# Patient Record
Sex: FEMALE | Race: WHITE | NOT HISPANIC OR LATINO | URBAN - METROPOLITAN AREA
[De-identification: names, ages, dates, MRNs, and addresses within clinical notes are randomized per-mention and may not be internally consistent; named-entity substitution may affect disease eponyms.]

---

## 2017-05-15 ENCOUNTER — OUTPATIENT (OUTPATIENT)
Dept: OUTPATIENT SERVICES | Facility: HOSPITAL | Age: 77
LOS: 1 days | Discharge: HOME | End: 2017-05-15

## 2017-06-28 DIAGNOSIS — C50.411 MALIGNANT NEOPLASM OF UPPER-OUTER QUADRANT OF RIGHT FEMALE BREAST: ICD-10-CM

## 2017-06-28 DIAGNOSIS — E83.52 HYPERCALCEMIA: ICD-10-CM

## 2017-08-08 ENCOUNTER — OUTPATIENT (OUTPATIENT)
Dept: OUTPATIENT SERVICES | Facility: HOSPITAL | Age: 77
LOS: 1 days | Discharge: HOME | End: 2017-08-08

## 2017-08-08 DIAGNOSIS — C50.411 MALIGNANT NEOPLASM OF UPPER-OUTER QUADRANT OF RIGHT FEMALE BREAST: ICD-10-CM

## 2017-11-25 ENCOUNTER — OUTPATIENT (OUTPATIENT)
Dept: OUTPATIENT SERVICES | Facility: HOSPITAL | Age: 77
LOS: 1 days | Discharge: HOME | End: 2017-11-25

## 2017-11-25 DIAGNOSIS — E83.52 HYPERCALCEMIA: ICD-10-CM

## 2017-11-25 DIAGNOSIS — C50.411 MALIGNANT NEOPLASM OF UPPER-OUTER QUADRANT OF RIGHT FEMALE BREAST: ICD-10-CM

## 2017-11-25 PROBLEM — Z00.00 ENCOUNTER FOR PREVENTIVE HEALTH EXAMINATION: Status: ACTIVE | Noted: 2017-11-25

## 2018-01-08 ENCOUNTER — APPOINTMENT (OUTPATIENT)
Dept: SURGERY | Facility: CLINIC | Age: 78
End: 2018-01-08
Payer: MEDICARE

## 2018-01-08 VITALS
BODY MASS INDEX: 29.32 KG/M2 | DIASTOLIC BLOOD PRESSURE: 72 MMHG | HEIGHT: 65 IN | SYSTOLIC BLOOD PRESSURE: 124 MMHG | WEIGHT: 176 LBS

## 2018-01-08 PROCEDURE — 99212 OFFICE O/P EST SF 10 MIN: CPT

## 2018-02-17 ENCOUNTER — OUTPATIENT (OUTPATIENT)
Dept: OUTPATIENT SERVICES | Facility: HOSPITAL | Age: 78
LOS: 1 days | Discharge: HOME | End: 2018-02-17

## 2018-02-17 DIAGNOSIS — C50.411 MALIGNANT NEOPLASM OF UPPER-OUTER QUADRANT OF RIGHT FEMALE BREAST: ICD-10-CM

## 2018-05-07 ENCOUNTER — APPOINTMENT (OUTPATIENT)
Dept: SURGERY | Facility: CLINIC | Age: 78
End: 2018-05-07
Payer: MEDICARE

## 2018-05-07 VITALS
SYSTOLIC BLOOD PRESSURE: 146 MMHG | HEIGHT: 65 IN | DIASTOLIC BLOOD PRESSURE: 88 MMHG | BODY MASS INDEX: 29.16 KG/M2 | WEIGHT: 175 LBS

## 2018-05-07 PROCEDURE — 99214 OFFICE O/P EST MOD 30 MIN: CPT

## 2018-05-07 RX ORDER — ANASTROZOLE TABLETS 1 MG/1
1 TABLET ORAL
Qty: 90 | Refills: 0 | Status: ACTIVE | COMMUNITY
Start: 2018-03-01

## 2018-05-07 RX ORDER — AMLODIPINE BESYLATE 5 MG/1
5 TABLET ORAL
Qty: 90 | Refills: 0 | Status: ACTIVE | COMMUNITY
Start: 2017-11-17

## 2018-06-09 ENCOUNTER — OUTPATIENT (OUTPATIENT)
Dept: OUTPATIENT SERVICES | Facility: HOSPITAL | Age: 78
LOS: 1 days | Discharge: HOME | End: 2018-06-09

## 2018-06-09 DIAGNOSIS — C50.411 MALIGNANT NEOPLASM OF UPPER-OUTER QUADRANT OF RIGHT FEMALE BREAST: ICD-10-CM

## 2018-09-08 ENCOUNTER — OUTPATIENT (OUTPATIENT)
Dept: OUTPATIENT SERVICES | Facility: HOSPITAL | Age: 78
LOS: 1 days | Discharge: HOME | End: 2018-09-08

## 2018-09-08 DIAGNOSIS — C50.411 MALIGNANT NEOPLASM OF UPPER-OUTER QUADRANT OF RIGHT FEMALE BREAST: ICD-10-CM

## 2018-10-04 ENCOUNTER — APPOINTMENT (OUTPATIENT)
Dept: SURGERY | Facility: CLINIC | Age: 78
End: 2018-10-04
Payer: MEDICARE

## 2018-10-04 VITALS
HEIGHT: 65 IN | BODY MASS INDEX: 29.66 KG/M2 | WEIGHT: 178 LBS | DIASTOLIC BLOOD PRESSURE: 80 MMHG | SYSTOLIC BLOOD PRESSURE: 146 MMHG

## 2018-10-04 DIAGNOSIS — K43.2 INCISIONAL HERNIA W/OUT OBSTRUCTION OR GANGRENE: ICD-10-CM

## 2018-10-04 DIAGNOSIS — C50.911 MALIGNANT NEOPLASM OF UNSPECIFIED SITE OF RIGHT FEMALE BREAST: ICD-10-CM

## 2018-10-04 PROCEDURE — 99212 OFFICE O/P EST SF 10 MIN: CPT

## 2018-12-22 ENCOUNTER — INPATIENT (INPATIENT)
Facility: HOSPITAL | Age: 78
LOS: 4 days | Discharge: NURSING, REHAB OR LONG-TERM CARE | End: 2018-12-27
Attending: HOSPITALIST | Admitting: HOSPITALIST

## 2018-12-22 VITALS
HEART RATE: 99 BPM | TEMPERATURE: 97 F | RESPIRATION RATE: 18 BRPM | OXYGEN SATURATION: 98 % | DIASTOLIC BLOOD PRESSURE: 93 MMHG | SYSTOLIC BLOOD PRESSURE: 184 MMHG

## 2018-12-22 DIAGNOSIS — Z96.659 PRESENCE OF UNSPECIFIED ARTIFICIAL KNEE JOINT: Chronic | ICD-10-CM

## 2018-12-22 DIAGNOSIS — Z96.653 PRESENCE OF ARTIFICIAL KNEE JOINT, BILATERAL: Chronic | ICD-10-CM

## 2018-12-22 DIAGNOSIS — Z90.11 ACQUIRED ABSENCE OF RIGHT BREAST AND NIPPLE: Chronic | ICD-10-CM

## 2018-12-22 LAB
ALBUMIN SERPL ELPH-MCNC: 4 G/DL — SIGNIFICANT CHANGE UP (ref 3.5–5.2)
ALP SERPL-CCNC: 112 U/L — SIGNIFICANT CHANGE UP (ref 30–115)
ALT FLD-CCNC: 16 U/L — SIGNIFICANT CHANGE UP (ref 0–41)
ANION GAP SERPL CALC-SCNC: 19 MMOL/L — HIGH (ref 7–14)
APTT BLD: 24.9 SEC — LOW (ref 27–39.2)
AST SERPL-CCNC: 36 U/L — SIGNIFICANT CHANGE UP (ref 0–41)
BASOPHILS # BLD AUTO: 0.05 K/UL — SIGNIFICANT CHANGE UP (ref 0–0.2)
BASOPHILS NFR BLD AUTO: 0.4 % — SIGNIFICANT CHANGE UP (ref 0–1)
BILIRUB SERPL-MCNC: 0.5 MG/DL — SIGNIFICANT CHANGE UP (ref 0.2–1.2)
BUN SERPL-MCNC: 27 MG/DL — HIGH (ref 10–20)
CALCIUM SERPL-MCNC: 10 MG/DL — SIGNIFICANT CHANGE UP (ref 8.5–10.1)
CHLORIDE SERPL-SCNC: 103 MMOL/L — SIGNIFICANT CHANGE UP (ref 98–110)
CO2 SERPL-SCNC: 21 MMOL/L — SIGNIFICANT CHANGE UP (ref 17–32)
CREAT SERPL-MCNC: 1.2 MG/DL — SIGNIFICANT CHANGE UP (ref 0.7–1.5)
EOSINOPHIL # BLD AUTO: 0.12 K/UL — SIGNIFICANT CHANGE UP (ref 0–0.7)
EOSINOPHIL NFR BLD AUTO: 1.1 % — SIGNIFICANT CHANGE UP (ref 0–8)
GLUCOSE SERPL-MCNC: 113 MG/DL — HIGH (ref 70–99)
HCT VFR BLD CALC: 33.7 % — LOW (ref 37–47)
HGB BLD-MCNC: 11 G/DL — LOW (ref 12–16)
IMM GRANULOCYTES NFR BLD AUTO: 0.4 % — HIGH (ref 0.1–0.3)
INR BLD: 1 RATIO — SIGNIFICANT CHANGE UP (ref 0.65–1.3)
LYMPHOCYTES # BLD AUTO: 0.81 K/UL — LOW (ref 1.2–3.4)
LYMPHOCYTES # BLD AUTO: 7.3 % — LOW (ref 20.5–51.1)
MCHC RBC-ENTMCNC: 30.5 PG — SIGNIFICANT CHANGE UP (ref 27–31)
MCHC RBC-ENTMCNC: 32.6 G/DL — SIGNIFICANT CHANGE UP (ref 32–37)
MCV RBC AUTO: 93.4 FL — SIGNIFICANT CHANGE UP (ref 81–99)
MONOCYTES # BLD AUTO: 0.77 K/UL — HIGH (ref 0.1–0.6)
MONOCYTES NFR BLD AUTO: 6.9 % — SIGNIFICANT CHANGE UP (ref 1.7–9.3)
NEUTROPHILS # BLD AUTO: 9.36 K/UL — HIGH (ref 1.4–6.5)
NEUTROPHILS NFR BLD AUTO: 83.9 % — HIGH (ref 42.2–75.2)
NRBC # BLD: 0 /100 WBCS — SIGNIFICANT CHANGE UP (ref 0–0)
PLATELET # BLD AUTO: 205 K/UL — SIGNIFICANT CHANGE UP (ref 130–400)
POTASSIUM SERPL-MCNC: 4.5 MMOL/L — SIGNIFICANT CHANGE UP (ref 3.5–5)
POTASSIUM SERPL-SCNC: 4.5 MMOL/L — SIGNIFICANT CHANGE UP (ref 3.5–5)
PROT SERPL-MCNC: 6.9 G/DL — SIGNIFICANT CHANGE UP (ref 6–8)
PROTHROM AB SERPL-ACNC: 11.5 SEC — SIGNIFICANT CHANGE UP (ref 9.95–12.87)
RBC # BLD: 3.61 M/UL — LOW (ref 4.2–5.4)
RBC # FLD: 13 % — SIGNIFICANT CHANGE UP (ref 11.5–14.5)
SODIUM SERPL-SCNC: 143 MMOL/L — SIGNIFICANT CHANGE UP (ref 135–146)
TROPONIN T SERPL-MCNC: 0.01 NG/ML — SIGNIFICANT CHANGE UP
WBC # BLD: 11.16 K/UL — HIGH (ref 4.8–10.8)
WBC # FLD AUTO: 11.16 K/UL — HIGH (ref 4.8–10.8)

## 2018-12-22 RX ORDER — ACETAMINOPHEN 500 MG
650 TABLET ORAL ONCE
Qty: 0 | Refills: 0 | Status: COMPLETED | OUTPATIENT
Start: 2018-12-22 | End: 2018-12-22

## 2018-12-22 RX ORDER — AMLODIPINE BESYLATE 2.5 MG/1
0 TABLET ORAL
Qty: 0 | Refills: 0 | COMMUNITY

## 2018-12-22 RX ORDER — ANASTROZOLE 1 MG/1
0 TABLET ORAL
Qty: 90 | Refills: 0 | COMMUNITY

## 2018-12-22 RX ORDER — IBUPROFEN 200 MG
600 TABLET ORAL ONCE
Qty: 0 | Refills: 0 | Status: COMPLETED | OUTPATIENT
Start: 2018-12-22 | End: 2018-12-22

## 2018-12-22 RX ORDER — ANASTROZOLE 1 MG/1
1 TABLET ORAL DAILY
Qty: 0 | Refills: 0 | Status: DISCONTINUED | OUTPATIENT
Start: 2018-12-22 | End: 2018-12-27

## 2018-12-22 RX ORDER — ENOXAPARIN SODIUM 100 MG/ML
40 INJECTION SUBCUTANEOUS DAILY
Qty: 0 | Refills: 0 | Status: DISCONTINUED | OUTPATIENT
Start: 2018-12-22 | End: 2018-12-27

## 2018-12-22 RX ORDER — AMLODIPINE BESYLATE 2.5 MG/1
5 TABLET ORAL DAILY
Qty: 0 | Refills: 0 | Status: DISCONTINUED | OUTPATIENT
Start: 2018-12-22 | End: 2018-12-27

## 2018-12-22 RX ADMIN — Medication 650 MILLIGRAM(S): at 18:46

## 2018-12-22 RX ADMIN — Medication 600 MILLIGRAM(S): at 18:46

## 2018-12-22 NOTE — H&P ADULT - ATTENDING COMMENTS
This is 78 with PMH of HTN, Breast Ca s/p right mastectomy and B/l TKR 25 years ago was brought to ED by EMS for weakness and unable to ambulate, as per patient she was feeling weak yesterday, her daughter tried to get her up from her chair but she couldn't.   Also she was lethargic and c/o worsening left knee pain for the last few weeks, she feels click sensation with knee movement, she is scheduled to see her orthopedist next month. She denies headache, cough, chest pain, SOB, abdominal pain, diarrhea or urinary symptoms.     PHYSICAL EXAM:  GENERAL: NAD, well-developed  HEAD:  Atraumatic, Normocephalic  EYES: EOMI, PERRLA, conjunctiva and sclera clear  NECK: Supple, No JVD  CHEST/LUNG: Clear to auscultation bilaterally; No wheeze  HEART: Regular rate and rhythm; No murmurs, rubs, or gallops  ABDOMEN: Soft, Nontender, Nondistended; Bowel sounds present  EXTREMITIES:  2+ Peripheral Pulses, mild left knee tenderness and warmness.   PSYCH: AAOx3  NEUROLOGY: non-focal    A/P:  1. Progressive weakness, possible from worsening knee pain    Left knee Pain:   Exam showed mild tenderness, Knee X ray showed s/p knee replacement, no acute Fx.   Orthopedic consult, check ESR, CRP.   Pain control  Rehab and PT evaluation.     HTN:   Continue Norvasc.     Breast CA: S/P  mastectomy.   Continue Anastrozole;      DVT PPX: Lovenox SC.         SKIN: No rashes or lesions

## 2018-12-22 NOTE — ED PROVIDER NOTE - NS ED ROS FT
Constitutional: no fever, chills, no recent weight loss, change in appetite or malaise  Cardiac: No chest pain, SOB or edema.  Respiratory: No cough or respiratory distress  GI: No nausea, vomiting, diarrhea or abdominal pain.  : No dysuria, frequency, urgency or hematuria  MS: + left knee pain, low back pain  Neuro: No headache or weakness. No LOC.  Skin: No skin rash, redness  Except as documented in the HPI, all other systems are negative.

## 2018-12-22 NOTE — ED PROVIDER NOTE - ATTENDING CONTRIBUTION TO CARE
patient is BIB daughter for worsening Lt knee pain x few weeks, had been to orthopedic doctor, had x-rays done, was recommended knee surgery. Patient states that knee pain has been worse over the last weeks and more over the last two days, has been spending most of the time on the bed, today while getting out of the bed, almost fell, did not fall, at that time knee pain got worse, so was brought to ED. Patient also has chronic back pain, which has been worse. Denies any trauma, denies any cp/sob/n/v/f/c/abd pain.   vitals noted  lungs: CTA  abd: +BS, +NT, ND, soft  A/P: worsening back/knee pain  labs, x-rays, reevaluation

## 2018-12-22 NOTE — H&P ADULT - HISTORY OF PRESENT ILLNESS
78y F w pmh of double knee replacement 25 years ago, htn,  breast ca s/p surgery/on hormone tx, recently discovered worn out L knee replacement that needs re-replacement by o/p ortho Dr. Leavitt (appt in 1/'19) pw cc worsening L knee pain, affecting ability to ambulate over past few days.  Pt also c/o new back pain 2/2 bumpy ambulance ride.     L KNEE PAINS  -  had plans for o/p f/u Dr. Leavitt 1/'19 (needs L knee replacement 2/2 worn out band)  -  imaging: f/u official XR knee read  -  pain control:    -  ortho eval (pt may not be able to wait until 1/'19 for intervention 2/2 pains)  -  pt rehab    BACK PAIN / SMALL SOFT TISSUE HEMATOMA  -  after bump ambulance ride  -  symptomatic pain control    HTN (hypertension) - cw amlodpine  Breast CA: S/P surgery - cw anastrozole;  L arm precautions 2/2 lymphedema  CHG 4% bath daily and prn  DVT PPX 78y F w pmh of double knee replacement 25 years ago, pw cc worsening L knee pain, affecting ability to ambulate.     Pt is lethargic and sleepy, seemingly poor historian at the moment.  Daughter was called for further history taking.  Per daughter,  history goes back to a while ago, when pt started to have pains on her L leg, and felt clicking on movement of that joint.  On w/u,  discovered to have worn out 'band inbetween the (previous) replacement', which needed to be replaced.  Pt had ortho appointment 1/'19 with Dr. Leavitt.     However over past week, and most prominently over past 1-2 days,  pt had significant ambulation problems.  On day of presentation,  pt was unable to get out of the chair and almost slipped (did not fall),  which prompted the ed visit.      On ambulance ride to hospital,  pt also suffered new back pain from the bumpy ride.

## 2018-12-22 NOTE — ED PROVIDER NOTE - MEDICAL DECISION MAKING DETAILS
patient improved well, discussed with orthopedic resident, he reviewed x-rays, recommended no acute surgical intervention. Patient is admitted for pain control, PT/rehab and for failure of ambulation.

## 2018-12-22 NOTE — ED PROVIDER NOTE - PHYSICAL EXAMINATION
CONSTITUTIONAL: Well-appearing; well-nourished; in no apparent distress.   NECK: Supple; non-tender; no cervical lymphadenopathy. No JVD.   CARDIOVASCULAR: Normal S1, S2; no murmurs, rubs, or gallops.   RESPIRATORY: Normal chest excursion with respiration; breath sounds clear and equal bilaterally; no wheezes, rhonchi, or rales.  GI/: Normal bowel sounds; non-distended; non-tender; no palpable organomegaly.   MS: No evidence of trauma or deformity. No joint effusion/swelling. + ttp over medial patella. + full passive rom of b/l knee. Stable pelvis. + mild ttp over mid sacral lumbar region.   SKIN: No erythema, warmth, ecchymosis  NEURO/PSYCH: A & O x 4; grossly unremarkable. mood and manner are appropriate. Grooming and personal hygiene are appropriate.

## 2018-12-22 NOTE — H&P ADULT - NSHPPHYSICALEXAM_GEN_ALL_CORE
Constitutional: LETHARGIC BUT AROUSEABLE;  ANSWERING QUESTIONS, BUT FALLING BACK ASLEEP.   Respiratory:  clear lung sounds b/l;   Cardiovascular:  s1, s2,  regular, no murmurs  Gastrointestinal:  nontender, nondistended, +bowel sounds  Extremities: RLE EDEMA > LLE;  L KNEE WARMER THAN R TO PALPATION,  NO DISTINCT AREAS OF EFFUSION FELT, BUT KNEE TTP AND ROM LIMITED 2/2 PAIN.   Neurological: AAO X 3    ------------------------------------------------------------------     VITAL SIGNS   T(F): 97.2 (12-22 @ 16:37), Max: 97.2 (12-22 @ 16:37)  HR: 99 (12-22 @ 16:37)  BP: 184/93 (12-22 @ 16:37)  RR: 18 (12-22 @ 16:37)  SpO2: 98% (12-22 @ 16:37)

## 2018-12-22 NOTE — H&P ADULT - ASSESSMENT
78y F w pmh of double knee replacement 25 years ago, pw cc worsening L knee pain, affecting ability to ambulate.     Pt is lethargic and sleepy, seemingly poor historian at the moment.  Daughter was called for further history taking.  Per daughter,  history goes back to a while ago, when pt started to have pains on her L leg, and felt clicking on movement of that joint.  On w/u,  discovered to have worn out 'band inbetween the (previous) replacement', which needed to be replaced.  Pt had ortho appointment 1/'19 with Dr. Leavitt.     However over past week, and most prominently over past 1-2 days,  pt had significant ambulation problems.  On day of presentation,  pt was unable to get out of the chair and almost slipped (did not fall),  which prompted the ed visit.      On ambulance ride to hospital,  pt also suffered new back pain from the bumpy ride. 78y F w pmh of double knee replacement 25 years ago, htn,  breast ca s/p surgery/on hormone tx, recently discovered worn out L knee replacement that needs re-replacement by o/p ortho Dr. Leavitt (appt in 1/'19) pw cc worsening L knee pain, affecting ability to ambulate over past few days.  Pt also c/o new back pain 2/2 bumpy ambulance ride.     L KNEE PAINS  -  had plans for o/p f/u Dr. Leavitt 1/'19 (needs L knee replacement 2/2 worn out band)  -  imaging: f/u official XR knee read  -  pain control:    -  ortho eval (pt may not be able to wait until 1/'19 for intervention 2/2 pains)  -  pt rehab    BACK PAIN / SMALL SOFT TISSUE HEMATOMA  -  after bump ambulance ride  -  symptomatic pain control    HTN (hypertension) - cw amlodpine  Breast CA: S/P surgery - cw anastrozole;  L arm precautions 2/2 lymphedema  CHG 4% bath daily and prn  DVT PPX

## 2018-12-22 NOTE — ED PROVIDER NOTE - OBJECTIVE STATEMENT
78 year old female with hx of HTN, breast ca s/p mastectomy 3 years ago in remission, b/l knee replacement 25 years ago c/o left knee pain x 5 weeks. Pt saw orthopedist at [facility] 2 weeks ago Dr. Leavitt, had xrays done and was told she needs another knee replacement. Sts the pain has been worsening x 1 week and was having difficulty ambulating today due to the pain. She took 300mg ibuprofen at 6am without relief. Pt also c/o low back pain, sts it started in the ambulance when they hit a pothole. Denies any other injuries/trauma, joint swelling/redness, fever/chills, abdominal pain, n/v, urinary incontinence, saddle anesthesia.

## 2018-12-23 PROBLEM — C50.919 MALIGNANT NEOPLASM OF UNSPECIFIED SITE OF UNSPECIFIED FEMALE BREAST: Chronic | Status: ACTIVE | Noted: 2018-12-22

## 2018-12-23 LAB
ALBUMIN SERPL ELPH-MCNC: 3.4 G/DL — LOW (ref 3.5–5.2)
ALP SERPL-CCNC: 90 U/L — SIGNIFICANT CHANGE UP (ref 30–115)
ALT FLD-CCNC: 13 U/L — SIGNIFICANT CHANGE UP (ref 0–41)
ANION GAP SERPL CALC-SCNC: 15 MMOL/L — HIGH (ref 7–14)
AST SERPL-CCNC: 22 U/L — SIGNIFICANT CHANGE UP (ref 0–41)
BASOPHILS # BLD AUTO: 0.03 K/UL — SIGNIFICANT CHANGE UP (ref 0–0.2)
BASOPHILS NFR BLD AUTO: 0.4 % — SIGNIFICANT CHANGE UP (ref 0–1)
BILIRUB SERPL-MCNC: 0.6 MG/DL — SIGNIFICANT CHANGE UP (ref 0.2–1.2)
BUN SERPL-MCNC: 22 MG/DL — HIGH (ref 10–20)
CALCIUM SERPL-MCNC: 9.1 MG/DL — SIGNIFICANT CHANGE UP (ref 8.5–10.1)
CHLORIDE SERPL-SCNC: 104 MMOL/L — SIGNIFICANT CHANGE UP (ref 98–110)
CO2 SERPL-SCNC: 23 MMOL/L — SIGNIFICANT CHANGE UP (ref 17–32)
CREAT SERPL-MCNC: 1 MG/DL — SIGNIFICANT CHANGE UP (ref 0.7–1.5)
EOSINOPHIL # BLD AUTO: 0.18 K/UL — SIGNIFICANT CHANGE UP (ref 0–0.7)
EOSINOPHIL NFR BLD AUTO: 2.6 % — SIGNIFICANT CHANGE UP (ref 0–8)
ERYTHROCYTE [SEDIMENTATION RATE] IN BLOOD: 45 MM/HR — HIGH (ref 0–20)
GLUCOSE SERPL-MCNC: 136 MG/DL — HIGH (ref 70–99)
HCT VFR BLD CALC: 31.7 % — LOW (ref 37–47)
HGB BLD-MCNC: 10.1 G/DL — LOW (ref 12–16)
IMM GRANULOCYTES NFR BLD AUTO: 0.4 % — HIGH (ref 0.1–0.3)
INR BLD: 1.03 RATIO — SIGNIFICANT CHANGE UP (ref 0.65–1.3)
LACTATE SERPL-SCNC: 0.9 MMOL/L — SIGNIFICANT CHANGE UP (ref 0.5–2.2)
LYMPHOCYTES # BLD AUTO: 0.94 K/UL — LOW (ref 1.2–3.4)
LYMPHOCYTES # BLD AUTO: 13.7 % — LOW (ref 20.5–51.1)
MAGNESIUM SERPL-MCNC: 2 MG/DL — SIGNIFICANT CHANGE UP (ref 1.8–2.4)
MCHC RBC-ENTMCNC: 29.9 PG — SIGNIFICANT CHANGE UP (ref 27–31)
MCHC RBC-ENTMCNC: 31.9 G/DL — LOW (ref 32–37)
MCV RBC AUTO: 93.8 FL — SIGNIFICANT CHANGE UP (ref 81–99)
MONOCYTES # BLD AUTO: 0.48 K/UL — SIGNIFICANT CHANGE UP (ref 0.1–0.6)
MONOCYTES NFR BLD AUTO: 7 % — SIGNIFICANT CHANGE UP (ref 1.7–9.3)
NEUTROPHILS # BLD AUTO: 5.18 K/UL — SIGNIFICANT CHANGE UP (ref 1.4–6.5)
NEUTROPHILS NFR BLD AUTO: 75.9 % — HIGH (ref 42.2–75.2)
PLATELET # BLD AUTO: 169 K/UL — SIGNIFICANT CHANGE UP (ref 130–400)
POTASSIUM SERPL-MCNC: 3.3 MMOL/L — LOW (ref 3.5–5)
POTASSIUM SERPL-SCNC: 3.3 MMOL/L — LOW (ref 3.5–5)
PROT SERPL-MCNC: 5.5 G/DL — LOW (ref 6–8)
PROTHROM AB SERPL-ACNC: 11.8 SEC — SIGNIFICANT CHANGE UP (ref 9.95–12.87)
RBC # BLD: 3.38 M/UL — LOW (ref 4.2–5.4)
RBC # FLD: 13.1 % — SIGNIFICANT CHANGE UP (ref 11.5–14.5)
SODIUM SERPL-SCNC: 142 MMOL/L — SIGNIFICANT CHANGE UP (ref 135–146)
WBC # BLD: 6.84 K/UL — SIGNIFICANT CHANGE UP (ref 4.8–10.8)
WBC # FLD AUTO: 6.84 K/UL — SIGNIFICANT CHANGE UP (ref 4.8–10.8)

## 2018-12-23 RX ORDER — KETOROLAC TROMETHAMINE 30 MG/ML
15 SYRINGE (ML) INJECTION EVERY 8 HOURS
Qty: 0 | Refills: 0 | Status: DISCONTINUED | OUTPATIENT
Start: 2018-12-23 | End: 2018-12-27

## 2018-12-23 RX ORDER — CHLORHEXIDINE GLUCONATE 213 G/1000ML
1 SOLUTION TOPICAL
Qty: 0 | Refills: 0 | Status: DISCONTINUED | OUTPATIENT
Start: 2018-12-23 | End: 2018-12-27

## 2018-12-23 RX ADMIN — ANASTROZOLE 1 MILLIGRAM(S): 1 TABLET ORAL at 11:22

## 2018-12-23 RX ADMIN — ENOXAPARIN SODIUM 40 MILLIGRAM(S): 100 INJECTION SUBCUTANEOUS at 11:22

## 2018-12-23 RX ADMIN — CHLORHEXIDINE GLUCONATE 1 APPLICATION(S): 213 SOLUTION TOPICAL at 06:40

## 2018-12-23 RX ADMIN — AMLODIPINE BESYLATE 5 MILLIGRAM(S): 2.5 TABLET ORAL at 06:39

## 2018-12-23 NOTE — CONSULT NOTE ADULT - ATTENDING COMMENTS
Pt seen and examined.  agree with resident exam and plan.  Pt with known need for revision LTKA  WBAT (walker for assistance)  PT, OOB, pain control,   F/U as outpatient with Dr. Hernández at [address] [facility] [phone]   ortho to sign off.

## 2018-12-23 NOTE — PATIENT PROFILE ADULT - VISION (WITH CORRECTIVE LENSES IF THE PATIENT USUALLY WEARS THEM):
WEARS GLASSES/Normal vision: sees adequately in most situations; can see medication labels, newsprint

## 2018-12-23 NOTE — CONSULT NOTE ADULT - SUBJECTIVE AND OBJECTIVE BOX
Patient stephanie historian, daughter called at [phone], unable to reach    78F presents with b/l knee pain over the past couple of months, L>R, with increasing left knee pain over the past few weeks limiting ability to ambulate. Yesterday, [place] "slipped" from chair, although denies fall, any trauma. Patient had bilateral knee replacements at [facility] >25 years prior, cannot recall surgeon. Patient denies any complication with knee replacement since (i.e infections, revisions, falls). Patient recently made appointment to be evaluated by Dr. Leavitt on SI but has yet to be evaluated. y F w pmh of double knee replacement 25 years ago, pw cc worsening L knee pain, affecting ability to ambulate.     PAST MEDICAL & SURGICAL HISTORY:  HTN (hypertension)  Breast CA: S/P surgery and on hormone therapy  Status post knee replacement: B/L  H/O mastectomy, right    Home Medications:  AMLODIPINE BESYLATE 5 MG TABS:  (22 Dec 2018 23:13)  anastrozole 1 mg oral tablet: 1 tab(s) orally once a day (22 Dec 2018 23:13)    Allergies    No Known Allergies    Intolerances      Social: Denies Toxic habits    Physical Exam  NAD, AOx3  Non-labored breathing    Right Knee  Well healed skin incision  No effusion appreciated   Non-erythematous, no warmth appreciated   ROM 0-90  Stable to varus/valgus  EHL/FHL/TA/GA Motor intact  SP/DP/T/S/S SILT   Toes WWP    Left Knee  Well healed skin incision  Mild effusion appreciated   Non-erythematous, no warmth appreciated   ROM 0-80  Stable to varus/valgus  EHL/FHL/TA/GA Motor intact  SP/DP/T/S/S SILT   Toes WW    Labs/Imaging                        10.1   6.84  )-----------( 169      ( 23 Dec 2018 09:48 )             31.7     12-23    142  |  104  |  22<H>  ----------------------------<  136<H>  3.3<L>   |  23  |  1.0    Ca    9.1      23 Dec 2018 09:48  Mg     2.0     12-23    TPro  5.5<L>  /  Alb  3.4<L>  /  TBili  0.6  /  DBili  x   /  AST  22  /  ALT  13  /  AlkPhos  90  12-23    78F s/p B/l TKA >25 years prior presenting with L knee pain, afebrile, non-erythematous, WBC of 6.84, low concern for infectious etiology. Patient may require revision TKA. Patient unsure if she would like to follow up with Dr. Leavitt at scheduled follow up or continue care here, will discuss with daughter     Please obtain R knee XRs  Send ESR, CRP  WBAT  PT  Pain control  Orthopedics to follow

## 2018-12-24 LAB — CRP SERPL-MCNC: 0.93 MG/DL — HIGH (ref 0–0.4)

## 2018-12-24 RX ADMIN — AMLODIPINE BESYLATE 5 MILLIGRAM(S): 2.5 TABLET ORAL at 05:00

## 2018-12-24 RX ADMIN — ENOXAPARIN SODIUM 40 MILLIGRAM(S): 100 INJECTION SUBCUTANEOUS at 11:19

## 2018-12-24 RX ADMIN — ANASTROZOLE 1 MILLIGRAM(S): 1 TABLET ORAL at 11:19

## 2018-12-24 NOTE — PHYSICAL THERAPY INITIAL EVALUATION ADULT - GAIT DEVIATIONS NOTED, PT EVAL
decreased krishna/decreased weight-shifting ability/decreased velocity of limb motion/decreased step length

## 2018-12-24 NOTE — PHYSICAL THERAPY INITIAL EVALUATION ADULT - RANGE OF MOTION EXAMINATION, REHAB EVAL
right elbow limited extension to 1/3 active range due to varus deformity; left knee limited to 10 degrees extension

## 2018-12-24 NOTE — PROGRESS NOTE ADULT - SUBJECTIVE AND OBJECTIVE BOX
BHAVIN CHATMAN MRN-[id number]    Subjective:  Patient was seen and examined at bedside. Reports improvement in  presenting complaint.   No significant overnight events reported.          Vital Signs Last 24 Hrs  T(C): 36.9 (24 Dec 2018 07:41), Max: 36.9 (24 Dec 2018 07:41)  T(F): 98.4 (24 Dec 2018 07:41), Max: 98.4 (24 Dec 2018 07:41)  HR: 78 (24 Dec 2018 07:41) (71 - 87)  BP: 114/55 (24 Dec 2018 07:41) (103/70 - 118/69)  BP(mean): --  RR: 198 (24 Dec 2018 07:41) (18 - 198)  SpO2: 95% (24 Dec 2018 05:09) (95% - 95%)    Physical Examination:  Not in acute distress, Oriented X 3  General: NAD, no pallor, or icterus, afebrile  HEENT:  WOLF, EOMI, no JVD, no Bruit.  Heart: S1+S2 audible, no murmur  Lungs: bilateral  fair air entry, no wheezing, no crepitations.  Abdomen: Soft, non-tender, non-distended , no  rigidity or guarding.  CNS: AAOx3, CN  grossly intact, sensation intact.  Extremities:  No edema          ROS:   Patient denies any headache, any vision complaints, runny nose, fever, chills, sore throat. Denies chest pain, shortness of breath, palpitation. Denies nausea, vomiting, abdominal pain, diarrhoea, Denies urinary burning, urgency, frequency, dysuria. Denies weakness in any part of the body or numbness.   At least 10 systems were reviewed in ROS. All systems reviewed  are within normal limits except for the complaints as described in Subjective.    MEDICATIONS  (STANDING):  amLODIPine   Tablet 5 milliGRAM(s) Oral daily  anastrozole 1 milliGRAM(s) Oral daily  chlorhexidine 4% Liquid 1 Application(s) Topical <User Schedule>  enoxaparin Injectable 40 milliGRAM(s) SubCutaneous daily    MEDICATIONS  (PRN):  ketorolac   Injectable 15 milliGRAM(s) IV Push every 8 hours PRN Severe Pain (7 - 10)                            10.1   6.84  )-----------( 169      ( 23 Dec 2018 09:48 )             31.7     12-23    142  |  104  |  22<H>  ----------------------------<  136<H>  3.3<L>   |  23  |  1.0    Ca    9.1      23 Dec 2018 09:48  Mg     2.0     12-23    TPro  5.5<L>  /  Alb  3.4<L>  /  TBili  0.6  /  DBili  x   /  AST  22  /  ALT  13  /  AlkPhos  90  12-23 BHAVIN CHATMAN [id number]    Subjective:  Patient was seen and examined at bedside. Reports improvement in  presenting complaint.   No significant overnight events reported.          Vital Signs Last 24 Hrs  T(C): 36.9 (24 Dec 2018 07:41), Max: 36.9 (24 Dec 2018 07:41)  T(F): 98.4 (24 Dec 2018 07:41), Max: 98.4 (24 Dec 2018 07:41)  HR: 78 (24 Dec 2018 07:41) (71 - 87)  BP: 114/55 (24 Dec 2018 07:41) (103/70 - 118/69)  BP(mean): --  RR: 198 (24 Dec 2018 07:41) (18 - 198)  SpO2: 95% (24 Dec 2018 05:09) (95% - 95%)    Physical Examination:  Not in acute distress, Oriented X 3  General: NAD, no pallor, or icterus, afebrile  HEENT:  WOLF, EOMI, no JVD, no Bruit.  Heart: S1+S2 audible, no murmur  Lungs: bilateral  fair air entry, no wheezing, no crepitations.  Abdomen: Soft, non-tender, non-distended , no  rigidity or guarding.  CNS: AAOx3, CN  grossly intact, sensation intact.  Extremities:   mild left knee tenderness       ROS:   Patient denies any headache, any vision complaints, runny nose, fever, chills, sore throat. Denies chest pain, shortness of breath, palpitation. Denies nausea, vomiting, abdominal pain, diarrhoea, Denies urinary burning, urgency, frequency, dysuria. Denies weakness in any part of the body or numbness.   At least 10 systems were reviewed in ROS. All systems reviewed  are within normal limits except for the complaints as described in Subjective.    MEDICATIONS  (STANDING):  amLODIPine   Tablet 5 milliGRAM(s) Oral daily  anastrozole 1 milliGRAM(s) Oral daily  chlorhexidine 4% Liquid 1 Application(s) Topical <User Schedule>  enoxaparin Injectable 40 milliGRAM(s) SubCutaneous daily    MEDICATIONS  (PRN):  ketorolac   Injectable 15 milliGRAM(s) IV Push every 8 hours PRN Severe Pain (7 - 10)                            10.1   6.84  )-----------( 169      ( 23 Dec 2018 09:48 )             31.7     12-23    142  |  104  |  22<H>  ----------------------------<  136<H>  3.3<L>   |  23  |  1.0    Ca    9.1      23 Dec 2018 09:48  Mg     2.0     12-23    TPro  5.5<L>  /  Alb  3.4<L>  /  TBili  0.6  /  DBili  x   /  AST  22  /  ALT  13  /  AlkPhos  90  12-23

## 2018-12-24 NOTE — PROGRESS NOTE ADULT - SUBJECTIVE AND OBJECTIVE BOX
SUBJECTIVE:    Patient is a 78y old Female who presents with a chief complaint of L KNEE PAIN / INABILITY TO AMBULATE (24 Dec 2018 13:41)    Stable, no acute events.     PAST MEDICAL & SURGICAL HISTORY  HTN (hypertension)  Breast CA: S/P surgery and on hormone therapy  Status post knee replacement: B/L  H/O mastectomy, right       ALLERGIES:  No Known Allergies    MEDICATIONS:  STANDING MEDICATIONS  amLODIPine   Tablet 5 milliGRAM(s) Oral daily  anastrozole 1 milliGRAM(s) Oral daily  chlorhexidine 4% Liquid 1 Application(s) Topical <User Schedule>  enoxaparin Injectable 40 milliGRAM(s) SubCutaneous daily    PRN MEDICATIONS  ketorolac   Injectable 15 milliGRAM(s) IV Push every 8 hours PRN    VITALS:   T(F): 98.7  HR: 100  BP: 124/84  RR: 19  SpO2: 95%    LABS:                        10.1   6.84  )-----------( 169      ( 23 Dec 2018 09:48 )             31.7     12-23    142  |  104  |  22<H>  ----------------------------<  136<H>  3.3<L>   |  23  |  1.0    Ca    9.1      23 Dec 2018 09:48  Mg     2.0     12-23    TPro  5.5<L>  /  Alb  3.4<L>  /  TBili  0.6  /  DBili  x   /  AST  22  /  ALT  13  /  AlkPhos  90  12-23    PT/INR - ( 23 Dec 2018 09:48 )   PT: 11.80 sec;   INR: 1.03 ratio                           12-23-18 @ 07:01  -  12-24-18 @ 07:00  --------------------------------------------------------  IN: 470 mL / OUT: 100 mL / NET: 370 mL    12-24-18 @ 07:01  -  12-24-18 @ 19:31  --------------------------------------------------------  IN: 830 mL / OUT: 0 mL / NET: 830 mL          PHYSICAL EXAM:  GEN: NAD, comfortable  LUNGS: CTAB, no w/r/r  HEART: RRR, no m/r/g  ABD: soft, NT/ND, +BS  EXT: bilateral lower ext edema  NEURO: AAOx3

## 2018-12-24 NOTE — PROGRESS NOTE ADULT - ASSESSMENT
78F presents with b/l knee pain over the past couple of months, L>R, with increasing left knee pain over the past few weeks limiting ability to ambulate.     ASSESSMENT:  Principal Diagnosis:  right lateral tibial plateau fracture      Associated Active Comorbid Conditions:  HTN (hypertension)  Breast CA: S/P surgery and on hormone therapy  Status post knee replacement: B/L  H/O mastectomy, right      PLAN:   -ortho consult appreciated. revision LTKA as outpatient  -WBAT  -PT, OOB, pain control,   -F/U as outpatient with Dr. Hernández at [facility] [phone]   -PT and OT evaluation and treatment.   -rehab consult  Discharge Disposition: stable for discharge to rehab. /CM for placement. 78F presents with b/l knee pain over the past couple of months, L>R, with increasing left knee pain over the past few weeks limiting ability to ambulate.     ASSESSMENT:  Principal Diagnosis:  right lateral tibial plateau fracture      Associated Active Comorbid Conditions:  HTN (hypertension)  Breast CA: S/P surgery and on hormone therapy  Status post knee replacement: B/L  H/O mastectomy, right      PLAN:   -ortho consult appreciated. revision LTKA as outpatient  -WBAT  -PT, OOB, pain control,   - continue with home meds  -F/U as outpatient with Dr. Hernández at [address] [facility] [phone]   -PT and OT evaluation and treatment.   -rehab consult  Discharge Disposition: stable for discharge to rehab. /CM for placement.

## 2018-12-24 NOTE — PHYSICAL THERAPY INITIAL EVALUATION ADULT - GENERAL OBSERVATIONS, REHAB EVAL
[id number] am Chart reviewed. Pt. seen semirecline in bed, in No apparent distress, + IV lock, + varus angulation right elbow, out-toeing left foot, c/o pain on left knee, agreed to therapy

## 2018-12-24 NOTE — CONSULT NOTE ADULT - ASSESSMENT
IMPRESSION: Rehab of gait ab ? Failed TKRS    PRECAUTIONS: [    ] Cardiac  [    ] Respiratory  [    ] Seizures [    ] Contact Isolation  [    ] Droplet Isolation  [    ] Other    Weight Bearing Status:     RECOMMENDATION:    Out of Bed to Chair     DVT/Decubiti Prophylaxis    REHAB PLAN:     [ x    ] Bedside P/T 3-5 times a week   [     ] Bedside O/T  2-3 times a week   [     ] No Rehab Therapy Indicated   [     ]  Speech Therapy   Conditioning/ROM                                 ADL  Bed Mobility                                            Conditioning/ROM  Transfers                                                  Bed Mobility  Sitting /Standing Balance                      Transfers                                        Gait Training                                            Sitting/Standing Balance  Stair Training [   ]Applicable                 Home equipment Eval                                                                     Splinting  [   ] Only      GOALS:   ADL   [   x ]   Independent         Transfers  [ x   ] Independent            Ambulation  [ x   ] Independent     [   x  ] With device                            [    ]  CG                                               [    ]  CG                                                    [     ] CG                            [    ] Min A                                          [    ] Min A                                                [     ] Min  A          DISCHARGE PLAN:   [     ]  Good candidate for Intensive Rehabilitation/Hospital based                                             Will tolerate 3hrs Intensive Rehab Daily                                       [    x  ]  Short Term Rehab in Skilled Nursing Facility                                       [      ]  Home with Outpatient or  services                                         [      ]  Possible Candidate for Intensive Hospital based Rehab

## 2018-12-24 NOTE — PHYSICAL THERAPY INITIAL EVALUATION ADULT - PERTINENT HX OF CURRENT PROBLEM, REHAB EVAL
78/F admitted for left knee pain; inability to ambulate. Pt. with 25-yr B/L TKR and known by Ortho to need  left TKR revision

## 2018-12-24 NOTE — PROGRESS NOTE ADULT - ASSESSMENT
- Imaging shows no acute fracture/injury  - Patient due for revision of left TKA but will happen as outpatient  - WBAT on left knee  - PT and OT evaluation and treatment.   - Rehab consult: STR in SNF  - Patient is medically stable for discharge, awaiting placement  - HTN - continue amlodipine  - DVT ppx

## 2018-12-24 NOTE — CONSULT NOTE ADULT - SUBJECTIVE AND OBJECTIVE BOX
Patient is a 78y old  Female who presents with a chief complaint of L KNEE PAIN / INABILITY TO AMBULATE (23 Dec 2018 10:42)    HPI:  78y F w pmh of double knee replacement 25 years ago, pw cc worsening L knee pain, affecting ability to ambulate.     Pt is lethargic and sleepy, seemingly poor historian at the moment.  Daughter was called for further history taking.  Per daughter,  history goes back to a while ago, when pt started to have pains on her L leg, and felt clicking on movement of that joint.  On w/u,  discovered to have worn out 'band inbetween the (previous) replacement', which needed to be replaced.  Pt had ortho appointment 1/'19 with Dr. Leavitt.     However over past week, and most prominently over past 1-2 days,  pt had significant ambulation problems.  On day of presentation,  pt was unable to get out of the chair and almost slipped (did not fall),  which prompted the ed visit.      On ambulance ride to hospital,  pt also suffered new back pain from the bumpy ride. (22 Dec 2018 23:26)      PAST MEDICAL & SURGICAL HISTORY:  HTN (hypertension)  Breast CA: S/P surgery and on hormone therapy  Status post knee replacement: B/L  H/O mastectomy, right      Hospital Course: seen by ortho- xrays knees no new fx- may need  Jermain tkr revision- to follow with ortho as OPD    TODAY'S SUBJECTIVE & REVIEW OF SYMPTOMS:     Constitutional Weakness   Cardio WNL   Resp WNL   GI WNL  Heme WNL  Endo WNL  Skin WNL  MSK l >R knee pain/instability  Neuro WNL  Cognitive WNL  Psych WNL      MEDICATIONS  (STANDING):  amLODIPine   Tablet 5 milliGRAM(s) Oral daily  anastrozole 1 milliGRAM(s) Oral daily  chlorhexidine 4% Liquid 1 Application(s) Topical <User Schedule>  enoxaparin Injectable 40 milliGRAM(s) SubCutaneous daily    MEDICATIONS  (PRN):  ketorolac   Injectable 15 milliGRAM(s) IV Push every 8 hours PRN Severe Pain (7 - 10)      FAMILY HISTORY:      Allergies    No Known Allergies    Intolerances        SOCIAL HISTORY:    [    ] Etoh  [    ] Smoking  [    ] Substance abuse     Home Environment:  [    ] Home Alone  [   x ] Lives with Family DAUGHTER WHO WORKS  [    ] Home Health Aid    Dwelling:  [    ] Apartment  [   x ] Private House  [    ] Adult Home  [    ] Skilled Nursing Facility      [    ] Short Term  [    ] Long Term  [    ] Stairs  3 OUTSIDE                         [    ] Elevator     FUNCTIONAL STATUS PTA: (Check all that apply)  Ambulation: [     ]Independent    [    ] Dependent     [    ] Non-Ambulatory  Assistive Device: [  x  ] SA Cane  [    ]  Q Cane  [    ] Walker  [    ]  Wheelchair  ADL : [    ] Independent  [    ]  Dependent       Vital Signs Last 24 Hrs  T(C): 36.9 (24 Dec 2018 07:41), Max: 36.9 (24 Dec 2018 07:41)  T(F): 98.4 (24 Dec 2018 07:41), Max: 98.4 (24 Dec 2018 07:41)  HR: 78 (24 Dec 2018 07:41) (71 - 87)  BP: 114/55 (24 Dec 2018 07:41) (103/70 - 118/69)  BP(mean): --  RR: 198 (24 Dec 2018 07:41) (18 - 198)  SpO2: 95% (24 Dec 2018 05:09) (95% - 95%)      PHYSICAL EXAM: Alert & Oriented X3  GENERAL: NAD, well-groomed, well-developed  HEAD:  Atraumatic, Normocephalic  EYES: EOMI, PERRLA, conjunctiva and sclera clear  NECK: Supple, No JVD, Normal thyroid  CHEST/LUNG: Clear bilaterally; No rales, rhonchi, wheezing, or rubs  HEART: Regular rate and rhythm; No murmurs, rubs, or gallops  ABDOMEN: Soft, Nontender, Nondistended; Bowel sounds present  EXTREMITIES:  2+ Peripheral Pulses, No clubbing, cyanosis, or edema    NERVOUS SYSTEM:  Cranial Nerves 2-12 intact [ x   ] Abnormal  [    ]  ROM: WFL all extremities [    ]  Abnormal [  x   ]Limited terminal extension L knee  Motor Strength: WFL all extremities  [    ]  Abnormal [ x   ]4-/5 LES  Sensation: intact to light touch [  x  ] Abnormal [    ]      FUNCTIONAL STATUS:  Bed Mobility: [   ]  Independent [    ]  Supervision [   x ]  Needs Assistance [  ]  N/A  Transfers: [    ]  Independent [    ]  Supervision [   x ]  Needs Assistance [    ]  N/A    Ambulation:  [    ]  Independent [    ]  Supervision [ x   ]  Needs Assistance [    ]  N/A   ADL:  [    ]   Independent [   x ] Requires Assistance [    ] N/A   MIN A TRANSFERS, MIN A RW 20'    LABS:                        10.1   6.84  )-----------( 169      ( 23 Dec 2018 09:48 )             31.7     12-23    142  |  104  |  22<H>  ----------------------------<  136<H>  3.3<L>   |  23  |  1.0    Ca    9.1      23 Dec 2018 09:48  Mg     2.0     12-23    TPro  5.5<L>  /  Alb  3.4<L>  /  TBili  0.6  /  DBili  x   /  AST  22  /  ALT  13  /  AlkPhos  90  12-23    PT/INR - ( 23 Dec 2018 09:48 )   PT: 11.80 sec;   INR: 1.03 ratio         PTT - ( 22 Dec 2018 18:06 )  PTT:24.9 sec      RADIOLOGY & ADDITIONAL STUDIES:

## 2018-12-25 RX ADMIN — AMLODIPINE BESYLATE 5 MILLIGRAM(S): 2.5 TABLET ORAL at 05:16

## 2018-12-25 RX ADMIN — ANASTROZOLE 1 MILLIGRAM(S): 1 TABLET ORAL at 11:10

## 2018-12-25 RX ADMIN — ENOXAPARIN SODIUM 40 MILLIGRAM(S): 100 INJECTION SUBCUTANEOUS at 11:10

## 2018-12-25 NOTE — PROGRESS NOTE ADULT - ASSESSMENT
78F presents with b/l knee pain over the past couple of months, L>R, with increasing left knee pain over the past few weeks limiting ability to ambulate.     ASSESSMENT:  Principal Diagnosis:  right lateral tibial plateau fracture      Associated Active Comorbid Conditions:  HTN (hypertension)  Breast CA: S/P surgery and on hormone therapy  Status post knee replacement: B/L  H/O mastectomy, right      PLAN:   -ortho consult appreciated. revision LTKA as outpatient  -WBAT  -PT, OOB, pain control,   - continue with home meds  -F/U as outpatient with Dr. Hernández at [address] [facility] [phone]   -PT and OT evaluation and treatment.   -rehab consult  Discharge Disposition: stable for discharge to rehab. /CM for placement.

## 2018-12-26 DIAGNOSIS — C50.919 MALIGNANT NEOPLASM OF UNSPECIFIED SITE OF UNSPECIFIED FEMALE BREAST: ICD-10-CM

## 2018-12-26 DIAGNOSIS — I10 ESSENTIAL (PRIMARY) HYPERTENSION: ICD-10-CM

## 2018-12-26 DIAGNOSIS — Z29.9 ENCOUNTER FOR PROPHYLACTIC MEASURES, UNSPECIFIED: ICD-10-CM

## 2018-12-26 DIAGNOSIS — M25.569 PAIN IN UNSPECIFIED KNEE: ICD-10-CM

## 2018-12-26 LAB
ANION GAP SERPL CALC-SCNC: 14 MMOL/L — SIGNIFICANT CHANGE UP (ref 7–14)
BASOPHILS # BLD AUTO: 0.03 K/UL — SIGNIFICANT CHANGE UP (ref 0–0.2)
BASOPHILS NFR BLD AUTO: 0.5 % — SIGNIFICANT CHANGE UP (ref 0–1)
BUN SERPL-MCNC: 23 MG/DL — HIGH (ref 10–20)
CALCIUM SERPL-MCNC: 9.4 MG/DL — SIGNIFICANT CHANGE UP (ref 8.5–10.1)
CHLORIDE SERPL-SCNC: 103 MMOL/L — SIGNIFICANT CHANGE UP (ref 98–110)
CO2 SERPL-SCNC: 25 MMOL/L — SIGNIFICANT CHANGE UP (ref 17–32)
CREAT SERPL-MCNC: 0.9 MG/DL — SIGNIFICANT CHANGE UP (ref 0.7–1.5)
EOSINOPHIL # BLD AUTO: 0.17 K/UL — SIGNIFICANT CHANGE UP (ref 0–0.7)
EOSINOPHIL NFR BLD AUTO: 2.7 % — SIGNIFICANT CHANGE UP (ref 0–8)
GLUCOSE SERPL-MCNC: 102 MG/DL — HIGH (ref 70–99)
HCT VFR BLD CALC: 31.5 % — LOW (ref 37–47)
HGB BLD-MCNC: 10.1 G/DL — LOW (ref 12–16)
IMM GRANULOCYTES NFR BLD AUTO: 0.3 % — SIGNIFICANT CHANGE UP (ref 0.1–0.3)
LYMPHOCYTES # BLD AUTO: 0.93 K/UL — LOW (ref 1.2–3.4)
LYMPHOCYTES # BLD AUTO: 14.5 % — LOW (ref 20.5–51.1)
MCHC RBC-ENTMCNC: 29.7 PG — SIGNIFICANT CHANGE UP (ref 27–31)
MCHC RBC-ENTMCNC: 32.1 G/DL — SIGNIFICANT CHANGE UP (ref 32–37)
MCV RBC AUTO: 92.6 FL — SIGNIFICANT CHANGE UP (ref 81–99)
MONOCYTES # BLD AUTO: 0.5 K/UL — SIGNIFICANT CHANGE UP (ref 0.1–0.6)
MONOCYTES NFR BLD AUTO: 7.8 % — SIGNIFICANT CHANGE UP (ref 1.7–9.3)
NEUTROPHILS # BLD AUTO: 4.75 K/UL — SIGNIFICANT CHANGE UP (ref 1.4–6.5)
NEUTROPHILS NFR BLD AUTO: 74.2 % — SIGNIFICANT CHANGE UP (ref 42.2–75.2)
NRBC # BLD: 0 /100 WBCS — SIGNIFICANT CHANGE UP (ref 0–0)
PLATELET # BLD AUTO: 148 K/UL — SIGNIFICANT CHANGE UP (ref 130–400)
POTASSIUM SERPL-MCNC: 4 MMOL/L — SIGNIFICANT CHANGE UP (ref 3.5–5)
POTASSIUM SERPL-SCNC: 4 MMOL/L — SIGNIFICANT CHANGE UP (ref 3.5–5)
RBC # BLD: 3.4 M/UL — LOW (ref 4.2–5.4)
RBC # FLD: 12.9 % — SIGNIFICANT CHANGE UP (ref 11.5–14.5)
SODIUM SERPL-SCNC: 142 MMOL/L — SIGNIFICANT CHANGE UP (ref 135–146)
WBC # BLD: 6.4 K/UL — SIGNIFICANT CHANGE UP (ref 4.8–10.8)
WBC # FLD AUTO: 6.4 K/UL — SIGNIFICANT CHANGE UP (ref 4.8–10.8)

## 2018-12-26 RX ADMIN — ENOXAPARIN SODIUM 40 MILLIGRAM(S): 100 INJECTION SUBCUTANEOUS at 11:44

## 2018-12-26 RX ADMIN — CHLORHEXIDINE GLUCONATE 1 APPLICATION(S): 213 SOLUTION TOPICAL at 05:12

## 2018-12-26 RX ADMIN — ANASTROZOLE 1 MILLIGRAM(S): 1 TABLET ORAL at 11:44

## 2018-12-26 RX ADMIN — AMLODIPINE BESYLATE 5 MILLIGRAM(S): 2.5 TABLET ORAL at 05:12

## 2018-12-26 NOTE — PROGRESS NOTE ADULT - ASSESSMENT
- Imaging shows no acute fracture/injury  - Patient due for revision of left TKA but will happen as outpatient  - WBAT on left knee  - PT following  - Rehab consult: STR in SNF  - Patient is medically stable for discharge, awaiting placement  - HTN - continue amlodipine  - DVT ppx

## 2018-12-26 NOTE — PROGRESS NOTE ADULT - SUBJECTIVE AND OBJECTIVE BOX
SUBJECTIVE:    Patient is a 78y old Female who presents with a chief complaint of L KNEE PAIN / INABILITY TO AMBULATE (26 Dec 2018 15:37)    Stable, no acute events overnight.    PAST MEDICAL & SURGICAL HISTORY  HTN (hypertension)  Breast CA: S/P surgery and on hormone therapy  Status post knee replacement: B/L  H/O mastectomy, right       ALLERGIES:  No Known Allergies    MEDICATIONS:  STANDING MEDICATIONS  amLODIPine   Tablet 5 milliGRAM(s) Oral daily  anastrozole 1 milliGRAM(s) Oral daily  chlorhexidine 4% Liquid 1 Application(s) Topical <User Schedule>  enoxaparin Injectable 40 milliGRAM(s) SubCutaneous daily    PRN MEDICATIONS  ketorolac   Injectable 15 milliGRAM(s) IV Push every 8 hours PRN    VITALS:   T(F): 97.8  HR: 88  BP: 119/82  RR: 19  SpO2: --    LABS:                        10.1   6.40  )-----------( 148      ( 26 Dec 2018 06:06 )             31.5     12-26    142  |  103  |  23<H>  ----------------------------<  102<H>  4.0   |  25  |  0.9    Ca    9.4      26 Dec 2018 06:06                        12-25-18 @ 07:01  -  12-26-18 @ 07:00  --------------------------------------------------------  IN: 680 mL / OUT: 750 mL / NET: -70 mL          PHYSICAL EXAM:  GEN: NAD, comfortable  LUNGS: CTAB, no w/r/r  HEART: RRR, no m/r/g  ABD: soft, NT/ND, +BS  EXT: bilateral lower ext edema  NEURO: AAOx3

## 2018-12-26 NOTE — PROGRESS NOTE ADULT - SUBJECTIVE AND OBJECTIVE BOX
Patient is a 78y old  Female who presents with a chief complaint of L KNEE PAIN / INABILITY TO AMBULATE (25 Dec 2018 11:40)      SUBJECTIVE / OVERNIGHT EVENTS:    MEDICATIONS  (STANDING):  amLODIPine   Tablet 5 milliGRAM(s) Oral daily  anastrozole 1 milliGRAM(s) Oral daily  chlorhexidine 4% Liquid 1 Application(s) Topical <User Schedule>  enoxaparin Injectable 40 milliGRAM(s) SubCutaneous daily    MEDICATIONS  (PRN):  ketorolac   Injectable 15 milliGRAM(s) IV Push every 8 hours PRN Severe Pain (7 - 10)        CAPILLARY BLOOD GLUCOSE        I&O's Summary    25 Dec 2018 07:01  -  26 Dec 2018 07:00  --------------------------------------------------------  IN: 680 mL / OUT: 750 mL / NET: -70 mL        PHYSICAL EXAM:  GENERAL:   EYES:  NECK:   CHEST/LUNG:   HEART:   ABDOMEN:   EXTREMITIES:    PSYCH:   NEUROLOGY:   SKIN:    LABS:                        10.1   6.40  )-----------( 148      ( 26 Dec 2018 06:06 )             31.5     12-26    142  |  103  |  23<H>  ----------------------------<  102<H>  4.0   |  25  |  0.9    Ca    9.4      26 Dec 2018 06:06                RADIOLOGY & ADDITIONAL TESTS:    Imaging Personally Reviewed:  Yes    Consultant(s) Notes Reviewed:      Care Discussed with Consultants/Other Providers:    Assessment and Plan   PAST MEDICAL & SURGICAL HISTORY:  HTN (hypertension)  Breast CA: S/P surgery and on hormone therapy  Status post knee replacement: B/L  S/P bilateral unicompartmental knee replacement  H/O mastectomy, right Patient is a 78y old  Female who presents with a chief complaint of L KNEE PAIN / INABILITY TO AMBULATE (25 Dec 2018 11:40)      SUBJECTIVE / OVERNIGHT EVENTS:  L knee pain, sharp, improving, nonradiating, worsen with movement  no cp, sob, abd pain, fever    MEDICATIONS  (STANDING):  amLODIPine   Tablet 5 milliGRAM(s) Oral daily  anastrozole 1 milliGRAM(s) Oral daily  chlorhexidine 4% Liquid 1 Application(s) Topical <User Schedule>  enoxaparin Injectable 40 milliGRAM(s) SubCutaneous daily    MEDICATIONS  (PRN):  ketorolac   Injectable 15 milliGRAM(s) IV Push every 8 hours PRN Severe Pain (7 - 10)        CAPILLARY BLOOD GLUCOSE        I&O's Summary    25 Dec 2018 07:01  -  26 Dec 2018 07:00  --------------------------------------------------------  IN: 680 mL / OUT: 750 mL / NET: -70 mL        PHYSICAL EXAM:  GENERAL: nad, a+ ox3  EYES:  NECK: no jvd  CHEST/LUNG: ctab no w/r/r  HEART: rrr no m/g/r  ABDOMEN: soft nt nd  EXTREMITIES:    PSYCH: nl affect  NEUROLOGY:   SKIN: no ulcers, rashes    LABS:                        10.1   6.40  )-----------( 148      ( 26 Dec 2018 06:06 )             31.5     12-26    142  |  103  |  23<H>  ----------------------------<  102<H>  4.0   |  25  |  0.9    Ca    9.4      26 Dec 2018 06:06                RADIOLOGY & ADDITIONAL TESTS:    Imaging Personally Reviewed:  Yes    Consultant(s) Notes Reviewed:      Care Discussed with Consultants/Other Providers:    Assessment and Plan   PAST MEDICAL & SURGICAL HISTORY:  HTN (hypertension)  Breast CA: S/P surgery and on hormone therapy  Status post knee replacement: B/L  S/P bilateral unicompartmental knee replacement  H/O mastectomy, right

## 2018-12-27 ENCOUNTER — TRANSCRIPTION ENCOUNTER (OUTPATIENT)
Age: 78
End: 2018-12-27

## 2018-12-27 VITALS
RESPIRATION RATE: 18 BRPM | HEART RATE: 76 BPM | DIASTOLIC BLOOD PRESSURE: 76 MMHG | SYSTOLIC BLOOD PRESSURE: 128 MMHG | TEMPERATURE: 97 F

## 2018-12-27 LAB
ANION GAP SERPL CALC-SCNC: 14 MMOL/L — SIGNIFICANT CHANGE UP (ref 7–14)
BASOPHILS # BLD AUTO: 0.04 K/UL — SIGNIFICANT CHANGE UP (ref 0–0.2)
BASOPHILS NFR BLD AUTO: 0.6 % — SIGNIFICANT CHANGE UP (ref 0–1)
BUN SERPL-MCNC: 22 MG/DL — HIGH (ref 10–20)
CALCIUM SERPL-MCNC: 9.4 MG/DL — SIGNIFICANT CHANGE UP (ref 8.5–10.1)
CHLORIDE SERPL-SCNC: 103 MMOL/L — SIGNIFICANT CHANGE UP (ref 98–110)
CO2 SERPL-SCNC: 24 MMOL/L — SIGNIFICANT CHANGE UP (ref 17–32)
CREAT SERPL-MCNC: 1 MG/DL — SIGNIFICANT CHANGE UP (ref 0.7–1.5)
EOSINOPHIL # BLD AUTO: 0.23 K/UL — SIGNIFICANT CHANGE UP (ref 0–0.7)
EOSINOPHIL NFR BLD AUTO: 3.5 % — SIGNIFICANT CHANGE UP (ref 0–8)
GLUCOSE SERPL-MCNC: 91 MG/DL — SIGNIFICANT CHANGE UP (ref 70–99)
HCT VFR BLD CALC: 33.1 % — LOW (ref 37–47)
HGB BLD-MCNC: 10.5 G/DL — LOW (ref 12–16)
IMM GRANULOCYTES NFR BLD AUTO: 0.3 % — SIGNIFICANT CHANGE UP (ref 0.1–0.3)
LYMPHOCYTES # BLD AUTO: 0.94 K/UL — LOW (ref 1.2–3.4)
LYMPHOCYTES # BLD AUTO: 14.5 % — LOW (ref 20.5–51.1)
MCHC RBC-ENTMCNC: 29.4 PG — SIGNIFICANT CHANGE UP (ref 27–31)
MCHC RBC-ENTMCNC: 31.7 G/DL — LOW (ref 32–37)
MCV RBC AUTO: 92.7 FL — SIGNIFICANT CHANGE UP (ref 81–99)
MONOCYTES # BLD AUTO: 0.51 K/UL — SIGNIFICANT CHANGE UP (ref 0.1–0.6)
MONOCYTES NFR BLD AUTO: 7.9 % — SIGNIFICANT CHANGE UP (ref 1.7–9.3)
NEUTROPHILS # BLD AUTO: 4.74 K/UL — SIGNIFICANT CHANGE UP (ref 1.4–6.5)
NEUTROPHILS NFR BLD AUTO: 73.2 % — SIGNIFICANT CHANGE UP (ref 42.2–75.2)
NRBC # BLD: 0 /100 WBCS — SIGNIFICANT CHANGE UP (ref 0–0)
PLATELET # BLD AUTO: 148 K/UL — SIGNIFICANT CHANGE UP (ref 130–400)
POTASSIUM SERPL-MCNC: 4 MMOL/L — SIGNIFICANT CHANGE UP (ref 3.5–5)
POTASSIUM SERPL-SCNC: 4 MMOL/L — SIGNIFICANT CHANGE UP (ref 3.5–5)
RBC # BLD: 3.57 M/UL — LOW (ref 4.2–5.4)
RBC # FLD: 12.8 % — SIGNIFICANT CHANGE UP (ref 11.5–14.5)
SODIUM SERPL-SCNC: 141 MMOL/L — SIGNIFICANT CHANGE UP (ref 135–146)
WBC # BLD: 6.48 K/UL — SIGNIFICANT CHANGE UP (ref 4.8–10.8)
WBC # FLD AUTO: 6.48 K/UL — SIGNIFICANT CHANGE UP (ref 4.8–10.8)

## 2018-12-27 RX ADMIN — CHLORHEXIDINE GLUCONATE 1 APPLICATION(S): 213 SOLUTION TOPICAL at 06:37

## 2018-12-27 RX ADMIN — ANASTROZOLE 1 MILLIGRAM(S): 1 TABLET ORAL at 11:18

## 2018-12-27 RX ADMIN — AMLODIPINE BESYLATE 5 MILLIGRAM(S): 2.5 TABLET ORAL at 06:37

## 2018-12-27 RX ADMIN — ENOXAPARIN SODIUM 40 MILLIGRAM(S): 100 INJECTION SUBCUTANEOUS at 11:18

## 2018-12-27 NOTE — PROGRESS NOTE ADULT - PROBLEM SELECTOR PLAN 1
plan for outpt revision with Dr. José Miguel clements  PT  pain control  plan to d/c today to rehab  has appointment set up with ortho on Michael 15

## 2018-12-27 NOTE — DISCHARGE NOTE ADULT - PLAN OF CARE
You had previous bilateral knee replacement for which you will need to follow with the orthopedic team You will need surgery to revise your prior knee replacement on the left knee. You will need surgery to revise your prior knee replacement on the left knee. You will need to follow up as outpatient with Dr. Hernández at [address] [facility], [phone]. In the meantime your knee will be weight bearing as tolerated. When you walk please use the walker for assistance. You can also have staff/family assist with walking as needed. You will need to regularly work with physical therapy while you are at the nursing facility. Please also follow up with your family doctor within 1 week of discharge from the hospital. Continued treatment Please continue with your current medication regiment as outlined in this document. You will need to follow up with your primary care doctor within 1 week of discharge from the hospital. Continued treatment as prescribed Please continue your Anastrozole as prescribed and follow up with your primary care doctor in 1 week.

## 2018-12-27 NOTE — DISCHARGE NOTE ADULT - HOSPITAL COURSE
78y F w/ PMH of double knee replacement 25 years ago, HTN,  Breast CA s/p surgery/on hormone tx, presented with complaint of worsening L knee pain, affecting ability to ambulate over past few days prior to admission. Patient had outpatient workup which indicated patient needed revision of left knee replacement however due to the pain and clicking in the knee she was getting on ambulation she decided to come into the hospital. On presentation knee x-ray showed no acute fracture. Orthopedics was consulted and they recommended that patient continue with her outpatient follow up for left knee replacement revision. Patients pain improved however the clicking she has on movement remains. She will be discharged to nursing home for short term rehab. 78y F w/ PMH of double knee replacement 25 years ago, HTN,  Breast CA s/p surgery/on hormone tx, presented with complaint of worsening L knee pain, affecting ability to ambulate over past few days prior to admission. Patient had outpatient workup which indicated patient needed revision of left knee replacement however due to the pain and clicking in the knee she was getting on ambulation she decided to come into the hospital. On presentation knee x-ray showed no acute fracture. Orthopedics was consulted and they recommended that patient continue with her outpatient follow up for left knee replacement revision. Patients pain improved however the clicking she has on movement remains. She will be discharged to nursing home for short term rehab.     31 minutes spent on discharge planning

## 2018-12-27 NOTE — DISCHARGE NOTE ADULT - MEDICATION SUMMARY - MEDICATIONS TO TAKE
I will START or STAY ON the medications listed below when I get home from the hospital:    anastrozole 1 mg oral tablet  -- 1 tab(s) by mouth once a day  -- Indication: For Breast CAncer    AMLODIPINE BESYLATE 5 MG TABS  -- Indication: For Hypertension

## 2018-12-27 NOTE — DISCHARGE NOTE ADULT - ADDITIONAL INSTRUCTIONS
Please follow up with Dr. Hernández at [address] [facility], [phone]. Call this office for an appointment within 1-2 weeks after discharge. Also please follow up with your family doctor (Dr. Newman) within 1 week of discharge.

## 2018-12-27 NOTE — PROGRESS NOTE ADULT - SUBJECTIVE AND OBJECTIVE BOX
Patient is a 78y old  Female who presents with a chief complaint of L KNEE PAIN / INABILITY TO AMBULATE (27 Dec 2018 10:51)      SUBJECTIVE / OVERNIGHT EVENTS:  no cp, sob, abd pain, fever    MEDICATIONS  (STANDING):  amLODIPine   Tablet 5 milliGRAM(s) Oral daily  anastrozole 1 milliGRAM(s) Oral daily  chlorhexidine 4% Liquid 1 Application(s) Topical <User Schedule>  enoxaparin Injectable 40 milliGRAM(s) SubCutaneous daily    MEDICATIONS  (PRN):  ketorolac   Injectable 15 milliGRAM(s) IV Push every 8 hours PRN Severe Pain (7 - 10)        CAPILLARY BLOOD GLUCOSE        I&O's Summary      PHYSICAL EXAM:  GENERAL: nad, a+o x3  EYES:  NECK: no jvd  CHEST/LUNG: ctab no w/r/r  HEART: rrr no m/g/r  ABDOMEN: soft nt nd  EXTREMITIES:  sl nonpit edema bl le  PSYCH: nl affect  NEUROLOGY:   SKIN:    LABS:                        10.5   6.48  )-----------( 148      ( 27 Dec 2018 07:21 )             33.1     12-27    141  |  103  |  22<H>  ----------------------------<  91  4.0   |  24  |  1.0    Ca    9.4      27 Dec 2018 07:21                RADIOLOGY & ADDITIONAL TESTS:    Imaging Personally Reviewed:  Yes    Consultant(s) Notes Reviewed:      Care Discussed with Consultants/Other Providers:    Assessment and Plan   PAST MEDICAL & SURGICAL HISTORY:  HTN (hypertension)  Breast CA: S/P surgery and on hormone therapy  Status post knee replacement: B/L  S/P bilateral unicompartmental knee replacement  H/O mastectomy, right

## 2018-12-27 NOTE — CHART NOTE - NSCHARTNOTEFT_GEN_A_CORE
<<<RESIDENT DISCHARGE NOTE>>>     BHAVIN CHATMAN  MRN-[id number]    VITAL SIGNS:  T(F): 97.1 (12-27-18 @ 07:30), Max: 97.2 (12-26-18 @ 23:00)  HR: 76 (12-27-18 @ 07:30)  BP: 128/76 (12-27-18 @ 07:30)  SpO2: --      PHYSICAL EXAMINATION:  GEN: NAD, comfortable  LUNGS: CTAB, no w/r/r  HEART: RRR, no m/r/g  ABD: soft, NT/ND, +BS  EXT: bilateral lower ext edema  NEURO: AAOx3    TEST RESULTS:                        10.5   6.48  )-----------( 148      ( 27 Dec 2018 07:21 )             33.1       12-27    141  |  103  |  22<H>  ----------------------------<  91  4.0   |  24  |  1.0    Ca    9.4      27 Dec 2018 07:21        FINAL DISCHARGE INTERVIEW:  Resident(s) Present: (Name:______Brandon Harris_______), RN Present: (Name:  ___________)    DISCHARGE MEDICATION RECONCILIATION  reviewed with Attending (Name:______Dr. Arriaga_____)    DISPOSITION:   [  ] Home,    [  ] Home with Visiting Nursing Services,   [  X  ]  SNF/ NH,    [   ] Acute Rehab (4A),   [   ] Other (Specify:_________)    Patient stable for discharge understands that she will need to follow up with the orthopedics team regarding her revision.

## 2018-12-27 NOTE — PROGRESS NOTE ADULT - REASON FOR ADMISSION
L KNEE PAIN / INABILITY TO AMBULATE

## 2018-12-27 NOTE — DISCHARGE NOTE ADULT - CARE PLAN
Principal Discharge DX:	Knee pain  Goal:	You had previous bilateral knee replacement for which you will need to follow with the orthopedic team  Assessment and plan of treatment:	You will need surgery to revise your prior knee replacement on the left knee. Principal Discharge DX:	Knee pain  Goal:	You had previous bilateral knee replacement for which you will need to follow with the orthopedic team  Assessment and plan of treatment:	You will need surgery to revise your prior knee replacement on the left knee. You will need to follow up as outpatient with Dr. Hernández at [address] [place] [address], [phone]. In the meantime your knee will be weight bearing as tolerated. When you walk please use the walker for assistance. You can also have staff/family assist with walking as needed. You will need to regularly work with physical therapy while you are at the nursing facility. Please also follow up with your family doctor within 1 week of discharge from the hospital.  Secondary Diagnosis:	HTN (hypertension)  Goal:	Continued treatment  Assessment and plan of treatment:	Please continue with your current medication regiment as outlined in this document. You will need to follow up with your primary care doctor within 1 week of discharge from the hospital.  Secondary Diagnosis:	Breast CA  Goal:	Continued treatment as prescribed  Assessment and plan of treatment:	Please continue your Anastrozole as prescribed and follow up with your primary care doctor in 1 week.

## 2018-12-27 NOTE — DISCHARGE NOTE ADULT - PATIENT PORTAL LINK FT
You can access the [facility], offered by [facility], by registering with the following website: [email or web address]

## 2018-12-28 ENCOUNTER — OUTPATIENT (OUTPATIENT)
Dept: OUTPATIENT SERVICES | Facility: HOSPITAL | Age: 78
LOS: 1 days | Discharge: HOME | End: 2018-12-28

## 2018-12-28 DIAGNOSIS — Z90.11 ACQUIRED ABSENCE OF RIGHT BREAST AND NIPPLE: Chronic | ICD-10-CM

## 2018-12-28 DIAGNOSIS — Z96.659 PRESENCE OF UNSPECIFIED ARTIFICIAL KNEE JOINT: Chronic | ICD-10-CM

## 2018-12-29 DIAGNOSIS — R05 COUGH: ICD-10-CM

## 2018-12-30 PROBLEM — I10 ESSENTIAL (PRIMARY) HYPERTENSION: Chronic | Status: ACTIVE | Noted: 2018-12-22

## 2018-12-31 DIAGNOSIS — C50.911 MALIGNANT NEOPLASM OF UNSPECIFIED SITE OF RIGHT FEMALE BREAST: ICD-10-CM

## 2018-12-31 DIAGNOSIS — I10 ESSENTIAL (PRIMARY) HYPERTENSION: ICD-10-CM

## 2018-12-31 DIAGNOSIS — Z96.653 PRESENCE OF ARTIFICIAL KNEE JOINT, BILATERAL: ICD-10-CM

## 2018-12-31 DIAGNOSIS — T84.84XA PAIN DUE TO INTERNAL ORTHOPEDIC PROSTHETIC DEVICES, IMPLANTS AND GRAFTS, INITIAL ENCOUNTER: ICD-10-CM

## 2018-12-31 DIAGNOSIS — R53.1 WEAKNESS: ICD-10-CM

## 2018-12-31 DIAGNOSIS — Z90.11 ACQUIRED ABSENCE OF RIGHT BREAST AND NIPPLE: ICD-10-CM
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
## 2019-01-01 ENCOUNTER — OUTPATIENT (OUTPATIENT)
Dept: OUTPATIENT SERVICES | Facility: HOSPITAL | Age: 79
LOS: 1 days | Discharge: HOME | End: 2019-01-01

## 2019-01-01 DIAGNOSIS — Z90.11 ACQUIRED ABSENCE OF RIGHT BREAST AND NIPPLE: Chronic | ICD-10-CM

## 2019-01-01 DIAGNOSIS — D64.9 ANEMIA, UNSPECIFIED: ICD-10-CM

## 2019-01-01 DIAGNOSIS — Z96.659 PRESENCE OF UNSPECIFIED ARTIFICIAL KNEE JOINT: Chronic | ICD-10-CM

## 2019-01-15 NOTE — PHYSICAL THERAPY INITIAL EVALUATION ADULT - PHYSICAL ASSIST/NONPHYSICAL ASSIST: SIT/STAND, REHAB EVAL
Patient would like to be contacted when [facility] fax comes through. She said she went for an upper respiratory infection and they didn't prescribe her anything and would like to be prescribed something if possible. 1 person assist

## 2019-03-16 ENCOUNTER — INPATIENT (INPATIENT)
Facility: HOSPITAL | Age: 79
LOS: 5 days | Discharge: NURSING, REHAB OR LONG-TERM CARE | End: 2019-03-22
Attending: INTERNAL MEDICINE | Admitting: INTERNAL MEDICINE

## 2019-03-16 VITALS
SYSTOLIC BLOOD PRESSURE: 129 MMHG | WEIGHT: 149.91 LBS | TEMPERATURE: 99 F | HEIGHT: 65 IN | OXYGEN SATURATION: 99 % | DIASTOLIC BLOOD PRESSURE: 69 MMHG | RESPIRATION RATE: 18 BRPM | HEART RATE: 94 BPM

## 2019-03-16 DIAGNOSIS — Z90.11 ACQUIRED ABSENCE OF RIGHT BREAST AND NIPPLE: Chronic | ICD-10-CM

## 2019-03-16 DIAGNOSIS — Z96.659 PRESENCE OF UNSPECIFIED ARTIFICIAL KNEE JOINT: Chronic | ICD-10-CM

## 2019-03-16 LAB
ALBUMIN SERPL ELPH-MCNC: 3.8 G/DL — SIGNIFICANT CHANGE UP (ref 3.5–5.2)
ALP SERPL-CCNC: 108 U/L — SIGNIFICANT CHANGE UP (ref 30–115)
ALT FLD-CCNC: 9 U/L — SIGNIFICANT CHANGE UP (ref 0–41)
ANION GAP SERPL CALC-SCNC: 15 MMOL/L — HIGH (ref 7–14)
AST SERPL-CCNC: 17 U/L — SIGNIFICANT CHANGE UP (ref 0–41)
BASOPHILS # BLD AUTO: 0.05 K/UL — SIGNIFICANT CHANGE UP (ref 0–0.2)
BASOPHILS NFR BLD AUTO: 0.4 % — SIGNIFICANT CHANGE UP (ref 0–1)
BILIRUB SERPL-MCNC: 0.5 MG/DL — SIGNIFICANT CHANGE UP (ref 0.2–1.2)
BUN SERPL-MCNC: 28 MG/DL — HIGH (ref 10–20)
CALCIUM SERPL-MCNC: 9.4 MG/DL — SIGNIFICANT CHANGE UP (ref 8.5–10.1)
CHLORIDE SERPL-SCNC: 108 MMOL/L — SIGNIFICANT CHANGE UP (ref 98–110)
CO2 SERPL-SCNC: 23 MMOL/L — SIGNIFICANT CHANGE UP (ref 17–32)
CREAT SERPL-MCNC: 1.2 MG/DL — SIGNIFICANT CHANGE UP (ref 0.7–1.5)
EOSINOPHIL # BLD AUTO: 0.08 K/UL — SIGNIFICANT CHANGE UP (ref 0–0.7)
EOSINOPHIL NFR BLD AUTO: 0.7 % — SIGNIFICANT CHANGE UP (ref 0–8)
GLUCOSE SERPL-MCNC: 114 MG/DL — HIGH (ref 70–99)
HCT VFR BLD CALC: 33.4 % — LOW (ref 37–47)
HGB BLD-MCNC: 10.2 G/DL — LOW (ref 12–16)
IMM GRANULOCYTES NFR BLD AUTO: 0.4 % — HIGH (ref 0.1–0.3)
LYMPHOCYTES # BLD AUTO: 0.77 K/UL — LOW (ref 1.2–3.4)
LYMPHOCYTES # BLD AUTO: 6.9 % — LOW (ref 20.5–51.1)
MCHC RBC-ENTMCNC: 29.6 PG — SIGNIFICANT CHANGE UP (ref 27–31)
MCHC RBC-ENTMCNC: 30.5 G/DL — LOW (ref 32–37)
MCV RBC AUTO: 96.8 FL — SIGNIFICANT CHANGE UP (ref 81–99)
MONOCYTES # BLD AUTO: 0.8 K/UL — HIGH (ref 0.1–0.6)
MONOCYTES NFR BLD AUTO: 7.2 % — SIGNIFICANT CHANGE UP (ref 1.7–9.3)
NEUTROPHILS # BLD AUTO: 9.39 K/UL — HIGH (ref 1.4–6.5)
NEUTROPHILS NFR BLD AUTO: 84.4 % — HIGH (ref 42.2–75.2)
NRBC # BLD: 0 /100 WBCS — SIGNIFICANT CHANGE UP (ref 0–0)
PLATELET # BLD AUTO: 203 K/UL — SIGNIFICANT CHANGE UP (ref 130–400)
POTASSIUM SERPL-MCNC: 4.1 MMOL/L — SIGNIFICANT CHANGE UP (ref 3.5–5)
POTASSIUM SERPL-SCNC: 4.1 MMOL/L — SIGNIFICANT CHANGE UP (ref 3.5–5)
PROT SERPL-MCNC: 6.4 G/DL — SIGNIFICANT CHANGE UP (ref 6–8)
RBC # BLD: 3.45 M/UL — LOW (ref 4.2–5.4)
RBC # FLD: 14.6 % — HIGH (ref 11.5–14.5)
SODIUM SERPL-SCNC: 146 MMOL/L — SIGNIFICANT CHANGE UP (ref 135–146)
WBC # BLD: 11.13 K/UL — HIGH (ref 4.8–10.8)
WBC # FLD AUTO: 11.13 K/UL — HIGH (ref 4.8–10.8)

## 2019-03-16 RX ORDER — ACETAMINOPHEN 500 MG
650 TABLET ORAL ONCE
Qty: 0 | Refills: 0 | Status: COMPLETED | OUTPATIENT
Start: 2019-03-16 | End: 2019-03-16

## 2019-03-16 RX ADMIN — Medication 650 MILLIGRAM(S): at 23:49

## 2019-03-16 NOTE — ED PROVIDER NOTE - ATTENDING CONTRIBUTION TO CARE
77 yo f hx htn, br ca s/p mastectomy on hormone therapy, b/l knee replacement x25 years  pt previously at at [facility] for rehab  pt here for L hip pain  sx started 2 weeks ago  pt schedule to get L hip surgery May 1 w/ Dr. Hernández  per daughter, pt more apprehensive to walk on L leg because of ?pain.   no severe trauma (pt fell onto commode from small height today)  pt denies cough, fever, chills, ap, n,v,dysuria      vss, T 99.4  gen- NAD, aaox3  card-rrr  lungs-ctab, no wheezing or rhonchi  abd-sntnd, no guarding or rebound  neuro- full str/sensation, cn ii-xii grossly intact, normal coordination  Hip- full ROM to L hip, pt refusing to ambulate

## 2019-03-16 NOTE — ED ADULT NURSE NOTE - INTERVENTIONS DEFINITIONS
Call bell, personal items and telephone within reach/Provide visual cue, wrist band, yellow gown, etc./Stretcher in lowest position, wheels locked, appropriate side rails in place/Non-slip footwear when patient is off stretcher/Physically safe environment: no spills, clutter or unnecessary equipment/[place] to call system/Instruct patient to call for assistance

## 2019-03-16 NOTE — ED ADULT NURSE NOTE - OBJECTIVE STATEMENT
brought in by ems w complaint of generalized weakness and left hip pain. hx of recent fall this am from commode.

## 2019-03-16 NOTE — ED PROVIDER NOTE - CLINICAL SUMMARY MEDICAL DECISION MAKING FREE TEXT BOX
pt here for acute on chronic L hip pain. no sig trauma (minor fall into commode). xr showing arthritic/degen changes. admit for inability to ambulate, gait instability, pt eval, ortho eval

## 2019-03-16 NOTE — ED PROVIDER NOTE - CARE PLAN
Principal Discharge DX:	Hip pain  Secondary Diagnosis:	Osteoarthritis  Secondary Diagnosis:	Ambulatory dysfunction

## 2019-03-16 NOTE — ED PROVIDER NOTE - NS ED ROS FT
Review of Systems:  CONSTITUTIONAL: no fever  EYES: no photophobia, no blurred vision  ENT: no ear pain, no nasal discharge  RESPIRATORY: no shortness of breath, no cough  CARDIAC: no chest pain, no palpitations  GI: no abd pain, no nausea, no vomiting,  : no dysuria; no hematuria,   MUSCULOSKELETAL: +L hip pain   NEUROLOGIC: no headache,   PSYCH: no anxiety, non suicidal, non homicidal, no hallucination, no depression

## 2019-03-16 NOTE — ED PROVIDER NOTE - PHYSICAL EXAMINATION
VITAL SIGNS: I have reviewed nursing notes and confirm.  CONSTITUTIONAL: Well-developed; well-nourished; in no acute distress.  SKIN: Skin exam is warm and dry, <2 sec cap refill  HEAD: Normocephalic; atraumatic.  EYES: PERRL, EOM intact; normal conjunctiva.  ENT: MMM; airway clear.   NECK: Supple; non tender.  CARD: RRR, 2+ dp pulses  RESP: No wheezes, rales or rhonchi, speaking in full sentences  ABD: soft non tender. No CVA tenderness Negative Mcburneys, Negative [place], Negative Psoas, Negative obturator   EXT: ROM of b/.l lower extremities at baseline (R side has more ROM than L). NVI. TTP along L greater trochanter/L lateral hip. pt hesitent to ambulate in the ED without her walker.   NEURO: Alert, oriented. Grossly unremarkable. No focal deficits.  PSYCH: Cooperative, appropriate.

## 2019-03-16 NOTE — ED PROVIDER NOTE - OBJECTIVE STATEMENT
79 y/o F, h/o b/l knee replacement 25 years ago, breast cancer s/p mastectomy currently on hormone therapy, presents with L  hip pain onset 2 weeks ago. 79 y/o F, h/o HTN, osteoarthritis, b/l knee replacement 25 years ago, breast cancer s/p mastectomy currently on hormone therapy, presents with L  hip pain worsening for the past 2 weeks. pt was recently admitted a few months ago d/t difficulty ambulating from knee pain from b/l knee replacement and was sent to [facility] for rehab. pt was discharged from rehab at the end of jan and states she has been ambulating with a walker but reports increased pain and difficulty ambulating. pts daughter thinks pt has become more apprehensive with walking now that she is no longer in rehab. pt is followed by Dr. Hernández (ortho) and is scheduled for hip preplacement on may 1. Pt states she fell onto her commode rail today, but no other trauma or injury. denies fevers, chills, SOB, chest pain, syncope, numbness, tingling, weakness.

## 2019-03-17 LAB
ALBUMIN SERPL ELPH-MCNC: 3.2 G/DL — LOW (ref 3.5–5.2)
ALP SERPL-CCNC: 86 U/L — SIGNIFICANT CHANGE UP (ref 30–115)
ALT FLD-CCNC: 7 U/L — SIGNIFICANT CHANGE UP (ref 0–41)
ANION GAP SERPL CALC-SCNC: 11 MMOL/L — SIGNIFICANT CHANGE UP (ref 7–14)
APPEARANCE UR: CLEAR — SIGNIFICANT CHANGE UP
APTT BLD: 26.5 SEC — LOW (ref 27–39.2)
AST SERPL-CCNC: 14 U/L — SIGNIFICANT CHANGE UP (ref 0–41)
BASOPHILS # BLD AUTO: 0.03 K/UL — SIGNIFICANT CHANGE UP (ref 0–0.2)
BASOPHILS NFR BLD AUTO: 0.4 % — SIGNIFICANT CHANGE UP (ref 0–1)
BILIRUB SERPL-MCNC: 0.9 MG/DL — SIGNIFICANT CHANGE UP (ref 0.2–1.2)
BILIRUB UR-MCNC: NEGATIVE — SIGNIFICANT CHANGE UP
BLD GP AB SCN SERPL QL: SIGNIFICANT CHANGE UP
BUN SERPL-MCNC: 22 MG/DL — HIGH (ref 10–20)
CALCIUM SERPL-MCNC: 8.8 MG/DL — SIGNIFICANT CHANGE UP (ref 8.5–10.1)
CHLORIDE SERPL-SCNC: 112 MMOL/L — HIGH (ref 98–110)
CO2 SERPL-SCNC: 22 MMOL/L — SIGNIFICANT CHANGE UP (ref 17–32)
COLOR SPEC: YELLOW — SIGNIFICANT CHANGE UP
CREAT SERPL-MCNC: 1 MG/DL — SIGNIFICANT CHANGE UP (ref 0.7–1.5)
DIFF PNL FLD: NEGATIVE — SIGNIFICANT CHANGE UP
EOSINOPHIL # BLD AUTO: 0.14 K/UL — SIGNIFICANT CHANGE UP (ref 0–0.7)
EOSINOPHIL NFR BLD AUTO: 2 % — SIGNIFICANT CHANGE UP (ref 0–8)
GLUCOSE SERPL-MCNC: 107 MG/DL — HIGH (ref 70–99)
GLUCOSE UR QL: NEGATIVE MG/DL — SIGNIFICANT CHANGE UP
HCT VFR BLD CALC: 29.2 % — LOW (ref 37–47)
HGB BLD-MCNC: 9.1 G/DL — LOW (ref 12–16)
IMM GRANULOCYTES NFR BLD AUTO: 0.1 % — SIGNIFICANT CHANGE UP (ref 0.1–0.3)
INR BLD: 1.04 RATIO — SIGNIFICANT CHANGE UP (ref 0.65–1.3)
KETONES UR-MCNC: NEGATIVE — SIGNIFICANT CHANGE UP
LEUKOCYTE ESTERASE UR-ACNC: NEGATIVE — SIGNIFICANT CHANGE UP
LYMPHOCYTES # BLD AUTO: 0.86 K/UL — LOW (ref 1.2–3.4)
LYMPHOCYTES # BLD AUTO: 12.6 % — LOW (ref 20.5–51.1)
MAGNESIUM SERPL-MCNC: 2 MG/DL — SIGNIFICANT CHANGE UP (ref 1.8–2.4)
MCHC RBC-ENTMCNC: 29.9 PG — SIGNIFICANT CHANGE UP (ref 27–31)
MCHC RBC-ENTMCNC: 31.2 G/DL — LOW (ref 32–37)
MCV RBC AUTO: 96.1 FL — SIGNIFICANT CHANGE UP (ref 81–99)
MONOCYTES # BLD AUTO: 0.55 K/UL — SIGNIFICANT CHANGE UP (ref 0.1–0.6)
MONOCYTES NFR BLD AUTO: 8 % — SIGNIFICANT CHANGE UP (ref 1.7–9.3)
NEUTROPHILS # BLD AUTO: 5.25 K/UL — SIGNIFICANT CHANGE UP (ref 1.4–6.5)
NEUTROPHILS NFR BLD AUTO: 76.9 % — HIGH (ref 42.2–75.2)
NITRITE UR-MCNC: NEGATIVE — SIGNIFICANT CHANGE UP
NRBC # BLD: 0 /100 WBCS — SIGNIFICANT CHANGE UP (ref 0–0)
PH UR: 5.5 — SIGNIFICANT CHANGE UP (ref 5–8)
PHOSPHATE SERPL-MCNC: 3.4 MG/DL — SIGNIFICANT CHANGE UP (ref 2.1–4.9)
PLATELET # BLD AUTO: 172 K/UL — SIGNIFICANT CHANGE UP (ref 130–400)
POTASSIUM SERPL-MCNC: 3.8 MMOL/L — SIGNIFICANT CHANGE UP (ref 3.5–5)
POTASSIUM SERPL-SCNC: 3.8 MMOL/L — SIGNIFICANT CHANGE UP (ref 3.5–5)
PROT SERPL-MCNC: 5.4 G/DL — LOW (ref 6–8)
PROT UR-MCNC: 30 MG/DL
PROTHROM AB SERPL-ACNC: 12 SEC — SIGNIFICANT CHANGE UP (ref 9.95–12.87)
RBC # BLD: 3.04 M/UL — LOW (ref 4.2–5.4)
RBC # FLD: 14.6 % — HIGH (ref 11.5–14.5)
SODIUM SERPL-SCNC: 145 MMOL/L — SIGNIFICANT CHANGE UP (ref 135–146)
SP GR SPEC: 1.02 — SIGNIFICANT CHANGE UP (ref 1.01–1.03)
TYPE + AB SCN PNL BLD: SIGNIFICANT CHANGE UP
URATE SERPL-MCNC: 4.5 MG/DL — SIGNIFICANT CHANGE UP (ref 2.5–7)
UROBILINOGEN FLD QL: 0.2 MG/DL — SIGNIFICANT CHANGE UP (ref 0.2–0.2)
WBC # BLD: 6.84 K/UL — SIGNIFICANT CHANGE UP (ref 4.8–10.8)
WBC # FLD AUTO: 6.84 K/UL — SIGNIFICANT CHANGE UP (ref 4.8–10.8)
WBC UR QL: SIGNIFICANT CHANGE UP /HPF

## 2019-03-17 RX ORDER — CHLORHEXIDINE GLUCONATE 213 G/1000ML
1 SOLUTION TOPICAL
Qty: 0 | Refills: 0 | Status: DISCONTINUED | OUTPATIENT
Start: 2019-03-17 | End: 2019-03-20

## 2019-03-17 RX ORDER — ENOXAPARIN SODIUM 100 MG/ML
40 INJECTION SUBCUTANEOUS EVERY 24 HOURS
Qty: 0 | Refills: 0 | Status: DISCONTINUED | OUTPATIENT
Start: 2019-03-17 | End: 2019-03-19

## 2019-03-17 RX ORDER — AMLODIPINE BESYLATE 2.5 MG/1
5 TABLET ORAL DAILY
Qty: 0 | Refills: 0 | Status: DISCONTINUED | OUTPATIENT
Start: 2019-03-17 | End: 2019-03-20

## 2019-03-17 RX ORDER — ACETAMINOPHEN 500 MG
650 TABLET ORAL EVERY 6 HOURS
Qty: 0 | Refills: 0 | Status: DISCONTINUED | OUTPATIENT
Start: 2019-03-17 | End: 2019-03-17

## 2019-03-17 RX ORDER — LIDOCAINE 4 G/100G
1 CREAM TOPICAL ONCE
Qty: 0 | Refills: 0 | Status: COMPLETED | OUTPATIENT
Start: 2019-03-17 | End: 2019-03-17

## 2019-03-17 RX ORDER — ANASTROZOLE 1 MG/1
1 TABLET ORAL DAILY
Qty: 0 | Refills: 0 | Status: DISCONTINUED | OUTPATIENT
Start: 2019-03-17 | End: 2019-03-20

## 2019-03-17 RX ORDER — OXYCODONE AND ACETAMINOPHEN 5; 325 MG/1; MG/1
1 TABLET ORAL EVERY 4 HOURS
Qty: 0 | Refills: 0 | Status: DISCONTINUED | OUTPATIENT
Start: 2019-03-17 | End: 2019-03-20

## 2019-03-17 RX ADMIN — ANASTROZOLE 1 MILLIGRAM(S): 1 TABLET ORAL at 11:45

## 2019-03-17 RX ADMIN — AMLODIPINE BESYLATE 5 MILLIGRAM(S): 2.5 TABLET ORAL at 06:49

## 2019-03-17 RX ADMIN — OXYCODONE AND ACETAMINOPHEN 1 TABLET(S): 5; 325 TABLET ORAL at 18:49

## 2019-03-17 RX ADMIN — LIDOCAINE 1 PATCH: 4 CREAM TOPICAL at 11:45

## 2019-03-17 RX ADMIN — ENOXAPARIN SODIUM 40 MILLIGRAM(S): 100 INJECTION SUBCUTANEOUS at 21:49

## 2019-03-17 RX ADMIN — LIDOCAINE 1 PATCH: 4 CREAM TOPICAL at 23:47

## 2019-03-17 RX ADMIN — Medication 1 TABLET(S): at 11:46

## 2019-03-17 RX ADMIN — LIDOCAINE 1 PATCH: 4 CREAM TOPICAL at 20:50

## 2019-03-17 NOTE — CONSULT NOTE ADULT - SUBJECTIVE AND OBJECTIVE BOX
Ortho Inpatient Consult  Shawanda Nagel    78F known to the orthopaedic service with severe L Hip OA, admitted for left hip pain and inability to ambulate. The patient states she has been having worsening pain for a few days, and she was unable to get up off the toilet seat yesterday. She was brought to the ED, and later admitted for her left hip pain. Patient scheduled for outpatient L SOHA in May. Currently, complaining of pain localized to her left hip and groin. Denies pain or injury to rest of extremity. Denies any recent falls.     PMHx + PSx:  HTN, Breast Cx s/p Mastectomy, R SOHA, B/L TKA    ALL:  NKDA    Social Hx:  Denies alcohol or tobacco use    PE :   LLE :  Healed anterior knee skin incision  Pain with log roll and axial loading at the hip  AROM hip limited  Thigh and calf soft and compressible, NTTP  EHL TA GS motor intact  SILT distally  Foot warm and perfused    Imaging reviewed: L Hip OA, end stage, no signs of fracture    A/P  78F with severe L Hip end stage OA:  - No acute ortho intervention at this time; will discuss with Dr. Hernández   - WBAT LLE  - PT for gait training  - Conservative treatment: NSAIDs if no contraindication, icing  - Medical management per primary team  - Ortho to continue following

## 2019-03-17 NOTE — H&P ADULT - ASSESSMENT
79 yo F with PMH of HTN, breast cancer s/p mastectomy currently on hormonal therapy, OA s/p b/l knee replacement presented to ED complaining of worsening L hip pain for past 2 weeks duration.    #) L hip pain 2/2 severe OA  - XR = severe L hip OA, no acute fracture  - pain control PRN  - patient scheduled for surgery on May 1 w/  [facility]  - will get get Ortho eval    #) HTN - c/w Norvasc    #) Breast ca s/p mastectomy - c/w Anastrazole    DVT ppx: Lovenox subQ  Diet: DASH  Activity: OOBTC  Code status: FULL  Dispo: pending 77 yo F with PMH of HTN, breast cancer s/p mastectomy currently on hormonal therapy, OA s/p b/l knee replacement presented to ED complaining of worsening L hip pain for past 2 weeks duration.    #) L hip pain 2/2 severe OA  - XR = severe L hip OA, no acute fracture  - pain control PRN  - patient scheduled for surgery on May 1 w/ Dr Hernández  - will get get Ortho + PT/Rehab eval    #) Anemia, normocytic - H/H ~ baseline, no evidence of bleeding, maintain active T&S    #) HTN - c/w Norvasc    #) Breast ca s/p mastectomy - c/w Anastrazole    DVT ppx: Lovenox subQ  Diet: DASH  Activity: OOBTC  Code status: FULL  Dispo: pending

## 2019-03-17 NOTE — H&P ADULT - NSHPLABSRESULTS_GEN_ALL_CORE
10.2   11.13 )-----------( 203      ( 16 Mar 2019 21:19 )             33.4     03-16    146  |  108  |  28<H>  ----------------------------<  114<H>  4.1   |  23  |  1.2    Ca    9.4      16 Mar 2019 21:19    TPro  6.4  /  Alb  3.8  /  TBili  0.5  /  DBili  x   /  AST  17  /  ALT  9   /  AlkPhos  108  03-16        Urinalysis Basic - ( 17 Mar 2019 00:00 )    Color: Yellow / Appearance: Clear / S.020 / pH: x  Gluc: x / Ketone: Negative  / Bili: Negative / Urobili: 0.2 mg/dL   Blood: x / Protein: 30 mg/dL / Nitrite: Negative   Leuk Esterase: Negative / RBC: x / WBC 1-2 /HPF   Sq Epi: x / Non Sq Epi: x / Bacteria: x 10.2   11.13 )-----------( 203      ( 16 Mar 2019 21:19 )               33.4     03-16    146  |  108  |  28<H>  ----------------------------<  114<H>  4.1   |  23  |  1.2    Ca    9.4      16 Mar 2019 21:19    TPro  6.4  /  Alb  3.8  /  TBili  0.5  /  DBili  x   /  AST  17  /  ALT  9   /  AlkPhos  108  03-16        Urinalysis Basic - ( 17 Mar 2019 00:00 )    Color: Yellow / Appearance: Clear / S.020 / pH: x  Gluc: x / Ketone: Negative  / Bili: Negative / Urobili: 0.2 mg/dL   Blood: x / Protein: 30 mg/dL / Nitrite: Negative   Leuk Esterase: Negative / RBC: x / WBC 1-2 /HPF   Sq Epi: x / Non Sq Epi: x / Bacteria: x

## 2019-03-17 NOTE — H&P ADULT - NSHPPHYSICALEXAM_GEN_ALL_CORE
GEN: NAD, comfortable  CARDIO: RRR, no m/r/g  RESP: CTAB, no w/r/r  ABD: soft, NT/ND, +BS  EXT: no edema, pp b/l  NEURO: AAOx3, grossly normal GEN: NAD, comfortable  CARDIO: RRR, no m/r/g  RESP: CTAB, no w/r/r  ABD: soft, NT/ND, +BS  EXT: +2 pitting edema b/l LE  NEURO: AAOx3, grossly normal

## 2019-03-17 NOTE — H&P ADULT - HISTORY OF PRESENT ILLNESS
77 yo F with PMH of HTN, breast cancer s/p mastectomy currently on hormonal therapy, OA s/p b/l knee replacement presented to ED complaining of worsening L hip pain for past 2 weeks duration. Patient is scheduled to undergo elective hip replacement on May 1 with Dr Hernández. Came to ED because pain became unbearable and patient was unable to ambulate.    Of note, patient was admitted in Dec for intractable knee pain, was discharged to [facility] for rehab. 79 yo F with PMH of HTN, breast cancer s/p mastectomy currently on hormonal therapy, OA s/p b/l knee replacement presented to ED complaining of worsening L hip pain for past 2 weeks duration. Patient is scheduled to undergo elective hip replacement on May 1 with Dr Hernández. Came to ED because pain became unbearable and patient had difficulty ambulating. Patient reports that day PTP she was going to sit on her commode and landed hard onto it (essentially falling from small height). Reports worsening pain afterwards. Denies any pain elsewhere. Denies any other symptoms - no fevers, chills, SOB, chest pain, syncope, collapse, falls onto floor. At baseline patient is ambulatory with walker.    Of note, patient was admitted in Dec for intractable knee pain, was discharged to [facility] for rehab.

## 2019-03-17 NOTE — CONSULT NOTE ADULT - ATTENDING COMMENTS
pt s&e  scheduled for surgery on 5/1  will discuss dispo with medical team and see if pt need any sooner intervention.

## 2019-03-17 NOTE — H&P ADULT - NSHPSOCIALHISTORY_GEN_ALL_CORE
Denies alcohol, tobacco, or illicit drug use Denies alcohol, tobacco, or illicit drug use  Ambulatory with walker  Lives at home with daughter

## 2019-03-17 NOTE — H&P ADULT - ATTENDING COMMENTS
Pt was seen and examined at bedside independently, she is c/o left hip pain and inability to ambulate, pt has h/o severe arthritis, s/p b/l knee replacement, she is scheduled for left hip replacement on May 1st. Recently she was in rehab and came back home 6 weeks ago. This time was admitted after she was not able to lift herself up from sitting position and was not able to ambulate. Her left Hip X-ray was negative for Fx, consistent with severe arthritis. For now will start pain meds PRN, Lidoderm topical and call PT for evaluation.   I agree with residents findings, assessment and plan above with few corrections documented in my note.     Vital Signs Last 24 Hrs  T(C): 36.7 (17 Mar 2019 00:50), Max: 37.4 (16 Mar 2019 19:35)  T(F): 98 (17 Mar 2019 00:50), Max: 99.4 (16 Mar 2019 19:35)  HR: 88 (17 Mar 2019 00:50) (88 - 95)  BP: 122/68 (17 Mar 2019 00:50) (122/68 - 129/69)  BP(mean): --  RR: 18 (17 Mar 2019 00:50) (18 - 18)    PHYSICAL EXAM:  GEN: NAD, comfortable  CARDIO: RRR, no m/r/g  RESP: CTAB, no w/r/r  ABD: soft, NT/ND, +BS  EXT: +2 pitting edema b/l LE, gross deformity noted in right elbow joint with significant swelling , joint deformities in both wrists   NEURO: AAOx3, no focal neurologic deficit   Skin: no rash, ecchymose around right elbow     LABS:                        9.1    6.84  )-----------( 172      ( 17 Mar 2019 07:42 )             29.2   03-17    145  |  112<H>  |  22<H>  ----------------------------<  107<H>  3.8   |  22  |  1.0    Ca    8.8      17 Mar 2019 07:42  Phos  3.4     03-17  Mg     2.0     03-17    TPro  5.4<L>  /  Alb  3.2<L>  /  TBili  0.9  /  DBili  x   /  AST  14  /  ALT  7   /  AlkPhos  86  03-17  RADIOLOGY:  < from: Xray Hip 2-3 Views, Left (03.16.19 @ 21:29) >    IMPRESSION:    No radiographic evidence of acute osseous abnormality.    Severe left hip osteoarthritis.    A/P  # Severe left hip arthritis  - start Lidoderm topical   - Percocet Q 4h PRN  - PT/Rehab evaluation fof SNF  - pt has scheduled hip replacement on May 1st  - fall precautions, OOB to chair today  - pt needs rheumatology follow up to r/o RA ( she has severe arthritis with joints deformities and sings of inflammation)  - get an X-ray of right elbow joint ( grossly deformed with soft tissue swelling around), warm compress to right joint Q 4 hours   - check uric acid level     # HTN  -DASH diet  - c/w Amlodipine     # Anemia  - no active  bleeding noted   - send anemia work up  - monitor H/H  - start MVI    # h/o Breast CA  - c/w Anastrazole     # GI/DVT prophylaxis   # Dispo: anticipate discharge in 24 hours     #Progress Note Handoff  Pending (specify):  Consults____PT/Rehab _____, Tests________, Test Results_______, Other_________  Family discussion: I spoke with pt, she is AAOx3, no family was available by the ifeoma   Disposition: Home___/SNF__x_/Other________/Unknown at this time________

## 2019-03-18 LAB
ALBUMIN SERPL ELPH-MCNC: 3.3 G/DL — LOW (ref 3.5–5.2)
ALP SERPL-CCNC: 87 U/L — SIGNIFICANT CHANGE UP (ref 30–115)
ALT FLD-CCNC: 7 U/L — SIGNIFICANT CHANGE UP (ref 0–41)
ANION GAP SERPL CALC-SCNC: 12 MMOL/L — SIGNIFICANT CHANGE UP (ref 7–14)
AST SERPL-CCNC: 13 U/L — SIGNIFICANT CHANGE UP (ref 0–41)
BILIRUB SERPL-MCNC: 0.6 MG/DL — SIGNIFICANT CHANGE UP (ref 0.2–1.2)
BUN SERPL-MCNC: 22 MG/DL — HIGH (ref 10–20)
CALCIUM SERPL-MCNC: 8.9 MG/DL — SIGNIFICANT CHANGE UP (ref 8.5–10.1)
CHLORIDE SERPL-SCNC: 111 MMOL/L — HIGH (ref 98–110)
CO2 SERPL-SCNC: 22 MMOL/L — SIGNIFICANT CHANGE UP (ref 17–32)
CREAT SERPL-MCNC: 1 MG/DL — SIGNIFICANT CHANGE UP (ref 0.7–1.5)
FOLATE SERPL-MCNC: >20 NG/ML — SIGNIFICANT CHANGE UP
GLUCOSE SERPL-MCNC: 115 MG/DL — HIGH (ref 70–99)
HAPTOGLOB SERPL-MCNC: 262 MG/DL — HIGH (ref 34–200)
HCT VFR BLD CALC: 31 % — LOW (ref 37–47)
HGB BLD-MCNC: 9.7 G/DL — LOW (ref 12–16)
LDH SERPL L TO P-CCNC: 219 — SIGNIFICANT CHANGE UP (ref 50–242)
MCHC RBC-ENTMCNC: 29.9 PG — SIGNIFICANT CHANGE UP (ref 27–31)
MCHC RBC-ENTMCNC: 31.3 G/DL — LOW (ref 32–37)
MCV RBC AUTO: 95.7 FL — SIGNIFICANT CHANGE UP (ref 81–99)
NRBC # BLD: 0 /100 WBCS — SIGNIFICANT CHANGE UP (ref 0–0)
PLATELET # BLD AUTO: 175 K/UL — SIGNIFICANT CHANGE UP (ref 130–400)
POTASSIUM SERPL-MCNC: 3.8 MMOL/L — SIGNIFICANT CHANGE UP (ref 3.5–5)
POTASSIUM SERPL-SCNC: 3.8 MMOL/L — SIGNIFICANT CHANGE UP (ref 3.5–5)
PROT SERPL-MCNC: 5.6 G/DL — LOW (ref 6–8)
RBC # BLD: 3.24 M/UL — LOW (ref 4.2–5.4)
RBC # BLD: 3.24 M/UL — LOW (ref 4.2–5.4)
RBC # FLD: 14.5 % — SIGNIFICANT CHANGE UP (ref 11.5–14.5)
RETICS #: 48.3 K/UL — SIGNIFICANT CHANGE UP (ref 25–125)
RETICS/RBC NFR: 1.5 % — SIGNIFICANT CHANGE UP (ref 0.5–1.5)
SODIUM SERPL-SCNC: 145 MMOL/L — SIGNIFICANT CHANGE UP (ref 135–146)
VIT B12 SERPL-MCNC: 299 PG/ML — SIGNIFICANT CHANGE UP (ref 232–1245)
WBC # BLD: 7.69 K/UL — SIGNIFICANT CHANGE UP (ref 4.8–10.8)
WBC # FLD AUTO: 7.69 K/UL — SIGNIFICANT CHANGE UP (ref 4.8–10.8)

## 2019-03-18 RX ORDER — SENNA PLUS 8.6 MG/1
2 TABLET ORAL AT BEDTIME
Qty: 0 | Refills: 0 | Status: DISCONTINUED | OUTPATIENT
Start: 2019-03-18 | End: 2019-03-20

## 2019-03-18 RX ORDER — DOCUSATE SODIUM 100 MG
100 CAPSULE ORAL THREE TIMES A DAY
Qty: 0 | Refills: 0 | Status: DISCONTINUED | OUTPATIENT
Start: 2019-03-18 | End: 2019-03-20

## 2019-03-18 RX ADMIN — Medication 1 TABLET(S): at 11:21

## 2019-03-18 RX ADMIN — OXYCODONE AND ACETAMINOPHEN 1 TABLET(S): 5; 325 TABLET ORAL at 06:50

## 2019-03-18 RX ADMIN — OXYCODONE AND ACETAMINOPHEN 1 TABLET(S): 5; 325 TABLET ORAL at 14:32

## 2019-03-18 RX ADMIN — CHLORHEXIDINE GLUCONATE 1 APPLICATION(S): 213 SOLUTION TOPICAL at 05:37

## 2019-03-18 RX ADMIN — ANASTROZOLE 1 MILLIGRAM(S): 1 TABLET ORAL at 11:21

## 2019-03-18 RX ADMIN — OXYCODONE AND ACETAMINOPHEN 1 TABLET(S): 5; 325 TABLET ORAL at 13:34

## 2019-03-18 RX ADMIN — Medication 100 MILLIGRAM(S): at 21:39

## 2019-03-18 RX ADMIN — SENNA PLUS 2 TABLET(S): 8.6 TABLET ORAL at 21:39

## 2019-03-18 RX ADMIN — ENOXAPARIN SODIUM 40 MILLIGRAM(S): 100 INJECTION SUBCUTANEOUS at 21:39

## 2019-03-18 RX ADMIN — OXYCODONE AND ACETAMINOPHEN 1 TABLET(S): 5; 325 TABLET ORAL at 06:18

## 2019-03-18 RX ADMIN — AMLODIPINE BESYLATE 5 MILLIGRAM(S): 2.5 TABLET ORAL at 05:37

## 2019-03-18 NOTE — PROGRESS NOTE ADULT - SUBJECTIVE AND OBJECTIVE BOX
BHAVIN CHATMAN  78y  Female  ***My note supersedes ALL resident notes that I sign.  My corrections for their notes are in my note.***    I can be reached directly on [id number]. My office number is [phone]. My personal cell number is [phone].    INTERVAL EVENTS: Here for f/u of pain. Pt had hard time sitting in chair for long. Only tolerated about 30 min and needed to go back to bed. Pt is not walking, but can stand and pivot. Pt is already s/p b/l knee replacements and was planning on left hip replacement, but seems like she will not be able to make to  and is requesting OR date be moved up to take care of hip for pain control. Pt has rt elbow swelling (prob olecranon bursitis) since 2018. Did well w/ PT of arm, which ended 2017. Pt leans on her elbows a lot. The initial problem w/ her elbow occurred after a fall on to concrete. Pt has no breathing issues. Breast Ca is in remission as far as she knows.    T(F): 97.4 (19 @ 04:55), Max: 98.7 (19 @ 15:14)  HR: 77 (19 @ 04:55) (71 - 91)  BP: 129/79 (18-19 @ 04:55) (129/66 - 130/62)  RR: 20 (1819 @ 04:55) (18 - 20)  SpO2: --    GENERAL: anxious a little, in pain in left hip, kristal w/ mvmt; a+o x3  HEENT: AT; PERRL, EOMI; mouth clr, nose clr  NECK: no jvd, no nodes  CHEST/LUNG: cta b/l  HEART: rrr no murmur  ABDOMEN: soft nt nd no HS radha  EXTREMITIES: b/l knee scars; + pain w/ mvmt of lt hip; severe OA noticed in hands in most knuckles, including DIPs; no tophi; no evid of RA; + marked swelling of rt elbow - feels doughy and soft; + tender; no evid of infection; + marked atrophy of hand muscles  NEUROLOGY: CN intact; can move all 4 ext, but lt leg limited a lot by pain  SKIN: no rash; no psoriasis    LABS:                        9.7     (    95.7   7.69  )-----------( ---------      175      ( 18 Mar 2019 07:27 )             31.0    (    14.5     145   (   111   (   115      19 @ 07:27  ----------------------               3.8   (   22   (   22                             -----                        1.0  Ca  8.9   Mg  --    P   --     LFT  5.6  (  0.6  (  13       19 @ 07:27  -------------------------  3.3  (  87  (  7    PT/INR - ( 17 Mar 2019 07:42 )   PT: 12.00 sec;   INR: 1.04 ratio    PTT - ( 17 Mar 2019 07:42 )  PTT:26.5 sec    Urinalysis Basic - ( 17 Mar 2019 00:00 )    Color: Yellow / Appearance: Clear / S.020 / pH: x  Gluc: x / Ketone: Negative  / Bili: Negative / Urobili: 0.2 mg/dL   Blood: x / Protein: 30 mg/dL / Nitrite: Negative   Leuk Esterase: Negative / RBC: x / WBC 1-2 /HPF   Sq Epi: x / Non Sq Epi: x / Bacteria: x    RADIOLOGY & ADDITIONAL TESTS:  < from: Xray Hip 2-3 Views, Left (19 @ 21:29) >  FINDINGS:  Bones are osteopenic.    Right total hip arthroplasty.    Severe left hip degenerative change with bone on bone apposition.   Lumbosacral spine degenerative changes.    No evidence of acute fracture or dislocation.    IMPRESSION:    No radiographic evidence of acute osseous abnormality.    Severe left hip osteoarthritis.    < end of copied text >    < from: Xray Knee 3 Views, Left (18 @ 18:56) >  Impression:    Status post a left total knee arthroplasty.    No acute fractures or areas of osteolysis/loosening.    < end of copied text >      MEDICATIONS:    amLODIPine   Tablet 5 milliGRAM(s) Oral daily  anastrozole 1 milliGRAM(s) Oral daily  chlorhexidine 4% Liquid 1 Application(s) Topical <User Schedule>  enoxaparin Injectable 40 milliGRAM(s) SubCutaneous every 24 hours  multivitamin 1 Tablet(s) Oral daily  oxyCODONE    5 mG/acetaminophen 325 mG 1 Tablet(s) Oral every 4 hours PRN

## 2019-03-18 NOTE — PHYSICAL THERAPY INITIAL EVALUATION ADULT - GENERAL OBSERVATIONS, REHAB EVAL
13:25-13:35 Pt encountered semifowler in bed in NAD. Pt requesting pain meds prior to tx, currently 5/10 L hip in bed. Kendra Barreto aware, to provide pain meds then will f/u with PT when pain is better controlled.  Pt educated on goals for PT, pt in agreement.
14:27-15:09 pt encountered sleeping in bed in NAD. Agreeable for PT, pt reports improvement in L hip pain.

## 2019-03-18 NOTE — CONSULT NOTE ADULT - ASSESSMENT
IMPRESSION: Rehab of left hip oa    PRECAUTIONS: [  ] Cardiac  [  ] Respiratory  [  ] Seizures [  ] Contact Isolation  [  ] Droplet Isolation  [  ] Other    Weight Bearing Status:     RECOMMENDATION:    Out of Bed to Chair     DVT/Decubiti Prophylaxis    REHAB PLAN:     [ x  ] Bedside P/T 3-5 times a week   [   ]   Bedside O/T  2-3 times a week             [   ] No Rehab Therapy Indicated                   [   ]  Speech Therapy   Conditioning/ROM                                    ADL  Bed Mobility                                               Conditioning/ROM  Transfers                                                     Bed Mobility  Sitting /Standing Balance                         Transfers                                        Gait Training                                               Sitting/Standing Balance  Stair Training [   ]Applicable                    Home equipment Eval                                                                        Splinting  [   ] Only      GOALS:   ADL   [   ]   Independent                    Transfers  [ x  ] Independent                          Ambulation  [ x  ] Independent     [  x  ] With device                            [   ]  CG                                                         [   ]  CG                                                                  [   ] CG                            [    ] Min A                                                   [   ] Min A                                                              [   ] Min  A          DISCHARGE PLAN:   [   ]  Good candidate for Intensive Rehabilitation/Hospital based                                             Will tolerate 3hrs Intensive Rehab Daily                                       [  x  ]  Short Term Rehab in Skilled Nursing Facility                       vs                [ x   ]  Home with Outpatient or VN services                                         [    ]  Possible Candidate for Intensive Hospital based Rehab

## 2019-03-18 NOTE — PROGRESS NOTE ADULT - ASSESSMENT
# L hip pain 2/2 severe OA - no evid of other rheum dz (ESR 45 is nl for her age and sex and CRP is low); also prior sx on rt hip and knees showed no other pathology  XR = severe L hip OA, no acute fracture  patient should be scheduled for surgery on this admission w/ Dr Hernández; I would NOT delay until 5/1/19  PT/Rehab eval  pain control w/ oral meds  pt is good surgical candidate - obtain CXR for pre-op eval  EKG reviewed - NSR w/ PACs; lots of baseline artifact yielding NS ST/T changes; no path q's    # Anemia, normocytic (borderline high MCV)  check TSH, B12, Fol, Ferritin, Retic  prob An of Chr Dz    # HTN - c/w Norvasc    # Breast ca s/p mastectomy - in remission  c/w Anastrazole - can cont through sx    # DVT ppx: Lovenox subQ    # Activity: OOBTC    # Code status: FULL    Dispo: f/u w/ ortho for elective Left THR (pt has already had b/l TKR and rt THR); pain control # L hip pain 2/2 severe OA - no evid of other rheum dz (ESR 45 is nl for her age and sex and CRP is low); also prior sx on rt hip and knees showed no other pathology  XR = severe L hip OA, no acute fracture  patient should be scheduled for surgery on this admission w/ Dr Hernández; I would NOT delay until 5/1/19  PT/Rehab eval  pain control w/ oral meds  pt is good surgical candidate - obtain CXR for pre-op eval  EKG reviewed - NSR w/ PACs; lots of baseline artifact yielding NS ST/T changes; no path q's    # olecranon bursitis rt elbow  check rt elbow x-ray  consider NSAIDs  no abx needed  f/u w/ ortho    # 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; might have carpal tunnel, as well  would explain hand muscle atrophy  outpt EMG/NCS of b/l upper ext and if + could consider hand sx eval and wrist splints  protect elbows, kristal rt - use pillow under forearms while seated in chair to raise elbows off armrests (so pt is not leaning on ulnar nerves)    # Anemia, normocytic (borderline high MCV)  check TSH, B12, Fol, Ferritin, Retic  prob An of Chr Dz    # HTN - c/w Norvasc    # Breast ca s/p mastectomy - in remission  c/w Anastrazole - can cont through sx    # DVT ppx: Lovenox subQ    # Activity: OOBTC    # Code status: FULL    Dispo: f/u w/ ortho for elective Left THR (pt has already had b/l TKR and rt THR); pain control

## 2019-03-18 NOTE — PHYSICAL THERAPY INITIAL EVALUATION ADULT - GAIT DISTANCE, PT EVAL
multiple attempts made to stand, pt unable to maintain full  upright posture, very flexed forward and pushing self back into chair, PATIENT VERY NERVOUS, HAS DIFFICULTY WITH SEQUENCING, stood 5 seconds x 5 attempts , unable to weight shift

## 2019-03-18 NOTE — PROGRESS NOTE ADULT - ASSESSMENT
77 yo F PMH HTN, breast cancer (3.5 yr ago s/p R mastectomy, on anaztrozol), OA s/p b/l knee replacement p/w worsening L hip pain for 2 weeks. XR showed severe L hip OA, no acute fx.    # L hip pain 2/2 severe OA - no evid of other rheum dz (ESR 45 is nl for her age and sex and CRP is low); also prior sx on rt hip and knees showed no other pathology  XR = severe L hip OA, no acute fracture  patient should be scheduled for surgery on this admission w/ Dr Hernández; would NOT delay until 5/1/19- ortho notified  PT saw pt  Physiatry- SNF or home with VN  pain control w/ percocet 5 q4h prn  pt is good surgical candidate - CXR for pre-op eval performed/ f/u results  EKG reviewed - NSR w/ PACs; lots of baseline artifact yielding NS ST/T changes; no path q's    # olecranon bursitis rt elbow  R elbow x-ray- large olecranon bursitis 2/2 infectious/inflam. Lab correlate  consider NSAIDs  no abx needed  f/u w/ ortho    # 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; might have carpal tunnel, as well  would explain hand muscle atrophy  outpt EMG/NCS of b/l upper ext and if + could consider hand sx eval and wrist splints  protect elbows, kristal rt - use pillow under forearms while seated in chair to raise elbows off armrests (so pt is not leaning on ulnar nerves)    # Anemia, normocytic (borderline high MCV)  ordered TSH, B12, Fol, Ferritin, Retic  prob An of Chr Dz    # HTN - c/w Norvasc    # Breast ca s/p R mastectomy - in remission  c/w Anastrazole - can cont through sx    #constipation- start senna and colace    # DVT ppx: Lovenox subQ    # Activity: OOBTC    # Code status: FULL  From home, wish to go to [facility]  Dispo: f/u w/ ortho for elective Left THR (pt has already had b/l TKR and rt THR); pain control

## 2019-03-18 NOTE — PROGRESS NOTE ADULT - SUBJECTIVE AND OBJECTIVE BOX
SUBJECTIVE:    Patient is a 78y old Female who presents with a chief complaint of hip pain (18 Mar 2019 12:41)    Currently admitted to medicine with the primary diagnosis of Hip pain     Today is hospital day 2d. Overnight no event. Report L hip pain at rest and worse with movement. Pt does not want to wait until May 1 for L hip replacement. Denied CP, SOB, abd pain, dysuria. Last stool Sat.    PAST MEDICAL & SURGICAL HISTORY  HTN (hypertension)  Breast CA: S/P surgery and on hormone therapy  Status post knee replacement: B/L  H/O mastectomy, right        ALLERGIES:  No Known Allergies    MEDICATIONS:  STANDING MEDICATIONS  amLODIPine   Tablet 5 milliGRAM(s) Oral daily  anastrozole 1 milliGRAM(s) Oral daily  chlorhexidine 4% Liquid 1 Application(s) Topical <User Schedule>  docusate sodium 100 milliGRAM(s) Oral three times a day  enoxaparin Injectable 40 milliGRAM(s) SubCutaneous every 24 hours  multivitamin 1 Tablet(s) Oral daily  senna 2 Tablet(s) Oral at bedtime    PRN MEDICATIONS  oxyCODONE    5 mG/acetaminophen 325 mG 1 Tablet(s) Oral every 4 hours PRN    Home med  AMLODIPINE BESYLATE 5 MG TABS:  (22 Dec 2018 23:13)  anastrozole 1 mg oral tablet: 1 tab(s) orally once a day (22 Dec 2018 23:13)        VITALS:   T(F): 97.9  HR: 63  BP: 119/56  RR: 18  SpO2: --    LABS:                        9.7    7.69  )-----------( 175      ( 18 Mar 2019 07:27 )             31.0     03-18    145  |  111<H>  |  22<H>  ----------------------------<  115<H>  3.8   |  22  |  1.0    Ca    8.9      18 Mar 2019 07:27  Phos  3.4     03-17  Mg     2.0     03-17    TPro  5.6<L>  /  Alb  3.3<L>  /  TBili  0.6  /  DBili  x   /  AST  13  /  ALT  7   /  AlkPhos  87  03-18    PT/INR - ( 17 Mar 2019 07:42 )   PT: 12.00 sec;   INR: 1.04 ratio         PTT - ( 17 Mar 2019 07:42 )  PTT:26.5 sec  Urinalysis Basic - ( 17 Mar 2019 00:00 )    Color: Yellow / Appearance: Clear / S.020 / pH: x  Gluc: x / Ketone: Negative  / Bili: Negative / Urobili: 0.2 mg/dL   Blood: x / Protein: 30 mg/dL / Nitrite: Negative   Leuk Esterase: Negative / RBC: x / WBC 1-2 /HPF   Sq Epi: x / Non Sq Epi: x / Bacteria: x                RADIOLOGY:    < from: Xray Elbow AP + Lateral, Right (19 @ 12:33) >  impression: Elbow deformity present with supracondylar distal humeral   fracture deformities with resorption of the condyles,   deformities/resorption of the radial head and proximal ulna. There is   bony erosion and fragmentation associated with large olecranon bursitis.   Differential considerations for this appearance are numerous and include   infectious/inflammatory etiologies    Large olecranon bursitis is nonspecific and may be secondary to   infectious/inflammatory etiologies. Recommend laboratory correlation    < end of copied text >    < from: Xray Hip 2-3 Views, Left (19 @ 21:29) >  FINDINGS:  Bones are osteopenic.    Right total hip arthroplasty.    Severe left hip degenerative change with bone on bone apposition.   Lumbosacral spine degenerative changes.    No evidence of acute fracture or dislocation.    IMPRESSION:    No radiographic evidence of acute osseous abnormality.    Severe left hip osteoarthritis.    < end of copied text >      PHYSICAL EXAM:  GENERAL: pain in left hip, kristal w/ mvt; a+o x3  HEENT: AT; PERRL, EOMI; mouth clr, nose clr  NECK: no jvd, no nodes  CHEST/LUNG: cta b/l  HEART: rrr no murmur  ABDOMEN: soft nt nd no HS radha  EXTREMITIES: b/l knee scars; + pain w/ mvmt of lt hip; severe OA noticed in hands in most knuckles, including DIPs; no tophi; no evid of RA; + marked swelling of rt elbow - feels doughy and soft; + tender; no evid of infection; + marked atrophy of hand muscles  NEUROLOGY: CN intact; can move all 4 ext, but lt leg limited a lot by pain  SKIN: no rash; no psoriasis

## 2019-03-18 NOTE — CONSULT NOTE ADULT - SUBJECTIVE AND OBJECTIVE BOX
HPI:  79 yo F with PMH of HTN, breast cancer s/p mastectomy currently on hormonal therapy, OA s/p b/l knee replacement presented to ED complaining of worsening L hip pain for past 2 weeks duration. Patient is scheduled to undergo elective hip replacement on May 1 with Dr Hernández. Came to ED because pain became unbearable and patient had difficulty ambulating. Patient reports that day PTP she was going to sit on her commode and landed hard onto it (essentially falling from small height). Reports worsening pain afterwards. Denies any pain elsewhere. Denies any other symptoms - no fevers, chills, SOB, chest pain, syncope, collapse, falls onto floor. At baseline patient is ambulatory with walker.    Of note, patient was admitted in Dec for intractable knee pain, was discharged to [facility] for rehab. (17 Mar 2019 01:18)      PAST MEDICAL & SURGICAL HISTORY:  HTN (hypertension)  Breast CA: S/P surgery and on hormone therapy  Status post knee replacement: B/L  H/O mastectomy, right      Hospital Course:    TODAY'S SUBJECTIVE & REVIEW OF SYMPTOMS:     Constitutional WNL   Cardio WNL   Resp WNL   GI WNL  Heme WNL  Endo WNL  Skin WNL  MSK left hip pain  Neuro WNL  Cognitive WNL  Psych WNL      MEDICATIONS  (STANDING):  amLODIPine   Tablet 5 milliGRAM(s) Oral daily  anastrozole 1 milliGRAM(s) Oral daily  chlorhexidine 4% Liquid 1 Application(s) Topical <User Schedule>  enoxaparin Injectable 40 milliGRAM(s) SubCutaneous every 24 hours  multivitamin 1 Tablet(s) Oral daily    MEDICATIONS  (PRN):  oxyCODONE    5 mG/acetaminophen 325 mG 1 Tablet(s) Oral every 4 hours PRN Moderate Pain (4 - 6)      FAMILY HISTORY:      Allergies    No Known Allergies    Intolerances        SOCIAL HISTORY:    [  ] Etoh  [  ] Smoking  [  ] Substance abuse     Home Environment:  [  ] Home Alone  [xx  ] Lives with Family  [  ] Home Health Aid    Dwelling:  [  ] Apartment  [x  ] Private House  [  ] Adult Home  [  ] Skilled Nursing Facility      [  ] Short Term  [  ] Long Term  [ x ] Stairs       Elevator [  ]    FUNCTIONAL STATUS PTA: (Check all that apply)  Ambulation: [x   ]Independent    [  ] Dependent     [  ] Non-Ambulatory  Assistive Device: [ x ] SA Cane  [  ]  Q Cane  [  ] Walker  [  ]  Wheelchair  ADL : [  ] Independent  [  ]  Dependent       Vital Signs Last 24 Hrs  T(C): 36.3 (18 Mar 2019 04:55), Max: 37.1 (17 Mar 2019 15:14)  T(F): 97.4 (18 Mar 2019 04:55), Max: 98.7 (17 Mar 2019 15:14)  HR: 77 (18 Mar 2019 04:55) (71 - 91)  BP: 129/79 (18 Mar 2019 04:55) (129/66 - 130/62)  BP(mean): --  RR: 20 (18 Mar 2019 04:55) (18 - 20)  SpO2: --      PHYSICAL EXAM: Alert & Oriented X3  GENERAL: NAD, well-groomed, well-developed  HEAD:  Atraumatic, Normocephalic  CHEST/LUNG: Clear   HEART: S1S2+  ABDOMEN: Soft, Nontender  EXTREMITIES:  no calf tenderness    NERVOUS SYSTEM:  Cranial Nerves 2-12 intact [  ] Abnormal  [  ]  ROM: WFL all extremities [  ]  Abnormal [ x ]limited  lle  Motor Strength: WFL all extremities  [  ]  Abnormal [x  ]limited lle  Sensation: intact to light touch [x  ] Abnormal [  ]  Reflexes: Symmetric [  ]  Abnormal [  ]    FUNCTIONAL STATUS:  Bed Mobility: Independent [  ]  Supervision [  ]  Needs Assistance [ x ]  N/A [  ]  Transfers: Independent [  ]  Supervision [  ]  Needs Assistance [ x ]  N/A [  ]   Ambulation: Independent [  ]  Supervision [  ]  Needs Assistance [  ]  N/A [  ]  ADL: Independent [  ] Requires Assistance [  ] N/A [  ]      LABS:                        9.7    7.69  )-----------( 175      ( 18 Mar 2019 07:27 )             31.0     03-18    145  |  111<H>  |  22<H>  ----------------------------<  115<H>  3.8   |  22  |  1.0    Ca    8.9      18 Mar 2019 07:27  Phos  3.4     03-17  Mg     2.0     03-17    TPro  5.6<L>  /  Alb  3.3<L>  /  TBili  0.6  /  DBili  x   /  AST  13  /  ALT  7   /  AlkPhos  87  03-18    PT/INR - ( 17 Mar 2019 07:42 )   PT: 12.00 sec;   INR: 1.04 ratio         PTT - ( 17 Mar 2019 07:42 )  PTT:26.5 sec  Urinalysis Basic - ( 17 Mar 2019 00:00 )    Color: Yellow / Appearance: Clear / S.020 / pH: x  Gluc: x / Ketone: Negative  / Bili: Negative / Urobili: 0.2 mg/dL   Blood: x / Protein: 30 mg/dL / Nitrite: Negative   Leuk Esterase: Negative / RBC: x / WBC 1-2 /HPF   Sq Epi: x / Non Sq Epi: x / Bacteria: x        RADIOLOGY & ADDITIONAL STUDIES:    Assesment:

## 2019-03-18 NOTE — PHYSICAL THERAPY INITIAL EVALUATION ADULT - MANUAL MUSCLE TESTING RESULTS, REHAB EVAL
R hip 3-/5, R knee 4/5, R ankle 3+/5 in range available, L hip 2-/5,  L knee extension 4/5, L ankle 3+/5 in availiable ROM

## 2019-03-19 LAB
ALLERGY+IMMUNOLOGY DIAG STUDY NOTE: SIGNIFICANT CHANGE UP
FOLATE SERPL-MCNC: >20 NG/ML — SIGNIFICANT CHANGE UP
HCT VFR BLD CALC: 29.7 % — LOW (ref 37–47)
HGB BLD-MCNC: 9.3 G/DL — LOW (ref 12–16)
IRON SATN MFR SERPL: 14 % — LOW (ref 15–50)
IRON SATN MFR SERPL: 26 UG/DL — LOW (ref 35–150)
MCHC RBC-ENTMCNC: 29.8 PG — SIGNIFICANT CHANGE UP (ref 27–31)
MCHC RBC-ENTMCNC: 31.3 G/DL — LOW (ref 32–37)
MCV RBC AUTO: 95.2 FL — SIGNIFICANT CHANGE UP (ref 81–99)
NRBC # BLD: 0 /100 WBCS — SIGNIFICANT CHANGE UP (ref 0–0)
PLATELET # BLD AUTO: 162 K/UL — SIGNIFICANT CHANGE UP (ref 130–400)
RBC # BLD: 3.12 M/UL — LOW (ref 4.2–5.4)
RBC # BLD: 3.12 M/UL — LOW (ref 4.2–5.4)
RBC # FLD: 14 % — SIGNIFICANT CHANGE UP (ref 11.5–14.5)
RETICS #: 42.4 K/UL — SIGNIFICANT CHANGE UP (ref 25–125)
RETICS/RBC NFR: 1.4 % — SIGNIFICANT CHANGE UP (ref 0.5–1.5)
TIBC SERPL-MCNC: 183 UG/DL — LOW (ref 220–430)
TSH SERPL-MCNC: 1.9 UIU/ML — SIGNIFICANT CHANGE UP (ref 0.27–4.2)
TYPE + AB SCN PNL BLD: SIGNIFICANT CHANGE UP
UIBC SERPL-MCNC: 157 UG/DL — SIGNIFICANT CHANGE UP (ref 110–370)
VIT B12 SERPL-MCNC: 325 PG/ML — SIGNIFICANT CHANGE UP (ref 232–1245)
WBC # BLD: 7.4 K/UL — SIGNIFICANT CHANGE UP (ref 4.8–10.8)
WBC # FLD AUTO: 7.4 K/UL — SIGNIFICANT CHANGE UP (ref 4.8–10.8)

## 2019-03-19 RX ORDER — CELECOXIB 200 MG/1
100 CAPSULE ORAL EVERY 12 HOURS
Qty: 0 | Refills: 0 | Status: DISCONTINUED | OUTPATIENT
Start: 2019-03-19 | End: 2019-03-19

## 2019-03-19 RX ORDER — CELECOXIB 200 MG/1
100 CAPSULE ORAL EVERY 12 HOURS
Qty: 0 | Refills: 0 | Status: DISCONTINUED | OUTPATIENT
Start: 2019-03-19 | End: 2019-03-20

## 2019-03-19 RX ORDER — POLYETHYLENE GLYCOL 3350 17 G/17G
17 POWDER, FOR SOLUTION ORAL
Qty: 0 | Refills: 0 | Status: DISCONTINUED | OUTPATIENT
Start: 2019-03-19 | End: 2019-03-20

## 2019-03-19 RX ADMIN — AMLODIPINE BESYLATE 5 MILLIGRAM(S): 2.5 TABLET ORAL at 05:18

## 2019-03-19 RX ADMIN — Medication 1 TABLET(S): at 11:03

## 2019-03-19 RX ADMIN — CHLORHEXIDINE GLUCONATE 1 APPLICATION(S): 213 SOLUTION TOPICAL at 05:18

## 2019-03-19 RX ADMIN — ANASTROZOLE 1 MILLIGRAM(S): 1 TABLET ORAL at 11:03

## 2019-03-19 RX ADMIN — CELECOXIB 100 MILLIGRAM(S): 200 CAPSULE ORAL at 17:45

## 2019-03-19 RX ADMIN — OXYCODONE AND ACETAMINOPHEN 1 TABLET(S): 5; 325 TABLET ORAL at 05:27

## 2019-03-19 RX ADMIN — OXYCODONE AND ACETAMINOPHEN 1 TABLET(S): 5; 325 TABLET ORAL at 21:36

## 2019-03-19 RX ADMIN — OXYCODONE AND ACETAMINOPHEN 1 TABLET(S): 5; 325 TABLET ORAL at 06:00

## 2019-03-19 RX ADMIN — CELECOXIB 100 MILLIGRAM(S): 200 CAPSULE ORAL at 17:15

## 2019-03-19 RX ADMIN — OXYCODONE AND ACETAMINOPHEN 1 TABLET(S): 5; 325 TABLET ORAL at 22:23

## 2019-03-19 NOTE — PROGRESS NOTE ADULT - SUBJECTIVE AND OBJECTIVE BOX
SUBJECTIVE:    Patient is a 78y old Female who presents with a chief complaint of hip pain (19 Mar 2019 11:09)    Currently admitted to medicine with the primary diagnosis of Hip pain     Today is hospital day 3d. Overnight no event. Report left hip pain, have not been taking pain meds. Has numbness and tingling along ulnar nerve. Report constipation. Denied CP, SOB, abd pain, dysuria    PAST MEDICAL & SURGICAL HISTORY  HTN (hypertension)  Breast CA: S/P surgery and on hormone therapy  Status post knee replacement: B/L  H/O mastectomy, right        ALLERGIES:  No Known Allergies    MEDICATIONS:  STANDING MEDICATIONS  amLODIPine   Tablet 5 milliGRAM(s) Oral daily  anastrozole 1 milliGRAM(s) Oral daily  chlorhexidine 4% Liquid 1 Application(s) Topical <User Schedule>  docusate sodium 100 milliGRAM(s) Oral three times a day  enoxaparin Injectable 40 milliGRAM(s) SubCutaneous every 24 hours  multivitamin 1 Tablet(s) Oral daily  polyethylene glycol 3350 17 Gram(s) Oral two times a day  senna 2 Tablet(s) Oral at bedtime    PRN MEDICATIONS  oxyCODONE    5 mG/acetaminophen 325 mG 1 Tablet(s) Oral every 4 hours PRN      Hip pain      VITALS:   T(F): 99.8  HR: 80  BP: 123/71  RR: 18  SpO2: --    LABS:                        9.3    7.40  )-----------( 162      ( 19 Mar 2019 06:25 )             29.7     03-19    141  |  107  |  16  ----------------------------<  107<H>  3.7   |  21  |  0.9    Ca    8.8      19 Mar 2019 06:25  Mg     1.8     03-19    TPro  5.5<L>  /  Alb  3.2<L>  /  TBili  0.5  /  DBili  x   /  AST  12  /  ALT  7   /  AlkPhos  88  03-19                  RADIOLOGY:    PHYSICAL EXAM:    GENERAL:in pain in left hip, kristal w/ mvmt; a+o x3  HEENT: AT; PERRL, EOMI; mouth clr, nose clr  NECK: no jvd, no nodes  CHEST/LUNG: cta b/l  HEART: rrr no murmur  ABDOMEN: soft nt nd no HS radha  EXTREMITIES: b/l knee scars; + pain w/ mvmt of lt hip; severe OA noticed in hands in most knuckles, including DIPs; no tophi; no evid of RA; + marked swelling of rt elbow - feels doughy and soft; + tender; no evid of infection; + marked atrophy of hand muscles  NEUROLOGY: CN intact; can move all 4 ext, but lt leg limited a lot by pain  SKIN: no rash; no psoriasis

## 2019-03-19 NOTE — PROGRESS NOTE ADULT - ASSESSMENT
77 yo F PMH HTN, breast cancer (3.5 yr ago s/p R mastectomy, on anaztrozol), OA s/p b/l knee replacement p/w worsening L hip pain for 2 weeks. XR showed severe L hip OA, no acute fx. Hip replacement tomorrow    # L hip pain 2/2 severe OA - no evid of other rheum dz (ESR 45 is nl for her age and sex and CRP is low); also prior sx on rt hip and knees showed no other pathology  XR = severe L hip OA, no acute fracture  Hip replacement tomorrow  PT/Rehab eval after sx  pain control w/ oral meds    # Pre-op Eval  Sx risk - intermed  CXR - NAPD  EKG- NSR w/ PACs; lots of baseline artifact yielding NS ST/T changes; no path q's      # olecranon bursitis rt elbow - x-ray noted  pt had prior trauma to elbox (explains old fx's)  begin celebrex 100mg po q12  no abx needed  f/u w/ ortho  can ice pack as needed  protect elbow w/ pillow    # 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; might have carpal tunnel, as well  would explain hand muscle atrophy  outpt EMG/NCS of b/l upper ext and if + could consider hand sx eval and wrist splints  protect elbows, kristal rt - use pillow under forearms while seated in chair to raise elbows off armrests (so pt is not leaning on ulnar nerves)    # Anemia, normocytic (borderline high MCV)  TSH, B12, Fol, Retic - nl  Ferritin pending  prob An of Chr Dz    # HTN - c/w Norvasc    # Breast ca s/p mastectomy - in remission  c/w Anastrazole - can cont through sx    # DVT ppx: Lovenox subQ, hold before sx; will d/w sx team about ppx after THR    # Activity: OOBTC as tolerated    # Code status: FULL    Dispo:Left THR tomorrow, possibly [facility] for STR after POD 3

## 2019-03-19 NOTE — PROGRESS NOTE ADULT - ASSESSMENT
# L hip pain 2/2 severe OA - no evid of other rheum dz (ESR 45 is nl for her age and sex and CRP is low); also prior sx on rt hip and knees showed no other pathology  XR = severe L hip OA, no acute fracture  patient scheduled for surgery tomorrow w/ Dr Hernández  PT/Rehab eval after sx  pain control w/ oral meds    # Pre-op Eval  LRCRI = 0; pt did well w/ 3 other joint replacement surgeries  Clinical: no major med hx; breast ca in remission  Fxn - poor, secondary to pain and arthritis, was able to walk w/ walker prev and do her in house ADLs independently  Sx risk - intermed  CXR - NAPD  EKG reviewed - NSR w/ PACs; lots of baseline artifact yielding NS ST/T changes; no path q's    Overall risk is low for this pt, even w/ age 78. She should do well through surgery.    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; might have carpal tunnel, as well  would explain hand muscle atrophy  outpt EMG/NCS of b/l upper ext and if + could consider hand sx eval and wrist splints  protect elbows, kristal rt - use pillow under forearms while seated in chair to raise elbows off armrests (so pt is not leaning on ulnar nerves)    # Anemia, normocytic (borderline high MCV)  TSH, B12, Fol, Retic - nl  Ferritin pending  prob An of Chr Dz    # HTN - c/w Norvasc    # Breast ca s/p mastectomy - in remission  c/w Anastrazole - can cont through sx    # DVT ppx: Lovenox subQ; will d/w sx team about ppx after THR    # Activity: OOBTC as tolerated    # Code status: FULL    Dispo: f/u w/ ortho for elective Left THR tomorrow (pt has already had b/l TKR and rt THR); pain control; will likely go to [facility] for STR after POD 3 (assuming all goes well)

## 2019-03-19 NOTE — PROGRESS NOTE ADULT - SUBJECTIVE AND OBJECTIVE BOX
BHAVIN CHATMAN  78y  Female  ***My note supersedes ALL resident notes that I sign.  My corrections for their notes are in my note.***    I can be reached directly on [id number]. My office number is [phone]. My personal cell number is [phone].    INTERVAL EVENTS: Here for f/u of OA. Pt doing OK. No complaints.     T(F): 99.8 (03-19-19 @ 04:51), Max: 99.8 (03-19-19 @ 04:51)  HR: 80 (03-19-19 @ 04:51) (63 - 80)  BP: 123/71 (03-19-19 @ 04:51) (119/56 - 142/65)  RR: 18 (03-19-19 @ 04:51) (18 - 18)  SpO2: --    GENERAL: anxious a little, in pain in left hip, kristal w/ mvmt; a+o x3  HEENT: AT; PERRL, EOMI; mouth clr, nose clr  NECK: no jvd, no nodes  CHEST/LUNG: cta b/l  HEART: rrr no murmur  ABDOMEN: soft nt nd no HS radha  EXTREMITIES: b/l knee scars; + pain w/ mvmt of lt hip; severe OA noticed in hands in most knuckles, including DIPs; no tophi; no evid of RA; + marked swelling of rt elbow - feels doughy and soft; + tender; no evid of infection; + marked atrophy of hand muscles  NEUROLOGY: CN intact; can move all 4 ext, but lt leg limited a lot by pain  SKIN: no rash; no psoriasis    LABS:                        9.3     (    95.2   7.40  )-----------( ---------      162      ( 19 Mar 2019 06:25 )             29.7    (    14.0     141   (   107   (   107      03-19-19 @ 06:25  ----------------------               3.7   (   21   (   16                             -----                        0.9  Ca  8.8   Mg  1.8    P   --     LFT  5.5  (  0.5  (  12       03-19-19 @ 06:25  -------------------------  3.2  (  88  (  7    RADIOLOGY & ADDITIONAL TESTS:  < from: Xray Chest 1 View- PORTABLE-Routine (03.18.19 @ 15:37) >  Impression:      No radiographic evidence of acute pulmonary disease.    < end of copied text >    < from: Xray Elbow AP + Lateral, Right (03.17.19 @ 12:33) >  Findings/  impression: Elbow deformity present with supracondylar distal humeral   fracture deformities with resorption of the condyles,   deformities/resorption of the radial head and proximal ulna. There is   bony erosion and fragmentation associated with large olecranon bursitis.   Differential considerations for this appearance are numerous and include   infectious/inflammatory etiologies    Large olecranon bursitis is nonspecific and may be secondary to   infectious/inflammatory etiologies. Recommend laboratory correlation    < end of copied text >    MEDICATIONS:    amLODIPine   Tablet 5 milliGRAM(s) Oral daily  anastrozole 1 milliGRAM(s) Oral daily  chlorhexidine 4% Liquid 1 Application(s) Topical <User Schedule>  docusate sodium 100 milliGRAM(s) Oral three times a day  enoxaparin Injectable 40 milliGRAM(s) SubCutaneous every 24 hours  multivitamin 1 Tablet(s) Oral daily  oxyCODONE    5 mG/acetaminophen 325 mG 1 Tablet(s) Oral every 4 hours PRN  polyethylene glycol 3350 17 Gram(s) Oral two times a day  senna 2 Tablet(s) Oral at bedtime

## 2019-03-20 ENCOUNTER — RESULT REVIEW (OUTPATIENT)
Age: 79
End: 2019-03-20

## 2019-03-20 LAB
ALBUMIN SERPL ELPH-MCNC: 3.2 G/DL — LOW (ref 3.5–5.2)
ALP SERPL-CCNC: 92 U/L — SIGNIFICANT CHANGE UP (ref 30–115)
ALT FLD-CCNC: 7 U/L — SIGNIFICANT CHANGE UP (ref 0–41)
ANION GAP SERPL CALC-SCNC: 14 MMOL/L — SIGNIFICANT CHANGE UP (ref 7–14)
AST SERPL-CCNC: 12 U/L — SIGNIFICANT CHANGE UP (ref 0–41)
BILIRUB SERPL-MCNC: 0.5 MG/DL — SIGNIFICANT CHANGE UP (ref 0.2–1.2)
BUN SERPL-MCNC: 17 MG/DL — SIGNIFICANT CHANGE UP (ref 10–20)
CALCIUM SERPL-MCNC: 9.1 MG/DL — SIGNIFICANT CHANGE UP (ref 8.5–10.1)
CHLORIDE SERPL-SCNC: 109 MMOL/L — SIGNIFICANT CHANGE UP (ref 98–110)
CO2 SERPL-SCNC: 21 MMOL/L — SIGNIFICANT CHANGE UP (ref 17–32)
CREAT SERPL-MCNC: 1 MG/DL — SIGNIFICANT CHANGE UP (ref 0.7–1.5)
FERRITIN SERPL-MCNC: 268 NG/ML — HIGH (ref 15–150)
GLUCOSE BLDC GLUCOMTR-MCNC: 82 MG/DL — SIGNIFICANT CHANGE UP (ref 70–99)
GLUCOSE BLDC GLUCOMTR-MCNC: 93 MG/DL — SIGNIFICANT CHANGE UP (ref 70–99)
GLUCOSE BLDC GLUCOMTR-MCNC: 95 MG/DL — SIGNIFICANT CHANGE UP (ref 70–99)
GLUCOSE SERPL-MCNC: 104 MG/DL — HIGH (ref 70–99)
HCT VFR BLD CALC: 29.4 % — LOW (ref 37–47)
HCT VFR BLD CALC: 32 % — LOW (ref 37–47)
HGB BLD-MCNC: 9 G/DL — LOW (ref 12–16)
HGB BLD-MCNC: 9.9 G/DL — LOW (ref 12–16)
MAGNESIUM SERPL-MCNC: 1.9 MG/DL — SIGNIFICANT CHANGE UP (ref 1.8–2.4)
MCHC RBC-ENTMCNC: 29 PG — SIGNIFICANT CHANGE UP (ref 27–31)
MCHC RBC-ENTMCNC: 29.4 PG — SIGNIFICANT CHANGE UP (ref 27–31)
MCHC RBC-ENTMCNC: 30.6 G/DL — LOW (ref 32–37)
MCHC RBC-ENTMCNC: 30.9 G/DL — LOW (ref 32–37)
MCV RBC AUTO: 93.8 FL — SIGNIFICANT CHANGE UP (ref 81–99)
MCV RBC AUTO: 96.1 FL — SIGNIFICANT CHANGE UP (ref 81–99)
NRBC # BLD: 0 /100 WBCS — SIGNIFICANT CHANGE UP (ref 0–0)
NRBC # BLD: 0 /100 WBCS — SIGNIFICANT CHANGE UP (ref 0–0)
PLATELET # BLD AUTO: 151 K/UL — SIGNIFICANT CHANGE UP (ref 130–400)
PLATELET # BLD AUTO: 172 K/UL — SIGNIFICANT CHANGE UP (ref 130–400)
POTASSIUM SERPL-MCNC: 3.8 MMOL/L — SIGNIFICANT CHANGE UP (ref 3.5–5)
POTASSIUM SERPL-SCNC: 3.8 MMOL/L — SIGNIFICANT CHANGE UP (ref 3.5–5)
PROT SERPL-MCNC: 5.7 G/DL — LOW (ref 6–8)
RBC # BLD: 3.06 M/UL — LOW (ref 4.2–5.4)
RBC # BLD: 3.41 M/UL — LOW (ref 4.2–5.4)
RBC # FLD: 14.1 % — SIGNIFICANT CHANGE UP (ref 11.5–14.5)
RBC # FLD: 14.2 % — SIGNIFICANT CHANGE UP (ref 11.5–14.5)
SODIUM SERPL-SCNC: 144 MMOL/L — SIGNIFICANT CHANGE UP (ref 135–146)
WBC # BLD: 7.53 K/UL — SIGNIFICANT CHANGE UP (ref 4.8–10.8)
WBC # BLD: 8.99 K/UL — SIGNIFICANT CHANGE UP (ref 4.8–10.8)
WBC # FLD AUTO: 7.53 K/UL — SIGNIFICANT CHANGE UP (ref 4.8–10.8)
WBC # FLD AUTO: 8.99 K/UL — SIGNIFICANT CHANGE UP (ref 4.8–10.8)

## 2019-03-20 RX ORDER — OXYCODONE HYDROCHLORIDE 5 MG/1
10 TABLET ORAL EVERY 4 HOURS
Qty: 0 | Refills: 0 | Status: DISCONTINUED | OUTPATIENT
Start: 2019-03-20 | End: 2019-03-22

## 2019-03-20 RX ORDER — CELECOXIB 200 MG/1
100 CAPSULE ORAL EVERY 12 HOURS
Qty: 0 | Refills: 0 | Status: DISCONTINUED | OUTPATIENT
Start: 2019-03-20 | End: 2019-03-22

## 2019-03-20 RX ORDER — CHLORHEXIDINE GLUCONATE 213 G/1000ML
1 SOLUTION TOPICAL
Qty: 0 | Refills: 0 | Status: DISCONTINUED | OUTPATIENT
Start: 2019-03-20 | End: 2019-03-22

## 2019-03-20 RX ORDER — DOCUSATE SODIUM 100 MG
100 CAPSULE ORAL THREE TIMES A DAY
Qty: 0 | Refills: 0 | Status: DISCONTINUED | OUTPATIENT
Start: 2019-03-20 | End: 2019-03-22

## 2019-03-20 RX ORDER — GABAPENTIN 400 MG/1
100 CAPSULE ORAL THREE TIMES A DAY
Qty: 0 | Refills: 0 | Status: DISCONTINUED | OUTPATIENT
Start: 2019-03-20 | End: 2019-03-22

## 2019-03-20 RX ORDER — AMLODIPINE BESYLATE 2.5 MG/1
5 TABLET ORAL DAILY
Qty: 0 | Refills: 0 | Status: DISCONTINUED | OUTPATIENT
Start: 2019-03-20 | End: 2019-03-22

## 2019-03-20 RX ORDER — SODIUM CHLORIDE 9 MG/ML
1000 INJECTION, SOLUTION INTRAVENOUS
Qty: 0 | Refills: 0 | Status: DISCONTINUED | OUTPATIENT
Start: 2019-03-20 | End: 2019-03-20

## 2019-03-20 RX ORDER — SENNA PLUS 8.6 MG/1
2 TABLET ORAL AT BEDTIME
Qty: 0 | Refills: 0 | Status: DISCONTINUED | OUTPATIENT
Start: 2019-03-20 | End: 2019-03-22

## 2019-03-20 RX ORDER — CEFAZOLIN SODIUM 1 G
2000 VIAL (EA) INJECTION EVERY 8 HOURS
Qty: 0 | Refills: 0 | Status: COMPLETED | OUTPATIENT
Start: 2019-03-20 | End: 2019-03-21

## 2019-03-20 RX ORDER — ACETAMINOPHEN 500 MG
650 TABLET ORAL EVERY 6 HOURS
Qty: 0 | Refills: 0 | Status: DISCONTINUED | OUTPATIENT
Start: 2019-03-20 | End: 2019-03-22

## 2019-03-20 RX ORDER — ONDANSETRON 8 MG/1
4 TABLET, FILM COATED ORAL EVERY 6 HOURS
Qty: 0 | Refills: 0 | Status: DISCONTINUED | OUTPATIENT
Start: 2019-03-20 | End: 2019-03-22

## 2019-03-20 RX ORDER — ACETAMINOPHEN 500 MG
1000 TABLET ORAL ONCE
Qty: 0 | Refills: 0 | Status: DISCONTINUED | OUTPATIENT
Start: 2019-03-20 | End: 2019-03-20

## 2019-03-20 RX ORDER — ENOXAPARIN SODIUM 100 MG/ML
30 INJECTION SUBCUTANEOUS ONCE
Qty: 0 | Refills: 0 | Status: COMPLETED | OUTPATIENT
Start: 2019-03-21 | End: 2019-03-21

## 2019-03-20 RX ORDER — PANTOPRAZOLE SODIUM 20 MG/1
40 TABLET, DELAYED RELEASE ORAL
Qty: 0 | Refills: 0 | Status: DISCONTINUED | OUTPATIENT
Start: 2019-03-20 | End: 2019-03-22

## 2019-03-20 RX ORDER — SODIUM CHLORIDE 9 MG/ML
1000 INJECTION INTRAMUSCULAR; INTRAVENOUS; SUBCUTANEOUS
Qty: 0 | Refills: 0 | Status: DISCONTINUED | OUTPATIENT
Start: 2019-03-20 | End: 2019-03-20

## 2019-03-20 RX ORDER — MEPERIDINE HYDROCHLORIDE 50 MG/ML
12.5 INJECTION INTRAMUSCULAR; INTRAVENOUS; SUBCUTANEOUS
Qty: 0 | Refills: 0 | Status: DISCONTINUED | OUTPATIENT
Start: 2019-03-20 | End: 2019-03-20

## 2019-03-20 RX ORDER — MORPHINE SULFATE 50 MG/1
2 CAPSULE, EXTENDED RELEASE ORAL
Qty: 0 | Refills: 0 | Status: DISCONTINUED | OUTPATIENT
Start: 2019-03-20 | End: 2019-03-20

## 2019-03-20 RX ORDER — ASPIRIN/CALCIUM CARB/MAGNESIUM 324 MG
81 TABLET ORAL
Qty: 0 | Refills: 0 | Status: DISCONTINUED | OUTPATIENT
Start: 2019-03-20 | End: 2019-03-22

## 2019-03-20 RX ORDER — ONDANSETRON 8 MG/1
4 TABLET, FILM COATED ORAL ONCE
Qty: 0 | Refills: 0 | Status: DISCONTINUED | OUTPATIENT
Start: 2019-03-20 | End: 2019-03-20

## 2019-03-20 RX ORDER — SODIUM CHLORIDE 9 MG/ML
1000 INJECTION INTRAMUSCULAR; INTRAVENOUS; SUBCUTANEOUS
Qty: 0 | Refills: 0 | Status: DISCONTINUED | OUTPATIENT
Start: 2019-03-20 | End: 2019-03-21

## 2019-03-20 RX ORDER — OXYCODONE HYDROCHLORIDE 5 MG/1
5 TABLET ORAL EVERY 4 HOURS
Qty: 0 | Refills: 0 | Status: DISCONTINUED | OUTPATIENT
Start: 2019-03-20 | End: 2019-03-22

## 2019-03-20 RX ORDER — POLYETHYLENE GLYCOL 3350 17 G/17G
17 POWDER, FOR SOLUTION ORAL
Qty: 0 | Refills: 0 | Status: COMPLETED | OUTPATIENT
Start: 2019-03-20 | End: 2019-03-22

## 2019-03-20 RX ORDER — MORPHINE SULFATE 50 MG/1
1 CAPSULE, EXTENDED RELEASE ORAL
Qty: 0 | Refills: 0 | Status: DISCONTINUED | OUTPATIENT
Start: 2019-03-20 | End: 2019-03-20

## 2019-03-20 RX ORDER — MORPHINE SULFATE 50 MG/1
2 CAPSULE, EXTENDED RELEASE ORAL
Qty: 0 | Refills: 0 | Status: DISCONTINUED | OUTPATIENT
Start: 2019-03-20 | End: 2019-03-22

## 2019-03-20 RX ORDER — ANASTROZOLE 1 MG/1
1 TABLET ORAL DAILY
Qty: 0 | Refills: 0 | Status: DISCONTINUED | OUTPATIENT
Start: 2019-03-20 | End: 2019-03-22

## 2019-03-20 RX ADMIN — Medication 100 MILLIGRAM(S): at 21:18

## 2019-03-20 RX ADMIN — Medication 100 MILLIGRAM(S): at 23:11

## 2019-03-20 RX ADMIN — CELECOXIB 100 MILLIGRAM(S): 200 CAPSULE ORAL at 06:09

## 2019-03-20 RX ADMIN — CHLORHEXIDINE GLUCONATE 1 APPLICATION(S): 213 SOLUTION TOPICAL at 06:52

## 2019-03-20 RX ADMIN — SENNA PLUS 2 TABLET(S): 8.6 TABLET ORAL at 21:18

## 2019-03-20 RX ADMIN — AMLODIPINE BESYLATE 5 MILLIGRAM(S): 2.5 TABLET ORAL at 06:04

## 2019-03-20 RX ADMIN — GABAPENTIN 100 MILLIGRAM(S): 400 CAPSULE ORAL at 21:18

## 2019-03-20 RX ADMIN — Medication 650 MILLIGRAM(S): at 20:40

## 2019-03-20 RX ADMIN — MORPHINE SULFATE 2 MILLIGRAM(S): 50 CAPSULE, EXTENDED RELEASE ORAL at 21:18

## 2019-03-20 RX ADMIN — CELECOXIB 100 MILLIGRAM(S): 200 CAPSULE ORAL at 06:04

## 2019-03-20 RX ADMIN — SODIUM CHLORIDE 75 MILLILITER(S): 9 INJECTION INTRAMUSCULAR; INTRAVENOUS; SUBCUTANEOUS at 19:22

## 2019-03-20 NOTE — PROGRESS NOTE ADULT - SUBJECTIVE AND OBJECTIVE BOX
SUBJECTIVE:    Patient is a 78y old Female who presents with a chief complaint of hip pain (20 Mar 2019 12:24)    Currently admitted to medicine with the primary diagnosis of Hip pain     Today is hospital day 4d. Overnight no event. Denied CP, SOB, abd pain, dysuria    PAST MEDICAL & SURGICAL HISTORY  HTN (hypertension)  Breast CA: S/P surgery and on hormone therapy  Status post knee replacement: B/L  H/O mastectomy, right        ALLERGIES:  No Known Allergies    MEDICATIONS:  STANDING MEDICATIONS  amLODIPine   Tablet 5 milliGRAM(s) Oral daily  anastrozole 1 milliGRAM(s) Oral daily  celecoxib 100 milliGRAM(s) Oral every 12 hours  chlorhexidine 4% Liquid 1 Application(s) Topical <User Schedule>  docusate sodium 100 milliGRAM(s) Oral three times a day  multivitamin 1 Tablet(s) Oral daily  polyethylene glycol 3350 17 Gram(s) Oral two times a day  senna 2 Tablet(s) Oral at bedtime  sodium chloride 0.9%. 1000 milliLiter(s) IV Continuous <Continuous>    PRN MEDICATIONS  oxyCODONE    5 mG/acetaminophen 325 mG 1 Tablet(s) Oral every 4 hours PRN      Hip pain      VITALS:   T(F): 98.2  HR: 73  BP: 129/61  RR: 19  SpO2: 95%    LABS:                        9.9    7.53  )-----------( 172      ( 20 Mar 2019 06:59 )             32.0     03-20    144  |  109  |  17  ----------------------------<  104<H>  3.8   |  21  |  1.0    Ca    9.1      20 Mar 2019 06:59  Mg     1.9     03-20    TPro  5.7<L>  /  Alb  3.2<L>  /  TBili  0.5  /  DBili  x   /  AST  12  /  ALT  7   /  AlkPhos  92  03-20                  RADIOLOGY:    PHYSICAL EXAM:  GENERAL: NAD, speaks in full sentences, no signs of respiratory distress  HEAD:  Atraumatic, Normocephalic  EYES: EOMI, PERRLA, conjunctiva and sclera clear  NECK: Supple, No JVD  PULM: CTAB; No wheeze; No crackles; No accessory muscles used  CVA: Regular rate and rhythm; No murmurs;   ABDOMEN: Soft, Nontender, Nondistended; Bowel sounds present; No guarding  EXTREMITIES:  2+ Peripheral Pulses, No cyanosis or edema  PSYCH: AAOx3  NEUROLOGY: non-focal  SKIN: No rashes or lesions SUBJECTIVE:    Patient is a 78y old Female who presents with a chief complaint of hip pain (20 Mar 2019 12:24)    Currently admitted to medicine with the primary diagnosis of Hip pain     Today is hospital day 4d. Overnight no event. Report left hip pain on mvt. R elbow pain stable with numbness and tingling. Denied CP, SOB, abd pain, dysuria. Had BM yesterday after miralax. A&Ox3    PAST MEDICAL & SURGICAL HISTORY  HTN (hypertension)  Breast CA: S/P surgery and on hormone therapy  Status post knee replacement: B/L  H/O mastectomy, right        ALLERGIES:  No Known Allergies    MEDICATIONS:  STANDING MEDICATIONS  amLODIPine   Tablet 5 milliGRAM(s) Oral daily  anastrozole 1 milliGRAM(s) Oral daily  celecoxib 100 milliGRAM(s) Oral every 12 hours  chlorhexidine 4% Liquid 1 Application(s) Topical <User Schedule>  docusate sodium 100 milliGRAM(s) Oral three times a day  multivitamin 1 Tablet(s) Oral daily  polyethylene glycol 3350 17 Gram(s) Oral two times a day  senna 2 Tablet(s) Oral at bedtime  sodium chloride 0.9%. 1000 milliLiter(s) IV Continuous <Continuous>    PRN MEDICATIONS  oxyCODONE    5 mG/acetaminophen 325 mG 1 Tablet(s) Oral every 4 hours PRN      Hip pain      VITALS:   T(F): 98.2  HR: 73  BP: 129/61  RR: 19  SpO2: 95%    LABS:                        9.9    7.53  )-----------( 172      ( 20 Mar 2019 06:59 )             32.0     03-20    144  |  109  |  17  ----------------------------<  104<H>  3.8   |  21  |  1.0    Ca    9.1      20 Mar 2019 06:59  Mg     1.9     03-20    TPro  5.7<L>  /  Alb  3.2<L>  /  TBili  0.5  /  DBili  x   /  AST  12  /  ALT  7   /  AlkPhos  92  03-20                  RADIOLOGY:    PHYSICAL EXAM:  GENERAL:  pain in left hip, kristal w/ mvmt; a+o x3  HEENT: AT; PERRL, EOMI; mouth clr, nose clr  NECK: no jvd, no nodes  CHEST/LUNG: cta b/l  HEART: rrr no murmur  ABDOMEN: soft nt nd no HS radha  EXTREMITIES: b/l knee scars; + pain w/ mvmt of lt hip; severe OA noticed in hands in most knuckles, including DIPs; no tophi; no evid of RA; + marked swelling of rt elbow; + tender; no evid of infection; + marked atrophy of hand muscles  NEUROLOGY: CN intact; can move all 4 ext, but lt leg limited a lot by pain  SKIN: no rash; no psoriasis

## 2019-03-20 NOTE — PROGRESS NOTE ADULT - SUBJECTIVE AND OBJECTIVE BOX
pt scheduled for OR today for LTHA  DNR in chart, d/w patient and family, temporarily rescinded for OR.

## 2019-03-20 NOTE — PROGRESS NOTE ADULT - SUBJECTIVE AND OBJECTIVE BOX
POD#0 S/P L SOHA. Comfortable, pain moderately well controlled. No cp or sob. Tolerating fluids. No n/v, f/c.    Vital Signs Last 24 Hrs  T(C): 36.7 (20 Mar 2019 20:24), Max: 37.1 (20 Mar 2019 19:30)  T(F): 98 (20 Mar 2019 20:24), Max: 98.7 (20 Mar 2019 19:30)  HR: 99 (20 Mar 2019 20:24) (73 - 99)  BP: 144/65 (20 Mar 2019 20:24) (13/70 - 146/87)  RR: 20 (20 Mar 2019 20:24) (15 - 20)  SpO2: 97% (20 Mar 2019 20:00) (92% - 97%)    HR 90s oVSS  NAD unlabored resp  LLE:  Dressing clean, dry intact  Compartments soft  Motor ehl/fhl/gs/ta 5/5  SILT s/s/dp/sp/t  foot wwp cr<2                        9.0    8.99  )-----------( 151      ( 20 Mar 2019 18:07 )             29.4     POD#0 s/p L SOHA. Uncomplicated  - trend hgb transfuse prn  - WBAT  - 24h IVabx  - PT/OT  - Pain control  - OOBTC, IS  - home meds  - DVT ppx chem + SCD  - Ortho following

## 2019-03-20 NOTE — PROGRESS NOTE ADULT - SUBJECTIVE AND OBJECTIVE BOX
BHAVIN CHATMAN  78y  Female  ***My note supersedes ALL resident notes that I sign.  My corrections for their notes are in my note.***    I can be reached directly on [employer] [id number]. My office number is [phone]. My personal cell number is [phone].    INTERVAL EVENTS: Here for f/u of OA. Seems all women in her family have severe OA.  Pt ready for sx today.    T(F): 98.2 (03-20-19 @ 05:00), Max: 98.7 (03-19-19 @ 21:01)  HR: 73 (03-20-19 @ 05:00) (73 - 140)  BP: 129/61 (03-20-19 @ 05:00) (104/57 - 145/63)  RR: 19 (03-20-19 @ 05:00) (18 - 20)  SpO2: 95% (03-19-19 @ 19:47) (95% - 95%)    GENERAL: anxious a little, in pain in left hip, kristal w/ mvmt; a+o x3  HEENT: AT; PERRL, EOMI; mouth clr, nose clr  NECK: no jvd, no nodes  CHEST/LUNG: cta b/l  HEART: rrr no murmur  ABDOMEN: soft nt nd no HS radha  EXTREMITIES: b/l knee scars; + pain w/ mvmt of lt hip; severe OA noticed in hands in most knuckles, including DIPs; no tophi; no evid of RA; + marked swelling of rt elbow - feels doughy and soft; + tender; no evid of infection; + marked atrophy of hand muscles  NEUROLOGY: CN intact; can move all 4 ext, but lt leg limited a lot by pain  SKIN: no rash; no psoriasis    LABS:                        9.9     (    93.8   7.53  )-----------( ---------      172      ( 20 Mar 2019 06:59 )             32.0    (    14.2     144   (   109   (   104      03-20-19 @ 06:59  ----------------------               3.8   (   21   (   17                             -----                        1.0  Ca  9.1   Mg  1.9    P   --     LFT  5.7  (  0.5  (  12       03-20-19 @ 06:59  -------------------------  3.2  (  92  (  7    RADIOLOGY & ADDITIONAL TESTS:      MEDICATIONS:    amLODIPine   Tablet 5 milliGRAM(s) Oral daily  anastrozole 1 milliGRAM(s) Oral daily  celecoxib 100 milliGRAM(s) Oral every 12 hours  chlorhexidine 4% Liquid 1 Application(s) Topical <User Schedule>  docusate sodium 100 milliGRAM(s) Oral three times a day  multivitamin 1 Tablet(s) Oral daily  oxyCODONE    5 mG/acetaminophen 325 mG 1 Tablet(s) Oral every 4 hours PRN  polyethylene glycol 3350 17 Gram(s) Oral two times a day  senna 2 Tablet(s) Oral at bedtime  sodium chloride 0.9%. 1000 milliLiter(s) IV Continuous <Continuous>

## 2019-03-20 NOTE — PROGRESS NOTE ADULT - ASSESSMENT
# L hip pain 2/2 severe OA - no evid of other rheum dz (ESR 45 is nl for her age and sex and CRP is low); also prior sx on rt hip and knees showed no other pathology  XR = severe L hip OA, no acute fracture  patient scheduled for surgery today w/ Dr Hernández  PT/Rehab eval after sx  pain control w/ oral meds - might need IV post-op    # Pre-op Eval  LRCRI = 0; pt did well w/ 3 other joint replacement surgeries  Clinical: no major med hx; breast ca in remission  Fxn - poor, secondary to pain and arthritis, was able to walk w/ walker prev and do her in house ADLs independently  Sx risk - intermed  CXR - NAPD  EKG reviewed - NSR w/ PACs; lots of baseline artifact yielding NS ST/T changes; no path q's    Overall risk is low for this pt, even w/ age 78. She should do well through surgery.    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; might have carpal tunnel, as well  would explain hand muscle atrophy  outpt EMG/NCS of b/l upper ext and if + could consider hand sx eval and wrist splints  protect elbows, kristal rt - use pillow under forearms while seated in chair to raise elbows off armrests (so pt is not leaning on ulnar nerves)    # Anemia, normocytic (borderline high MCV)  TSH, B12, Fol, Retic - nl  Ferritin 268; no iron def  prob An of Chr Dz    # HTN - c/w Norvasc    # Breast ca s/p mastectomy - in remission  c/w Anastrazole - can cont through sx    # DVT ppx: Lovenox subQ (hold for OR); will d/w sx team about ppx after THR    # Activity: OOBTC as tolerated    # Code status: FULL    Dispo: f/u w/ ortho for elective Left THR today (pt has already had b/l TKR and rt THR); pain control; will likely go to [facility] for STR after POD 3 (assuming all goes well)

## 2019-03-20 NOTE — BRIEF OPERATIVE NOTE - COMMENTS
tee trident cup, 50 mm, 6.5mm bone screw x 2, 36 neutral liner, size 3 actis femoral stem std offset, 36 +5 femoral head

## 2019-03-20 NOTE — PROGRESS NOTE ADULT - ASSESSMENT
79 yo F PMH HTN, breast cancer (3.5 yr ago s/p R mastectomy, on anaztrozol), OA s/p b/l knee replacement p/w worsening L hip pain for 2 weeks. XR showed severe L hip OA, no acute fx. Hip replacement today    # L hip pain 2/2 severe OA - no evid of other rheum dz (ESR 45 nl and CRP low); also prior sx on rt hip and knees showed no other pathology  XR = severe L hip OA, no acute fracture  Sx today  PT/Rehab eval after sx  pain control w/ oral meds - might need IV post-op    # Pre-op Eval  Sx risk - intermed  CXR - NAPD  EKG- NSR w/ PACs; lots of baseline artifact yielding NS ST/T changes; no path q's    # olecranon bursitis rt elbow   X ray shows olecranon bursitis  prior trauma to elbow (explains old fx's)  c/w celebrex 100mg po q12 (started this admission)  no abx needed  f/u w/ ortho  can ice pack as needed  protect elbow w/ pillow    # 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; might have carpal tunnel  hand muscle atrophy  outpt EMG/NCS of b/l upper ext and if + could consider hand sx eval and wrist splints  Protect elbows and raise elbows off armrests (so pt is not leaning on ulnar nerves)    # Anemia, normocytic (borderline high MCV)  TSH, B12, Fol, Retic - nl  Ferritin 268; no iron def  An of Chr Dz    # HTN - c/w Norvasc    # Breast ca s/p mastectomy - in remission  c/w Anastrazole - can cont through sx    # DVT ppx: Lovenox subQ (hold for OR); will d/w sx team about ppx after THR    # Activity: OOBTC as tolerated    # Code status: FULL    Dispo: Left hip sx today. [facility] for STR after POD 3

## 2019-03-20 NOTE — CHART NOTE - NSCHARTNOTEFT_GEN_A_CORE
PACU ANESTHESIA ADMISSION NOTE      Procedure:   Post op diagnosis:      ____  Intubated  TV:______       Rate: ______      FiO2: ______    __x__  Patent Airway    __x__  Full return of protective reflexes    __x__  Full recovery from anesthesia / back to baseline status    Vitals:  T(C): 36.8 (03-20-19 @ 05:04), Max: 37.1 (03-19-19 @ 21:01)  HR: 73 (03-20-19 @ 05:04) (73 - 140)  BP: 129/61 (03-20-19 @ 05:04) (104/57 - 129/61)  RR: 16 (03-20-19 @ 05:04) (16 - 20)  SpO2: 95% (03-19-19 @ 19:47) (95% - 95%)    Mental Status:  __x__ Awake   ___x__ Alert   _____ Drowsy   _____ Sedated    Nausea/Vomiting:  __x__ NO  ______Yes,   See Post - Op Orders          Pain Scale (0-10):  _____    Treatment: ____ None    __x__ See Post - Op/PCA Orders    Post - Operative Fluids:   ____ Oral   __x__ See Post - Op Orders    Plan: Discharge:   ____Home       __X___Floor     _____Critical Care    _____  Other:_________________    Comments: Patient had smooth intraoperative event, no anesthesia complication.  PACU Vital signs: HR: 87   BP:     110   / 75  RR:20             O2 Sat:   97    %     Temp: 97.8

## 2019-03-21 LAB
ALBUMIN SERPL ELPH-MCNC: 3.2 G/DL — LOW (ref 3.5–5.2)
ALP SERPL-CCNC: 89 U/L — SIGNIFICANT CHANGE UP (ref 30–115)
ALT FLD-CCNC: 13 U/L — SIGNIFICANT CHANGE UP (ref 0–41)
ANION GAP SERPL CALC-SCNC: 17 MMOL/L — HIGH (ref 7–14)
ANION GAP SERPL CALC-SCNC: 18 MMOL/L — HIGH (ref 7–14)
AST SERPL-CCNC: 32 U/L — SIGNIFICANT CHANGE UP (ref 0–41)
BILIRUB SERPL-MCNC: 0.5 MG/DL — SIGNIFICANT CHANGE UP (ref 0.2–1.2)
BUN SERPL-MCNC: 15 MG/DL — SIGNIFICANT CHANGE UP (ref 10–20)
BUN SERPL-MCNC: 15 MG/DL — SIGNIFICANT CHANGE UP (ref 10–20)
CALCIUM SERPL-MCNC: 8.5 MG/DL — SIGNIFICANT CHANGE UP (ref 8.5–10.1)
CALCIUM SERPL-MCNC: 8.6 MG/DL — SIGNIFICANT CHANGE UP (ref 8.5–10.1)
CHLORIDE SERPL-SCNC: 107 MMOL/L — SIGNIFICANT CHANGE UP (ref 98–110)
CHLORIDE SERPL-SCNC: 108 MMOL/L — SIGNIFICANT CHANGE UP (ref 98–110)
CO2 SERPL-SCNC: 18 MMOL/L — SIGNIFICANT CHANGE UP (ref 17–32)
CO2 SERPL-SCNC: 19 MMOL/L — SIGNIFICANT CHANGE UP (ref 17–32)
CREAT SERPL-MCNC: 1 MG/DL — SIGNIFICANT CHANGE UP (ref 0.7–1.5)
CREAT SERPL-MCNC: 1.1 MG/DL — SIGNIFICANT CHANGE UP (ref 0.7–1.5)
GLUCOSE SERPL-MCNC: 131 MG/DL — HIGH (ref 70–99)
GLUCOSE SERPL-MCNC: 131 MG/DL — HIGH (ref 70–99)
HCT VFR BLD CALC: 32 % — LOW (ref 37–47)
HGB BLD-MCNC: 9.9 G/DL — LOW (ref 12–16)
MAGNESIUM SERPL-MCNC: 1.7 MG/DL — LOW (ref 1.8–2.4)
MCHC RBC-ENTMCNC: 29.5 PG — SIGNIFICANT CHANGE UP (ref 27–31)
MCHC RBC-ENTMCNC: 30.9 G/DL — LOW (ref 32–37)
MCV RBC AUTO: 95.2 FL — SIGNIFICANT CHANGE UP (ref 81–99)
NRBC # BLD: 0 /100 WBCS — SIGNIFICANT CHANGE UP (ref 0–0)
PLATELET # BLD AUTO: 169 K/UL — SIGNIFICANT CHANGE UP (ref 130–400)
POTASSIUM SERPL-MCNC: 3.9 MMOL/L — SIGNIFICANT CHANGE UP (ref 3.5–5)
POTASSIUM SERPL-MCNC: 3.9 MMOL/L — SIGNIFICANT CHANGE UP (ref 3.5–5)
POTASSIUM SERPL-SCNC: 3.9 MMOL/L — SIGNIFICANT CHANGE UP (ref 3.5–5)
POTASSIUM SERPL-SCNC: 3.9 MMOL/L — SIGNIFICANT CHANGE UP (ref 3.5–5)
PROT SERPL-MCNC: 5.6 G/DL — LOW (ref 6–8)
RBC # BLD: 3.36 M/UL — LOW (ref 4.2–5.4)
RBC # FLD: 14.1 % — SIGNIFICANT CHANGE UP (ref 11.5–14.5)
SODIUM SERPL-SCNC: 143 MMOL/L — SIGNIFICANT CHANGE UP (ref 135–146)
SODIUM SERPL-SCNC: 144 MMOL/L — SIGNIFICANT CHANGE UP (ref 135–146)
WBC # BLD: 15.03 K/UL — HIGH (ref 4.8–10.8)
WBC # FLD AUTO: 15.03 K/UL — HIGH (ref 4.8–10.8)

## 2019-03-21 RX ORDER — MAGNESIUM SULFATE 500 MG/ML
2 VIAL (ML) INJECTION ONCE
Qty: 0 | Refills: 0 | Status: COMPLETED | OUTPATIENT
Start: 2019-03-21 | End: 2019-03-21

## 2019-03-21 RX ORDER — SODIUM CHLORIDE 9 MG/ML
1000 INJECTION INTRAMUSCULAR; INTRAVENOUS; SUBCUTANEOUS
Qty: 0 | Refills: 0 | Status: DISCONTINUED | OUTPATIENT
Start: 2019-03-21 | End: 2019-03-22

## 2019-03-21 RX ORDER — SODIUM CHLORIDE 9 MG/ML
500 INJECTION INTRAMUSCULAR; INTRAVENOUS; SUBCUTANEOUS ONCE
Qty: 0 | Refills: 0 | Status: COMPLETED | OUTPATIENT
Start: 2019-03-21 | End: 2019-03-21

## 2019-03-21 RX ADMIN — POLYETHYLENE GLYCOL 3350 17 GRAM(S): 17 POWDER, FOR SOLUTION ORAL at 06:02

## 2019-03-21 RX ADMIN — POLYETHYLENE GLYCOL 3350 17 GRAM(S): 17 POWDER, FOR SOLUTION ORAL at 17:35

## 2019-03-21 RX ADMIN — GABAPENTIN 100 MILLIGRAM(S): 400 CAPSULE ORAL at 06:03

## 2019-03-21 RX ADMIN — Medication 50 GRAM(S): at 14:09

## 2019-03-21 RX ADMIN — CELECOXIB 100 MILLIGRAM(S): 200 CAPSULE ORAL at 06:03

## 2019-03-21 RX ADMIN — Medication 650 MILLIGRAM(S): at 17:42

## 2019-03-21 RX ADMIN — ANASTROZOLE 1 MILLIGRAM(S): 1 TABLET ORAL at 13:08

## 2019-03-21 RX ADMIN — Medication 1 TABLET(S): at 13:10

## 2019-03-21 RX ADMIN — MORPHINE SULFATE 2 MILLIGRAM(S): 50 CAPSULE, EXTENDED RELEASE ORAL at 04:10

## 2019-03-21 RX ADMIN — Medication 650 MILLIGRAM(S): at 06:04

## 2019-03-21 RX ADMIN — Medication 100 MILLIGRAM(S): at 13:10

## 2019-03-21 RX ADMIN — CELECOXIB 100 MILLIGRAM(S): 200 CAPSULE ORAL at 17:34

## 2019-03-21 RX ADMIN — CELECOXIB 100 MILLIGRAM(S): 200 CAPSULE ORAL at 06:04

## 2019-03-21 RX ADMIN — GABAPENTIN 100 MILLIGRAM(S): 400 CAPSULE ORAL at 13:10

## 2019-03-21 RX ADMIN — SODIUM CHLORIDE 500 MILLILITER(S): 9 INJECTION INTRAMUSCULAR; INTRAVENOUS; SUBCUTANEOUS at 23:32

## 2019-03-21 RX ADMIN — Medication 650 MILLIGRAM(S): at 13:05

## 2019-03-21 RX ADMIN — AMLODIPINE BESYLATE 5 MILLIGRAM(S): 2.5 TABLET ORAL at 06:03

## 2019-03-21 RX ADMIN — MORPHINE SULFATE 2 MILLIGRAM(S): 50 CAPSULE, EXTENDED RELEASE ORAL at 03:54

## 2019-03-21 RX ADMIN — Medication 650 MILLIGRAM(S): at 17:35

## 2019-03-21 RX ADMIN — Medication 650 MILLIGRAM(S): at 06:03

## 2019-03-21 RX ADMIN — PANTOPRAZOLE SODIUM 40 MILLIGRAM(S): 20 TABLET, DELAYED RELEASE ORAL at 06:03

## 2019-03-21 RX ADMIN — Medication 100 MILLIGRAM(S): at 06:04

## 2019-03-21 RX ADMIN — ENOXAPARIN SODIUM 30 MILLIGRAM(S): 100 INJECTION SUBCUTANEOUS at 17:35

## 2019-03-21 RX ADMIN — CELECOXIB 100 MILLIGRAM(S): 200 CAPSULE ORAL at 17:43

## 2019-03-21 RX ADMIN — CHLORHEXIDINE GLUCONATE 1 APPLICATION(S): 213 SOLUTION TOPICAL at 06:03

## 2019-03-21 RX ADMIN — Medication 81 MILLIGRAM(S): at 17:35

## 2019-03-21 RX ADMIN — Medication 100 MILLIGRAM(S): at 06:03

## 2019-03-21 RX ADMIN — Medication 81 MILLIGRAM(S): at 06:03

## 2019-03-21 NOTE — OCCUPATIONAL THERAPY INITIAL EVALUATION ADULT - ADDITIONAL COMMENTS
pt is confused at times, and difficulty providing accurate information regarding prior level of function, son reports pt most recently utilized a RW and required assistance with ADLs but was independent with use of SC prior to this.

## 2019-03-21 NOTE — OCCUPATIONAL THERAPY INITIAL EVALUATION ADULT - PLANNED THERAPY INTERVENTIONS, OT EVAL
balance training/ROM/transfer training/stretching/ADL retraining/bed mobility training/parent/caregiver training.../strengthening

## 2019-03-21 NOTE — PROGRESS NOTE ADULT - SUBJECTIVE AND OBJECTIVE BOX
78 F POD#1 S/P L SOHA. Comfortable, pain moderately well controlled. No cp or sob. No n/v    Pt s/e  NAD    Vital Signs Last 24 Hrs  T(C): 37.4 (21 Mar 2019 08:07), Max: 37.7 (21 Mar 2019 04:00)  T(F): 99.4 (21 Mar 2019 08:07), Max: 99.9 (21 Mar 2019 04:00)  HR: 86 (21 Mar 2019 08:07) (78 - 99)  BP: 101/56 (21 Mar 2019 08:07) (130/70 - 146/87)  BP(mean): --  RR: 18 (21 Mar 2019 08:07) (15 - 20)  SpO2: 97% (20 Mar 2019 20:00) (92% - 97%)    LLE:  Dressing clean, dry intact  Compartments soft  Motor ehl/fhl intact  SILT   foot wwp                                    9.9    15.03 )-----------( 169      ( 21 Mar 2019 06:44 )             32.0     03-21    144  |  108  |  15  ----------------------------<  131<H>  3.9   |  18  |  1.1    Ca    8.5      21 Mar 2019 06:44  Mg     1.7     03-21    TPro  5.6<L>  /  Alb  3.2<L>  /  TBili  0.5  /  DBili  x   /  AST  32  /  ALT  13  /  AlkPhos  89  03-21      POD#1 s/p L SOHA  - trend hgb   - WBAT  - PT/OT  - Pain control  - OOBTC, IS  - home meds  - DVT ppx chem + SCD  - Ortho following

## 2019-03-21 NOTE — OCCUPATIONAL THERAPY INITIAL EVALUATION ADULT - RANGE OF MOTION EXAMINATION, UPPER EXTREMITY
arthritic changes in BUE, B shoulders WFL (~3/4 ROM) R elbow decreased extension, L elbow WNL R wrist/digits WFL

## 2019-03-21 NOTE — OCCUPATIONAL THERAPY INITIAL EVALUATION ADULT - PERTINENT HX OF CURRENT PROBLEM, REHAB EVAL
pt admitted with difficulty ambulating, scheduled for LTHR in May, however due to increased difficulty ambulating, is now s/p L THR at this time

## 2019-03-21 NOTE — PROGRESS NOTE ADULT - SUBJECTIVE AND OBJECTIVE BOX
Patient is a 78y old  Female who presents with a chief complaint of hip pain (21 Mar 2019 09:28)    Interval events: S/P Left Total Hip Replacement 3/20/19    Today is hosp day 5, POD 1  Patient is resting comfortable  denies pain from  surgery   No other complaints  Plan: PT to Evaluate    PAST MEDICAL & SURGICAL HISTORY:  HTN (hypertension)  Breast CA: S/P surgery and on hormone therapy  Status post knee replacement: B/L  H/O mastectomy, right      MEDICATIONS  (STANDING):  acetaminophen   Tablet .. 650 milliGRAM(s) Oral every 6 hours  amLODIPine   Tablet 5 milliGRAM(s) Oral daily  anastrozole 1 milliGRAM(s) Oral daily  aspirin enteric coated 81 milliGRAM(s) Oral two times a day  celecoxib 100 milliGRAM(s) Oral every 12 hours  chlorhexidine 4% Liquid 1 Application(s) Topical <User Schedule>  docusate sodium 100 milliGRAM(s) Oral three times a day  enoxaparin Injectable 30 milliGRAM(s) SubCutaneous once  gabapentin 100 milliGRAM(s) Oral three times a day  multivitamin 1 Tablet(s) Oral daily  pantoprazole    Tablet 40 milliGRAM(s) Oral before breakfast  polyethylene glycol 3350 17 Gram(s) Oral two times a day  senna 2 Tablet(s) Oral at bedtime  sodium chloride 0.9%. 1000 milliLiter(s) (75 mL/Hr) IV Continuous <Continuous>    MEDICATIONS  (PRN):  morphine  - Injectable 2 milliGRAM(s) IV Push every 3 hours PRN Severe Pain (7 - 10)  ondansetron Injectable 4 milliGRAM(s) IV Push every 6 hours PRN Nausea and/or Vomiting  oxyCODONE    IR 5 milliGRAM(s) Oral every 4 hours PRN Mild Pain (1 - 3)  oxyCODONE    IR 10 milliGRAM(s) Oral every 4 hours PRN Moderate Pain (4 - 6)          Vital Signs Last 24 Hrs  T(C): 37.9 (21 Mar 2019 12:23), Max: 37.9 (21 Mar 2019 12:23)  T(F): 100.3 (21 Mar 2019 12:23), Max: 100.3 (21 Mar 2019 12:23)  HR: 73 (21 Mar 2019 12:23) (73 - 99)  BP: 115/57 (21 Mar 2019 12:23) (13/70 - 146/87)  BP(mean): --  RR: 18 (21 Mar 2019 12:23) (15 - 20)  SpO2: 95% (21 Mar 2019 13:20) (92% - 97%)  CAPILLARY BLOOD GLUCOSE      POCT Blood Glucose.: 82 mg/dL (20 Mar 2019 17:49)  POCT Blood Glucose.: 93 mg/dL (20 Mar 2019 16:57)    I&O's Summary    20 Mar 2019 07:01  -  21 Mar 2019 07:00  --------------------------------------------------------  IN: 100 mL / OUT: 400 mL / NET: -300 mL    21 Mar 2019 07:01  -  21 Mar 2019 13:30  --------------------------------------------------------  IN: 240 mL / OUT: 0 mL / NET: 240 mL        Physical Exam:    -     General : NAD, resting Comfortably in bed    -      Cardiac: S1/S2 appreciated, RRR    -      Pulm: CTA b/l, No adventitious sounds    -      GI: Soft, NT, ND    -      Musculoskeletal: RUE: Large olecranon bursitis, + tingling and numbness, b/l hand thenar atrophy    -      Neuro: AO x 3, non-focal        Labs:                        9.9    15.03 )-----------( 169      ( 21 Mar 2019 06:44 )             32.0             03-21    144  |  108  |  15  ----------------------------<  131<H>  3.9   |  18  |  1.1    Ca    8.5      21 Mar 2019 06:44  Mg     1.7     03-21    TPro  5.6<L>  /  Alb  3.2<L>  /  TBili  0.5  /  DBili  x   /  AST  32  /  ALT  13  /  AlkPhos  89  03-21    LIVER FUNCTIONS - ( 21 Mar 2019 06:44 )  Alb: 3.2 g/dL / Pro: 5.6 g/dL / ALK PHOS: 89 U/L / ALT: 13 U/L / AST: 32 U/L / GGT: x                     Imaging:    ECG: Patient is a 78y old  Female who presents with a chief complaint of hip pain (21 Mar 2019 09:28)    Interval events: S/P Left Total Hip Replacement 3/20/19    Today is hosp day 5, POD 1  Patient is resting comfortable  denies pain from  surgery   No other complaints  Tolerated diet, has a BM yesterday  Plan: PT to Evaluate    PAST MEDICAL & SURGICAL HISTORY:  HTN (hypertension)  Breast CA: S/P surgery and on hormone therapy  Status post knee replacement: B/L  H/O mastectomy, right      MEDICATIONS  (STANDING):  acetaminophen   Tablet .. 650 milliGRAM(s) Oral every 6 hours  amLODIPine   Tablet 5 milliGRAM(s) Oral daily  anastrozole 1 milliGRAM(s) Oral daily  aspirin enteric coated 81 milliGRAM(s) Oral two times a day  celecoxib 100 milliGRAM(s) Oral every 12 hours  chlorhexidine 4% Liquid 1 Application(s) Topical <User Schedule>  docusate sodium 100 milliGRAM(s) Oral three times a day  enoxaparin Injectable 30 milliGRAM(s) SubCutaneous once  gabapentin 100 milliGRAM(s) Oral three times a day  multivitamin 1 Tablet(s) Oral daily  pantoprazole    Tablet 40 milliGRAM(s) Oral before breakfast  polyethylene glycol 3350 17 Gram(s) Oral two times a day  senna 2 Tablet(s) Oral at bedtime  sodium chloride 0.9%. 1000 milliLiter(s) (75 mL/Hr) IV Continuous <Continuous>    MEDICATIONS  (PRN):  morphine  - Injectable 2 milliGRAM(s) IV Push every 3 hours PRN Severe Pain (7 - 10)  ondansetron Injectable 4 milliGRAM(s) IV Push every 6 hours PRN Nausea and/or Vomiting  oxyCODONE    IR 5 milliGRAM(s) Oral every 4 hours PRN Mild Pain (1 - 3)  oxyCODONE    IR 10 milliGRAM(s) Oral every 4 hours PRN Moderate Pain (4 - 6)          Vital Signs Last 24 Hrs  T(C): 37.9 (21 Mar 2019 12:23), Max: 37.9 (21 Mar 2019 12:23)  T(F): 100.3 (21 Mar 2019 12:23), Max: 100.3 (21 Mar 2019 12:23)  HR: 73 (21 Mar 2019 12:23) (73 - 99)  BP: 115/57 (21 Mar 2019 12:23) (13/70 - 146/87)  BP(mean): --  RR: 18 (21 Mar 2019 12:23) (15 - 20)  SpO2: 95% (21 Mar 2019 13:20) (92% - 97%)  CAPILLARY BLOOD GLUCOSE      POCT Blood Glucose.: 82 mg/dL (20 Mar 2019 17:49)  POCT Blood Glucose.: 93 mg/dL (20 Mar 2019 16:57)    I&O's Summary    20 Mar 2019 07:01  -  21 Mar 2019 07:00  --------------------------------------------------------  IN: 100 mL / OUT: 400 mL / NET: -300 mL    21 Mar 2019 07:01  -  21 Mar 2019 13:30  --------------------------------------------------------  IN: 240 mL / OUT: 0 mL / NET: 240 mL        Physical Exam:    -     General : NAD, resting Comfortably in bed    -      Cardiac: S1/S2 appreciated, RRR    -      Pulm: CTA b/l, No adventitious sounds    -      GI: Soft, NT, ND    -      Musculoskeletal: RUE: Large olecranon bursitis, + tingling and numbness, b/l hand thenar atrophy    -      Neuro: AO x 3, non-focal        Labs:                        9.9    15.03 )-----------( 169      ( 21 Mar 2019 06:44 )             32.0             03-21    144  |  108  |  15  ----------------------------<  131<H>  3.9   |  18  |  1.1    Ca    8.5      21 Mar 2019 06:44  Mg     1.7     03-21    TPro  5.6<L>  /  Alb  3.2<L>  /  TBili  0.5  /  DBili  x   /  AST  32  /  ALT  13  /  AlkPhos  89  03-21    LIVER FUNCTIONS - ( 21 Mar 2019 06:44 )  Alb: 3.2 g/dL / Pro: 5.6 g/dL / ALK PHOS: 89 U/L / ALT: 13 U/L / AST: 32 U/L / GGT: x                     Imaging:    ECG:

## 2019-03-21 NOTE — PROGRESS NOTE ADULT - ASSESSMENT
77 yo F PMH HTN, breast cancer (3.5 yr ago s/p R mastectomy, on anaztrozol), OA s/p b/l knee replacement p/w worsening L hip pain for 2 weeks. XR showed severe L hip OA, no acute fx. Hip replacement today    Left hip pain 2/2 severe OA -   -No evidence  of other rheum dz: ESR 45 nl and CRP low  -Prior sx on rt hip and knees showed no other pathology  XR = severe L hip OA, no acute fracture  Sx today  PT/Rehab eval after sx  pain control w/ oral meds - might need IV post-op    # Pre-op Eval  Sx risk - intermed  CXR - NAPD  EKG- NSR w/ PACs; lots of baseline artifact yielding NS ST/T changes; no path q's    # olecranon bursitis rt elbow   X ray shows olecranon bursitis  prior trauma to elbow (explains old fx's)  c/w celebrex 100mg po q12 (started this admission)  no abx needed  f/u w/ ortho  can ice pack as needed  protect elbow w/ pillow    # 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; might have carpal tunnel  hand muscle atrophy  outpt EMG/NCS of b/l upper ext and if + could consider hand sx eval and wrist splints  Protect elbows and raise elbows off armrests (so pt is not leaning on ulnar nerves)    # Anemia, normocytic (borderline high MCV)  TSH, B12, Fol, Retic - nl  Ferritin 268; no iron def  An of Chr Dz    # HTN - c/w Norvasc    # Breast ca s/p mastectomy - in remission  c/w Anastrazole - can cont through sx    # DVT ppx: Lovenox subQ (hold for OR); will d/w sx team about ppx after THR    # Activity: OOBTC as tolerated    # Code status: FULL    Dispo: Left hip sx today. [facility] for STR after POD 3 77 yo F PMH HTN, breast cancer (3.5 yr ago s/p R mastectomy, on anaztrozol), OA s/p b/l knee replacement p/w worsening L hip pain for 2 weeks. XR showed severe L hip OA, no acute fx.     Left hip pain 2/2 severe OA   -No evidence of other rheum disease : ESR 45 nl and CRP low  -Left Hip XR show  severe L hip OA, no acute fracture  -S/P Left Total Hip Repair 3/20/19  -denies any pain, pain regimen available  -Plan: STR, Pt to evaluate    Hypomagnesemia: Supplemented 3/21/19    Right Elbow olecranon bursitis    -X - ray shows olecranon bursitis  -Prior trauma to elbow from old fracture  -Celebrex 100mg po q12 was started this admission  -Ice pack as needed, protect elbow w/ pillow    Possible Ulnar nerve entrapment at b/l elbows  -may have carpal tunnel synrome  -B/L Thenar [place] atrophy   -Tinel's and Phalen test are negative  -Out Patient  EMG/NCS of b/l upper ext    Normocytic Anemia likely 2/2 Anemia of Chronic Disease  -Asymptomatic, HgB stable at 9.9  -TSH, B12, Folate, Retic - nl  -Ferritin 268; no iron def      Essential  HTN - Well controlled  - c/w Norvasc    Breast ca s/p mastectomy - in remission  c/w Anastrazole     DVT ppx: Lovenox subQ     GI Ppx: Protonix    Full Code    Dispo: From [facility]  -Pending PT Eval

## 2019-03-22 ENCOUNTER — TRANSCRIPTION ENCOUNTER (OUTPATIENT)
Age: 79
End: 2019-03-22

## 2019-03-22 VITALS — HEART RATE: 95 BPM

## 2019-03-22 LAB
ALBUMIN SERPL ELPH-MCNC: 2.8 G/DL — LOW (ref 3.5–5.2)
ALP SERPL-CCNC: 89 U/L — SIGNIFICANT CHANGE UP (ref 30–115)
ALT FLD-CCNC: 8 U/L — SIGNIFICANT CHANGE UP (ref 0–41)
ANION GAP SERPL CALC-SCNC: 13 MMOL/L — SIGNIFICANT CHANGE UP (ref 7–14)
AST SERPL-CCNC: 31 U/L — SIGNIFICANT CHANGE UP (ref 0–41)
BILIRUB SERPL-MCNC: 0.6 MG/DL — SIGNIFICANT CHANGE UP (ref 0.2–1.2)
BLD GP AB SCN SERPL QL: SIGNIFICANT CHANGE UP
BUN SERPL-MCNC: 21 MG/DL — HIGH (ref 10–20)
CALCIUM SERPL-MCNC: 8.2 MG/DL — LOW (ref 8.5–10.1)
CHLORIDE SERPL-SCNC: 108 MMOL/L — SIGNIFICANT CHANGE UP (ref 98–110)
CO2 SERPL-SCNC: 20 MMOL/L — SIGNIFICANT CHANGE UP (ref 17–32)
CREAT SERPL-MCNC: 1.3 MG/DL — SIGNIFICANT CHANGE UP (ref 0.7–1.5)
GLUCOSE SERPL-MCNC: 89 MG/DL — SIGNIFICANT CHANGE UP (ref 70–99)
HCT VFR BLD CALC: 28.9 % — LOW (ref 37–47)
HGB BLD-MCNC: 8.9 G/DL — LOW (ref 12–16)
MAGNESIUM SERPL-MCNC: 2.2 MG/DL — SIGNIFICANT CHANGE UP (ref 1.8–2.4)
MCHC RBC-ENTMCNC: 29.4 PG — SIGNIFICANT CHANGE UP (ref 27–31)
MCHC RBC-ENTMCNC: 30.8 G/DL — LOW (ref 32–37)
MCV RBC AUTO: 95.4 FL — SIGNIFICANT CHANGE UP (ref 81–99)
NRBC # BLD: 0 /100 WBCS — SIGNIFICANT CHANGE UP (ref 0–0)
PLATELET # BLD AUTO: 135 K/UL — SIGNIFICANT CHANGE UP (ref 130–400)
POTASSIUM SERPL-MCNC: 3.8 MMOL/L — SIGNIFICANT CHANGE UP (ref 3.5–5)
POTASSIUM SERPL-SCNC: 3.8 MMOL/L — SIGNIFICANT CHANGE UP (ref 3.5–5)
PROT SERPL-MCNC: 5.1 G/DL — LOW (ref 6–8)
RBC # BLD: 3.03 M/UL — LOW (ref 4.2–5.4)
RBC # FLD: 14.6 % — HIGH (ref 11.5–14.5)
SODIUM SERPL-SCNC: 141 MMOL/L — SIGNIFICANT CHANGE UP (ref 135–146)
TYPE + AB SCN PNL BLD: SIGNIFICANT CHANGE UP
WBC # BLD: 13.65 K/UL — HIGH (ref 4.8–10.8)
WBC # FLD AUTO: 13.65 K/UL — HIGH (ref 4.8–10.8)

## 2019-03-22 RX ORDER — CELECOXIB 200 MG/1
1 CAPSULE ORAL
Qty: 0 | Refills: 0 | DISCHARGE
Start: 2019-03-22

## 2019-03-22 RX ORDER — ACETAMINOPHEN 500 MG
2 TABLET ORAL
Qty: 0 | Refills: 0 | DISCHARGE
Start: 2019-03-22

## 2019-03-22 RX ORDER — SENNA PLUS 8.6 MG/1
2 TABLET ORAL
Qty: 0 | Refills: 0 | DISCHARGE
Start: 2019-03-22

## 2019-03-22 RX ORDER — GABAPENTIN 400 MG/1
1 CAPSULE ORAL
Qty: 0 | Refills: 0 | DISCHARGE
Start: 2019-03-22

## 2019-03-22 RX ORDER — DOCUSATE SODIUM 100 MG
1 CAPSULE ORAL
Qty: 0 | Refills: 0 | DISCHARGE
Start: 2019-03-22

## 2019-03-22 RX ORDER — ASPIRIN/CALCIUM CARB/MAGNESIUM 324 MG
1 TABLET ORAL
Qty: 0 | Refills: 0 | DISCHARGE
Start: 2019-03-22

## 2019-03-22 RX ADMIN — ANASTROZOLE 1 MILLIGRAM(S): 1 TABLET ORAL at 11:51

## 2019-03-22 RX ADMIN — Medication 100 MILLIGRAM(S): at 05:44

## 2019-03-22 RX ADMIN — Medication 650 MILLIGRAM(S): at 05:43

## 2019-03-22 RX ADMIN — SODIUM CHLORIDE 60 MILLILITER(S): 9 INJECTION INTRAMUSCULAR; INTRAVENOUS; SUBCUTANEOUS at 10:55

## 2019-03-22 RX ADMIN — GABAPENTIN 100 MILLIGRAM(S): 400 CAPSULE ORAL at 14:33

## 2019-03-22 RX ADMIN — CHLORHEXIDINE GLUCONATE 1 APPLICATION(S): 213 SOLUTION TOPICAL at 05:44

## 2019-03-22 RX ADMIN — POLYETHYLENE GLYCOL 3350 17 GRAM(S): 17 POWDER, FOR SOLUTION ORAL at 05:44

## 2019-03-22 RX ADMIN — PANTOPRAZOLE SODIUM 40 MILLIGRAM(S): 20 TABLET, DELAYED RELEASE ORAL at 09:22

## 2019-03-22 RX ADMIN — Medication 81 MILLIGRAM(S): at 05:43

## 2019-03-22 RX ADMIN — CELECOXIB 100 MILLIGRAM(S): 200 CAPSULE ORAL at 05:43

## 2019-03-22 RX ADMIN — Medication 650 MILLIGRAM(S): at 11:51

## 2019-03-22 RX ADMIN — Medication 1 TABLET(S): at 11:51

## 2019-03-22 RX ADMIN — GABAPENTIN 100 MILLIGRAM(S): 400 CAPSULE ORAL at 05:43

## 2019-03-22 RX ADMIN — Medication 100 MILLIGRAM(S): at 14:33

## 2019-03-22 NOTE — DISCHARGE NOTE PROVIDER - CARE PROVIDER_API CALL
Samuel Sun)  Neurology; Neuromuscular Medicine  [address] [place] [address], [address]  [place], [place] [id number]  Phone: [phone]  Fax: [phone]  Follow Up Time:     James Warren)  Orthopaedic Surgery  [address] [place] [address]  Phone: [phone]  Fax: [phone]  Follow Up Time:

## 2019-03-22 NOTE — PROGRESS NOTE ADULT - ASSESSMENT
79 yo F PMH HTN, breast cancer (3.5 yr ago s/p R mastectomy, on anaztrozol), OA s/p b/l knee replacement p/w worsening L hip pain for 2 weeks. XR showed severe L hip OA, no acute fx.     Left hip pain 2/2 severe OA   -No evidence of other rheum disease : ESR 45 nl and CRP low  -Left Hip XR show  severe L hip OA, no acute fracture  -S/P Left Total Hip Repair 3/20/19  -denies any pain, pain regimen available  -Plan: STR    Post -OP Low grade fever with Leukocytosis: likely reactive: Improving  -Chest XR: unremarkable, F/up UA     Hypomagnesemia: Supplemented 3/21/19    Right Elbow olecranon bursitis    -X - ray shows olecranon bursitis  -Prior trauma to elbow from old fracture  -Celebrex 100mg po q12 was started this admission  -Ice pack as needed, protect elbow w/ pillow    Possible Ulnar nerve entrapment at b/l elbows  -may have carpal tunnel synrome  -B/L Thenar [place] atrophy   -Tinel's and Phalen test are negative  -Out Patient  EMG/NCS of b/l upper ext    Normocytic Anemia likely 2/2 Anemia of Chronic Disease  -Asymptomatic, HgB stable at 8.9  -TSH, B12, Folate, Retic - nl  -Ferritin 268; no iron def      Essential  HTN - Well controlled  - c/w Norvasc    Breast ca s/p mastectomy - in remission  c/w Anastrazole     DVT ppx: Lovenox subQ     GI Ppx: Protonix    Full Code    Dispo: From [facility]  -STR

## 2019-03-22 NOTE — DISCHARGE NOTE NURSING/CASE MANAGEMENT/SOCIAL WORK - NSDCDPATPORTLINK_GEN_ALL_CORE
You can access the [email or web address] Patient Portal, offered by [facility], by registering with the following website: [email or web address]

## 2019-03-22 NOTE — DIETITIAN INITIAL EVALUATION ADULT. - ENERGY NEEDS
estimated calorie needs: 1389 - 1508 kcals/day (MSJ x 1.2-1.3 AF)  estimated protein needs: 68 - 82 gm/day (1.0 - 1.2 gm/kg CBW)  estimated fluid needs: 1ml/kcal or per LIP

## 2019-03-22 NOTE — PROVIDER CONTACT NOTE (OTHER) - SITUATION
pt. b/p low on q4 vitals. 02 sat 92% RA. pt. refusing all oral meds stating just wants to sleep " get out of my room". refusing 02 to be place despite education & encouragement.
during AM VS pt's BP was 85/61 electronically, I checked BP manually and it was 96/52. pt is on NS @60 cc/hr

## 2019-03-22 NOTE — PROGRESS NOTE ADULT - REASON FOR ADMISSION
hip pain

## 2019-03-22 NOTE — PROGRESS NOTE ADULT - SUBJECTIVE AND OBJECTIVE BOX
Patient is a 78y old  Female who presents with a chief complaint of hip pain (22 Mar 2019 08:17)    Interval events: Low grade fever, Sepsis work-up sent     Today is hosp day 6, POD 2  Patient is resting comfortable  denies pain from  surgery   No other complaints  Tolerating diet with regular BM  Plan: discharge to rehab      - Evaluated by PT      PAST MEDICAL & SURGICAL HISTORY:  HTN (hypertension)  Breast CA: S/P surgery and on hormone therapy  Status post knee replacement: B/L  H/O mastectomy, right      MEDICATIONS  (STANDING):  acetaminophen   Tablet .. 650 milliGRAM(s) Oral every 6 hours  amLODIPine   Tablet 5 milliGRAM(s) Oral daily  anastrozole 1 milliGRAM(s) Oral daily  aspirin enteric coated 81 milliGRAM(s) Oral two times a day  celecoxib 100 milliGRAM(s) Oral every 12 hours  chlorhexidine 4% Liquid 1 Application(s) Topical <User Schedule>  docusate sodium 100 milliGRAM(s) Oral three times a day  gabapentin 100 milliGRAM(s) Oral three times a day  multivitamin 1 Tablet(s) Oral daily  pantoprazole    Tablet 40 milliGRAM(s) Oral before breakfast  senna 2 Tablet(s) Oral at bedtime  sodium chloride 0.9%. 1000 milliLiter(s) (60 mL/Hr) IV Continuous <Continuous>    MEDICATIONS  (PRN):  morphine  - Injectable 2 milliGRAM(s) IV Push every 3 hours PRN Severe Pain (7 - 10)  ondansetron Injectable 4 milliGRAM(s) IV Push every 6 hours PRN Nausea and/or Vomiting  oxyCODONE    IR 5 milliGRAM(s) Oral every 4 hours PRN Mild Pain (1 - 3)  oxyCODONE    IR 10 milliGRAM(s) Oral every 4 hours PRN Moderate Pain (4 - 6)          Vital Signs Last 24 Hrs  T(C): 36.8 (22 Mar 2019 07:47), Max: 37.9 (21 Mar 2019 12:23)  T(F): 98.2 (22 Mar 2019 07:47), Max: 100.3 (21 Mar 2019 12:23)  HR: 74 (22 Mar 2019 07:47) (60 - 95)  BP: 85/67 (22 Mar 2019 07:47) (85/67 - 115/57)  BP(mean): --  RR: 18 (22 Mar 2019 07:47) (18 - 19)  SpO2: 97% (21 Mar 2019 21:30) (92% - 97%)  CAPILLARY BLOOD GLUCOSE        I&O's Summary    21 Mar 2019 07:01  -  22 Mar 2019 07:00  --------------------------------------------------------  IN: 975 mL / OUT: 0 mL / NET: 975 mL        Physical Exam:    -       General : NAD, resting Comfortably in bed    -      Cardiac: S1/S2 appreciated, RRR    -      Pulm: CTA b/l, No adventitious sounds    -      GI: Soft, NT, ND    -      Musculoskeletal: RUE: Large olecranon bursitis, + tingling and numbness, b/l hand thenar atrophy    -      Neuro: AO x 3, non-focal        Labs:                        8.9    13.65 )-----------( 135      ( 22 Mar 2019 05:36 )             28.9             03-22    141  |  108  |  21<H>  ----------------------------<  89  3.8   |  20  |  1.3    Ca    8.2<L>      22 Mar 2019 05:36  Mg     2.2     03-22    TPro  5.1<L>  /  Alb  2.8<L>  /  TBili  0.6  /  DBili  x   /  AST  31  /  ALT  8   /  AlkPhos  89  03-22    LIVER FUNCTIONS - ( 22 Mar 2019 05:36 )  Alb: 2.8 g/dL / Pro: 5.1 g/dL / ALK PHOS: 89 U/L / ALT: 8 U/L / AST: 31 U/L / GGT: x                               Imaging:    ECG:

## 2019-03-22 NOTE — DIETITIAN INITIAL EVALUATION ADULT. - SOURCE
Pt reports she is eating well. however documented po intake 25-50% per EMR. pt says she is eating more than half her meal tray. Last documented BM 3/19. No issue chewing/swallowing. NKFA. Pt denies need for med food supplements. ./patient Pt reports she is eating well. however documented po intake 25-50% per EMR. pt says she is eating more than half her meal tray. Last documented BM 3/19. No issue chewing/swallowing. NKFA. Pt denies need for med food supplements. BMI 25, IBW 56.8 kg./patient

## 2019-03-22 NOTE — DIETITIAN INITIAL EVALUATION ADULT. - OTHER INFO
Reason for RD assessment: LOS. Pt adm for hip pain. Pt is POD#2 for L total hip repair. Post -OP Low grade fever with Leukocytosis: likely reactive: Improving. hypomagnesia repleted. Normocytic Anemia likely 2/2 Anemia of Chronic Disease Hgb stable. Breast ca s/p mastectomy - in remission. Anticipating d/c today to SNF.

## 2019-03-22 NOTE — DISCHARGE NOTE PROVIDER - CARE PROVIDERS DIRECT ADDRESSES
[email or web address]

## 2019-03-22 NOTE — DISCHARGE NOTE PROVIDER - NSDCCPCAREPLAN_GEN_ALL_CORE_FT
PRINCIPAL DISCHARGE DIAGNOSIS  Diagnosis: Hip pain  Assessment and Plan of Treatment: Due to worsering left hip pain, your scheduled surgery had to be done earlier, The orthopedics perfomed surgery on you: left Total hip replacement, there was no acute complications. We will be ransfering you to a rehab facility to help you with ambulation. Follow-up with Orthopedic doctor in 1-2 weeks      SECONDARY DISCHARGE DIAGNOSES  Diagnosis: Ulnar nerve compression  Assessment and Plan of Treatment: Possible B/L Ulnar Nerve compression, please follow-up with neurology for EMG studies in 1-2 weeks    Diagnosis: Olecranon bursitis  Assessment and Plan of Treatment: Right olecranon bursistis, continue to take celebrex as prescribed for the inflamation , follow-up with PMD for Check up

## 2019-03-22 NOTE — PROGRESS NOTE ADULT - SUBJECTIVE AND OBJECTIVE BOX
ORTHO PROGRESS NOTE       78yFemale POD # 2      S/P left Total Hip Arthroplasty     Patient seen and examined at bedside . The patient is awake and alert in NAD. No complaints of chest pain, SOB, N/V.    Vital Signs Last 24 Hrs  T(C): 36.8 (22 Mar 2019 07:47), Max: 37.9 (21 Mar 2019 12:23)  T(F): 98.2 (22 Mar 2019 07:47), Max: 100.3 (21 Mar 2019 12:23)  HR: 74 (22 Mar 2019 07:47) (60 - 95)  BP: 85/67 (22 Mar 2019 07:47) (85/67 - 115/57)  BP(mean): --  RR: 18 (22 Mar 2019 07:47) (18 - 19)  SpO2: 97% (21 Mar 2019 21:30) (92% - 97%)                          8.9    13.65 )-----------( 135      ( 22 Mar 2019 05:36 )             28.9     03-22    141  |  108  |  21<H>  ----------------------------<  89  3.8   |  20  |  1.3    Ca    8.2<L>      22 Mar 2019 05:36  Mg     2.2     03-22    TPro  5.1<L>  /  Alb  2.8<L>  /  TBili  0.6  /  DBili  x   /  AST  31  /  ALT  8   /  AlkPhos  89  03-22          DVT ppx : aspirin     MEDICATIONS  (STANDING):  acetaminophen   Tablet .. 650 milliGRAM(s) Oral every 6 hours  amLODIPine   Tablet 5 milliGRAM(s) Oral daily  anastrozole 1 milliGRAM(s) Oral daily  aspirin enteric coated 81 milliGRAM(s) Oral two times a day  celecoxib 100 milliGRAM(s) Oral every 12 hours  chlorhexidine 4% Liquid 1 Application(s) Topical <User Schedule>  docusate sodium 100 milliGRAM(s) Oral three times a day  gabapentin 100 milliGRAM(s) Oral three times a day  multivitamin 1 Tablet(s) Oral daily  pantoprazole    Tablet 40 milliGRAM(s) Oral before breakfast  senna 2 Tablet(s) Oral at bedtime  sodium chloride 0.9%. 1000 milliLiter(s) (60 mL/Hr) IV Continuous <Continuous>    MEDICATIONS  (PRN):  morphine  - Injectable 2 milliGRAM(s) IV Push every 3 hours PRN Severe Pain (7 - 10)  ondansetron Injectable 4 milliGRAM(s) IV Push every 6 hours PRN Nausea and/or Vomiting  oxyCODONE    IR 5 milliGRAM(s) Oral every 4 hours PRN Mild Pain (1 - 3)  oxyCODONE    IR 10 milliGRAM(s) Oral every 4 hours PRN Moderate Pain (4 - 6)      PE:   left hip dressing C/D/I          Compartments soft         NVI, SILT           A/P: 78yFemale POD #2    S/P left Total Hip Arthroplasty             OOB to Chair            Physical Therapy           Pain control            Incentive Spirometry            DVT Prophylaxis            Discharge planning

## 2019-03-22 NOTE — DISCHARGE NOTE PROVIDER - HOSPITAL COURSE
77 yo F PMH HTN, breast cancer (3.5 yr ago s/p R mastectomy, on anaztrozol), OA s/p b/l knee replacement p/w worsening L hip pain for 2 weeks. XR showed severe L hip OA, no acute fx.     Left hip pain 2/2 severe OA     -No evidence of other rheum disease : ESR 45 nl and CRP low    -Left Hip XR show  severe L hip OA, no acute fracture    -S/P Left Total Hip Repair 3/20/19    D/C to STR        Right Elbow olecranon bursitis      -X - ray shows olecranon bursitis    -Prior trauma to elbow from old fracture    -Celebrex 100mg po q12 was started this admission, continued on discharge        Possible Ulnar nerve entrapment at b/l elbows    -may have carpal tunnel syndrome    -B/L Thenar [place] atrophy     -Tinel's and Phalen test are negative    -Out Patient  EMG/NCS of b/l upper ext        Stable for discharge

## 2019-03-22 NOTE — PROGRESS NOTE ADULT - ATTENDING COMMENTS
pt seen and examined.  agree with PA exam and plan.    WBAT, OOB, PT, IS, DVT proph, pain control, check H&H,
Agree with resident's note, HPI, PE, assessment and plan.  Pt was seen and examined independently.     Pt states she feels fine. No active complaints.     Vitals reviewed. Remains afebrile since yesterday, BP on low side.     Physical exam:  NAD, in good spirits  HEENT: PERRL  NECK: supple, no JVD  RESP: CTA b/l, no crackles or rhonchi, decreased basilar breath sounds  CVS: S1S2, RRR  GI: abdomen soft NT, ND, umbilical hernia, +BS  Extremities: Right elbow with painless cystic mass, b/l fingers deformation, no sinoviitis, b/l thenar muscle atrophy.  Left hip - post op site with some swelling, no discharge. No calf tenderness b/l.  NEURO: AOx3, no focal deficit  H/L: no enlarged LN noted     Labs:                       8.9    13.65 )-----------( 135      ( 22 Mar 2019 05:36 )             28.9     03-22    141  |  108  |  21<H>  ----------------------------<  89  3.8   |  20  |  1.3    Ca    8.2<L>      22 Mar 2019 05:36  Mg     2.2     03-22    TPro  5.1<L>  /  Alb  2.8<L>  /  TBili  0.6  /  DBili  x   /  AST  31  /  ALT  8   /  AlkPhos  89  03-22    A/P: # L hip severe OA, s/p SOHA on 3/20  - WBAT, out of bed to chair, continue PT  - incentive spirometry encouraged  - pain control  # Low grade fever and leucocytosis after Sx resolved, likely reactive  - CXR basilar atelectasis  - UA, UCx, BCx never collected ?reason  - no ABx  # olecranon bursitis rt elbow   - continue celebrex 100mg po q12   - can ice pack as needed  - protect elbow w/ pillow  - OT eval  # 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; Tinel sign negative  - OP EMG/NCS of b/l upper ext and if + could consider hand sx eval and wrist splints  - protect elbows, kristal rt - use pillow under forearms while seated in chair to raise elbows off armrests (so pt is not leaning on ulnar nerves)  # Anemia, normocytic, likely ACD  # HTN - c/w Norvasc  # Breast ca s/p mastectomy - in remission  c/w Anastrazole - can cont through sx  # DVT ppx: Lovenox subQ     # Activity: OOBTC as tolerated    Pt is clinically stable for rehab today.
Agree with resident's note, HPI, PE, assessment and plan.  Pt was seen and examined independently.     Pt states pain is well controlled. No active complaints.     Vitals reviewed. Low grade fever.    Physical exam:  NAD, not toxic looking  HEENT: PERRL  NECK: supple, no JVD  RESP: CTA b/l, no crackles or rhonchi  CVS: S1S2, RRR  GI: abdomen soft NT, ND  Extremities: Right elbow with painless cystic mass, b/l fingers deformation, no sinoviitis, b/l thenar muscle atrophy.  Left hip - post op site with some swelling, no discharge. No calf tenderness b/l.  NEURO: AOx3, no focal deficit  H/L: no enlarged LN noted     Labs reviewed, WBC Count: 15.03 << 8.9  Hb 9.9    A/P: # L hip severe OA, s/p SOHA on 3/20  - WBAT, out of bed to chair, PT  - incentive spirometry  - pain control  # Low grade fever and leucocytosis aftre Sx, might be reactive, but will send work up - UA, CXR, BCx  - no ABx unless work up posiitve  # olecranon bursitis rt elbow   - continue celebrex 100mg po q12   - can ice pack as needed  - protect elbow w/ pillow  - OT eval  # 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; Tinel sign negative  - OP EMG/NCS of b/l upper ext and if + could consider hand sx eval and wrist splints  - protect elbows, kristal rt - use pillow under forearms while seated in chair to raise elbows off armrests (so pt is not leaning on ulnar nerves)  # Anemia, normocytic, likely ACD  # HTN - c/w Norvasc  # Breast ca s/p mastectomy - in remission  c/w Anastrazole - can cont through sx  # DVT ppx: Lovenox subQ     # Activity: OOBTC as tolerated    # Code status: FULL    #Progress Note Handoff  Pending  Consults: PT/OT  Tests: fever work up  Test Results:  Family Discussion: family agreed with plan  Future Disposition: SNF vs. [facility]

## 2019-03-23 ENCOUNTER — OUTPATIENT (OUTPATIENT)
Dept: OUTPATIENT SERVICES | Facility: HOSPITAL | Age: 79
LOS: 1 days | Discharge: HOME | End: 2019-03-23

## 2019-03-23 DIAGNOSIS — Z96.659 PRESENCE OF UNSPECIFIED ARTIFICIAL KNEE JOINT: Chronic | ICD-10-CM

## 2019-03-23 DIAGNOSIS — Z90.11 ACQUIRED ABSENCE OF RIGHT BREAST AND NIPPLE: Chronic | ICD-10-CM

## 2019-03-25 DIAGNOSIS — Z11.1 ENCOUNTER FOR SCREENING FOR RESPIRATORY TUBERCULOSIS: ICD-10-CM

## 2019-03-26 ENCOUNTER — OUTPATIENT (OUTPATIENT)
Dept: OUTPATIENT SERVICES | Facility: HOSPITAL | Age: 79
LOS: 1 days | Discharge: HOME | End: 2019-03-26

## 2019-03-26 DIAGNOSIS — Z96.659 PRESENCE OF UNSPECIFIED ARTIFICIAL KNEE JOINT: Chronic | ICD-10-CM

## 2019-03-26 DIAGNOSIS — Z90.11 ACQUIRED ABSENCE OF RIGHT BREAST AND NIPPLE: Chronic | ICD-10-CM

## 2019-03-27 DIAGNOSIS — R79.9 ABNORMAL FINDING OF BLOOD CHEMISTRY, UNSPECIFIED: ICD-10-CM

## 2019-03-28 DIAGNOSIS — G56.23 LESION OF ULNAR NERVE, BILATERAL UPPER LIMBS: ICD-10-CM

## 2019-03-28 DIAGNOSIS — M70.21 OLECRANON BURSITIS, RIGHT ELBOW: ICD-10-CM

## 2019-03-28 DIAGNOSIS — D63.8 ANEMIA IN OTHER CHRONIC DISEASES CLASSIFIED ELSEWHERE: ICD-10-CM

## 2019-03-28 DIAGNOSIS — Z85.3 PERSONAL HISTORY OF MALIGNANT NEOPLASM OF BREAST: ICD-10-CM

## 2019-03-28 DIAGNOSIS — D72.829 ELEVATED WHITE BLOOD CELL COUNT, UNSPECIFIED: ICD-10-CM

## 2019-03-28 DIAGNOSIS — I10 ESSENTIAL (PRIMARY) HYPERTENSION: ICD-10-CM

## 2019-03-28 DIAGNOSIS — M16.12 UNILATERAL PRIMARY OSTEOARTHRITIS, LEFT HIP: ICD-10-CM

## 2019-03-28 DIAGNOSIS — Z96.653 PRESENCE OF ARTIFICIAL KNEE JOINT, BILATERAL: ICD-10-CM

## 2019-03-28 DIAGNOSIS — E83.42 HYPOMAGNESEMIA: ICD-10-CM

## 2019-03-28 DIAGNOSIS — Z90.11 ACQUIRED ABSENCE OF RIGHT BREAST AND NIPPLE: ICD-10-CM

## 2019-03-28 DIAGNOSIS — K59.00 CONSTIPATION, UNSPECIFIED: ICD-10-CM

## 2019-03-28 DIAGNOSIS — R50.9 FEVER, UNSPECIFIED: ICD-10-CM

## 2019-04-01 ENCOUNTER — OUTPATIENT (OUTPATIENT)
Dept: OUTPATIENT SERVICES | Facility: HOSPITAL | Age: 79
LOS: 1 days | Discharge: HOME | End: 2019-04-01

## 2019-04-01 DIAGNOSIS — Z90.11 ACQUIRED ABSENCE OF RIGHT BREAST AND NIPPLE: Chronic | ICD-10-CM

## 2019-04-01 DIAGNOSIS — Z96.659 PRESENCE OF UNSPECIFIED ARTIFICIAL KNEE JOINT: Chronic | ICD-10-CM

## 2019-04-01 DIAGNOSIS — I10 ESSENTIAL (PRIMARY) HYPERTENSION: ICD-10-CM

## 2019-04-18 ENCOUNTER — APPOINTMENT (OUTPATIENT)
Dept: SURGERY | Facility: CLINIC | Age: 79
End: 2019-04-18

## 2019-09-16 NOTE — PROGRESS NOTE ADULT - PROBLEM/PLAN-3
At the last visit Dr Zohreh Staton wanted patient to have a CMP and he did not place the order  She wanted to know if you could place an order for it today  Pt can be reached at [phone] 
Pt wants this lab placed today 
Task completed
DISPLAY PLAN FREE TEXT
DISPLAY PLAN FREE TEXT

## 2019-10-12 ENCOUNTER — OUTPATIENT (OUTPATIENT)
Dept: OUTPATIENT SERVICES | Facility: HOSPITAL | Age: 79
LOS: 1 days | Discharge: HOME | End: 2019-10-12

## 2019-10-12 DIAGNOSIS — Z90.11 ACQUIRED ABSENCE OF RIGHT BREAST AND NIPPLE: Chronic | ICD-10-CM

## 2019-10-12 DIAGNOSIS — Z96.659 PRESENCE OF UNSPECIFIED ARTIFICIAL KNEE JOINT: Chronic | ICD-10-CM

## 2019-10-12 DIAGNOSIS — C50.411 MALIGNANT NEOPLASM OF UPPER-OUTER QUADRANT OF RIGHT FEMALE BREAST: ICD-10-CM

## 2021-02-05 NOTE — PHYSICAL THERAPY INITIAL EVALUATION ADULT - TRANSFER SAFETY CONCERNS NOTED: SIT/STAND, REHAB EVAL
[facility]   pt unable to lean forward to assist with transfer, kept throwing trunk into extension

## 2021-03-07 NOTE — ED ADULT TRIAGE NOTE - HEART RATE (BEATS/MIN)
Pt to and from CT. Back to room IV infiltrated in left arm. Has edema in left upper arm. Warm pack applied in CT.  New piv established using US   94

## 2021-06-16 ENCOUNTER — APPOINTMENT (OUTPATIENT)
Dept: GERIATRICS | Facility: CLINIC | Age: 81
End: 2021-06-16

## 2021-09-10 NOTE — PROVIDER CONTACT NOTE (OTHER) - RECOMMENDATIONS
[FreeTextEntry1] : Motor: strength 5/5 symmetric\par Reflexes: 2+ symmetric\par Gait: antalgic, slow, not ataxic\par MSK: pain with flexion, extension, and side bed b/l
MD calderon came to pt. bedside spoke with pt. regarding matter. bolus to be given and pt. let MD calderon put 3lNC on. will continue to monitor. pt. asymptomatic.

## 2022-01-13 NOTE — DIETITIAN INITIAL EVALUATION ADULT. - NS FNS WEIGHT USED FOR CALC
Addended by: Eulogio Goldberg on: 1/13/2022 03:34 PM     Modules accepted: Level of Service 68 kg/admission

## 2022-03-27 ENCOUNTER — TRANSCRIPTION ENCOUNTER (OUTPATIENT)
Age: 82
End: 2022-03-27

## 2022-03-27 ENCOUNTER — INPATIENT (INPATIENT)
Facility: HOSPITAL | Age: 82
LOS: 3 days | Discharge: NURSING, REHAB OR LONG-TERM CARE | End: 2022-03-31
Attending: INTERNAL MEDICINE | Admitting: INTERNAL MEDICINE
Payer: MEDICARE

## 2022-03-27 VITALS
HEART RATE: 91 BPM | DIASTOLIC BLOOD PRESSURE: 93 MMHG | SYSTOLIC BLOOD PRESSURE: 135 MMHG | HEIGHT: 65 IN | OXYGEN SATURATION: 97 % | TEMPERATURE: 99 F | WEIGHT: 179.9 LBS | RESPIRATION RATE: 20 BRPM

## 2022-03-27 DIAGNOSIS — Z90.11 ACQUIRED ABSENCE OF RIGHT BREAST AND NIPPLE: Chronic | ICD-10-CM

## 2022-03-27 DIAGNOSIS — Z96.659 PRESENCE OF UNSPECIFIED ARTIFICIAL KNEE JOINT: Chronic | ICD-10-CM

## 2022-03-27 LAB
ALBUMIN SERPL ELPH-MCNC: 4 G/DL — SIGNIFICANT CHANGE UP (ref 3.5–5.2)
ALP SERPL-CCNC: 111 U/L — SIGNIFICANT CHANGE UP (ref 30–115)
ALT FLD-CCNC: 7 U/L — SIGNIFICANT CHANGE UP (ref 0–41)
ANION GAP SERPL CALC-SCNC: 15 MMOL/L — HIGH (ref 7–14)
APPEARANCE UR: CLEAR — SIGNIFICANT CHANGE UP
AST SERPL-CCNC: 13 U/L — SIGNIFICANT CHANGE UP (ref 0–41)
BACTERIA # UR AUTO: NEGATIVE — SIGNIFICANT CHANGE UP
BASE EXCESS BLDV CALC-SCNC: -2.7 MMOL/L — LOW (ref -2–3)
BASOPHILS # BLD AUTO: 0.05 K/UL — SIGNIFICANT CHANGE UP (ref 0–0.2)
BASOPHILS NFR BLD AUTO: 0.5 % — SIGNIFICANT CHANGE UP (ref 0–1)
BILIRUB SERPL-MCNC: 0.6 MG/DL — SIGNIFICANT CHANGE UP (ref 0.2–1.2)
BILIRUB UR-MCNC: NEGATIVE — SIGNIFICANT CHANGE UP
BUN SERPL-MCNC: 19 MG/DL — SIGNIFICANT CHANGE UP (ref 10–20)
CA-I SERPL-SCNC: 1.24 MMOL/L — SIGNIFICANT CHANGE UP (ref 1.15–1.33)
CALCIUM SERPL-MCNC: 9.1 MG/DL — SIGNIFICANT CHANGE UP (ref 8.5–10.1)
CHLORIDE SERPL-SCNC: 110 MMOL/L — SIGNIFICANT CHANGE UP (ref 98–110)
CO2 SERPL-SCNC: 19 MMOL/L — SIGNIFICANT CHANGE UP (ref 17–32)
COLOR SPEC: SIGNIFICANT CHANGE UP
CREAT SERPL-MCNC: 1.3 MG/DL — SIGNIFICANT CHANGE UP (ref 0.7–1.5)
DIFF PNL FLD: NEGATIVE — SIGNIFICANT CHANGE UP
EGFR: 41 ML/MIN/1.73M2 — LOW
EOSINOPHIL # BLD AUTO: 0.16 K/UL — SIGNIFICANT CHANGE UP (ref 0–0.7)
EOSINOPHIL NFR BLD AUTO: 1.6 % — SIGNIFICANT CHANGE UP (ref 0–8)
EPI CELLS # UR: 1 /HPF — SIGNIFICANT CHANGE UP (ref 0–5)
GAS PNL BLDV: 139 MMOL/L — SIGNIFICANT CHANGE UP (ref 136–145)
GAS PNL BLDV: SIGNIFICANT CHANGE UP
GLUCOSE SERPL-MCNC: 107 MG/DL — HIGH (ref 70–99)
GLUCOSE UR QL: NEGATIVE — SIGNIFICANT CHANGE UP
HCO3 BLDV-SCNC: 23 MMOL/L — SIGNIFICANT CHANGE UP (ref 22–29)
HCT VFR BLD CALC: 34.8 % — LOW (ref 37–47)
HCT VFR BLDA CALC: 35 % — LOW (ref 39–51)
HGB BLD CALC-MCNC: 11.5 G/DL — LOW (ref 12.6–17.4)
HGB BLD-MCNC: 11 G/DL — LOW (ref 12–16)
HYALINE CASTS # UR AUTO: 1 /LPF — SIGNIFICANT CHANGE UP (ref 0–7)
IMM GRANULOCYTES NFR BLD AUTO: 0.5 % — HIGH (ref 0.1–0.3)
KETONES UR-MCNC: NEGATIVE — SIGNIFICANT CHANGE UP
LACTATE BLDV-MCNC: 1.4 MMOL/L — SIGNIFICANT CHANGE UP (ref 0.5–2)
LEUKOCYTE ESTERASE UR-ACNC: NEGATIVE — SIGNIFICANT CHANGE UP
LYMPHOCYTES # BLD AUTO: 1.09 K/UL — LOW (ref 1.2–3.4)
LYMPHOCYTES # BLD AUTO: 11.2 % — LOW (ref 20.5–51.1)
MAGNESIUM SERPL-MCNC: 2 MG/DL — SIGNIFICANT CHANGE UP (ref 1.8–2.4)
MCHC RBC-ENTMCNC: 30 PG — SIGNIFICANT CHANGE UP (ref 27–31)
MCHC RBC-ENTMCNC: 31.6 G/DL — LOW (ref 32–37)
MCV RBC AUTO: 94.8 FL — SIGNIFICANT CHANGE UP (ref 81–99)
MONOCYTES # BLD AUTO: 0.64 K/UL — HIGH (ref 0.1–0.6)
MONOCYTES NFR BLD AUTO: 6.6 % — SIGNIFICANT CHANGE UP (ref 1.7–9.3)
NEUTROPHILS # BLD AUTO: 7.71 K/UL — HIGH (ref 1.4–6.5)
NEUTROPHILS NFR BLD AUTO: 79.6 % — HIGH (ref 42.2–75.2)
NITRITE UR-MCNC: NEGATIVE — SIGNIFICANT CHANGE UP
NRBC # BLD: 0 /100 WBCS — SIGNIFICANT CHANGE UP (ref 0–0)
PCO2 BLDV: 40 MMHG — SIGNIFICANT CHANGE UP (ref 39–42)
PH BLDV: 7.36 — SIGNIFICANT CHANGE UP (ref 7.32–7.43)
PH UR: 6 — SIGNIFICANT CHANGE UP (ref 5–8)
PLATELET # BLD AUTO: 182 K/UL — SIGNIFICANT CHANGE UP (ref 130–400)
PO2 BLDV: 53 MMHG — SIGNIFICANT CHANGE UP
POTASSIUM BLDV-SCNC: 3 MMOL/L — LOW (ref 3.5–5.1)
POTASSIUM SERPL-MCNC: 3.3 MMOL/L — LOW (ref 3.5–5)
POTASSIUM SERPL-SCNC: 3.3 MMOL/L — LOW (ref 3.5–5)
PROT SERPL-MCNC: 6.4 G/DL — SIGNIFICANT CHANGE UP (ref 6–8)
PROT UR-MCNC: ABNORMAL
RBC # BLD: 3.67 M/UL — LOW (ref 4.2–5.4)
RBC # FLD: 14.4 % — SIGNIFICANT CHANGE UP (ref 11.5–14.5)
RBC CASTS # UR COMP ASSIST: 2 /HPF — SIGNIFICANT CHANGE UP (ref 0–4)
SAO2 % BLDV: 85 % — SIGNIFICANT CHANGE UP
SARS-COV-2 RNA SPEC QL NAA+PROBE: SIGNIFICANT CHANGE UP
SODIUM SERPL-SCNC: 144 MMOL/L — SIGNIFICANT CHANGE UP (ref 135–146)
SP GR SPEC: 1.02 — SIGNIFICANT CHANGE UP (ref 1.01–1.03)
TROPONIN T SERPL-MCNC: 0.02 NG/ML — HIGH
UROBILINOGEN FLD QL: SIGNIFICANT CHANGE UP
WBC # BLD: 9.7 K/UL — SIGNIFICANT CHANGE UP (ref 4.8–10.8)
WBC # FLD AUTO: 9.7 K/UL — SIGNIFICANT CHANGE UP (ref 4.8–10.8)
WBC UR QL: 2 /HPF — SIGNIFICANT CHANGE UP (ref 0–5)

## 2022-03-27 PROCEDURE — 70498 CT ANGIOGRAPHY NECK: CPT | Mod: 26,MA

## 2022-03-27 PROCEDURE — 99285 EMERGENCY DEPT VISIT HI MDM: CPT | Mod: GC

## 2022-03-27 PROCEDURE — 99223 1ST HOSP IP/OBS HIGH 75: CPT

## 2022-03-27 PROCEDURE — 93010 ELECTROCARDIOGRAM REPORT: CPT

## 2022-03-27 PROCEDURE — 70496 CT ANGIOGRAPHY HEAD: CPT | Mod: 26,MA

## 2022-03-27 PROCEDURE — 71045 X-RAY EXAM CHEST 1 VIEW: CPT | Mod: 26

## 2022-03-27 PROCEDURE — 70450 CT HEAD/BRAIN W/O DYE: CPT | Mod: 26,MA,59

## 2022-03-27 RX ORDER — ANASTROZOLE 1 MG/1
1 TABLET ORAL DAILY
Refills: 0 | Status: DISCONTINUED | OUTPATIENT
Start: 2022-03-27 | End: 2022-03-31

## 2022-03-27 RX ORDER — SODIUM CHLORIDE 9 MG/ML
500 INJECTION INTRAMUSCULAR; INTRAVENOUS; SUBCUTANEOUS ONCE
Refills: 0 | Status: COMPLETED | OUTPATIENT
Start: 2022-03-27 | End: 2022-03-27

## 2022-03-27 RX ORDER — PANTOPRAZOLE SODIUM 20 MG/1
40 TABLET, DELAYED RELEASE ORAL
Refills: 0 | Status: DISCONTINUED | OUTPATIENT
Start: 2022-03-27 | End: 2022-03-31

## 2022-03-27 RX ORDER — AMLODIPINE BESYLATE 2.5 MG/1
5 TABLET ORAL DAILY
Refills: 0 | Status: DISCONTINUED | OUTPATIENT
Start: 2022-03-27 | End: 2022-03-31

## 2022-03-27 RX ORDER — LANOLIN ALCOHOL/MO/W.PET/CERES
3 CREAM (GRAM) TOPICAL AT BEDTIME
Refills: 0 | Status: DISCONTINUED | OUTPATIENT
Start: 2022-03-27 | End: 2022-03-31

## 2022-03-27 RX ORDER — ENOXAPARIN SODIUM 100 MG/ML
40 INJECTION SUBCUTANEOUS EVERY 24 HOURS
Refills: 0 | Status: DISCONTINUED | OUTPATIENT
Start: 2022-03-27 | End: 2022-03-31

## 2022-03-27 RX ORDER — ANASTROZOLE 1 MG/1
1 TABLET ORAL
Qty: 0 | Refills: 0 | DISCHARGE

## 2022-03-27 RX ORDER — POTASSIUM CHLORIDE 20 MEQ
20 PACKET (EA) ORAL ONCE
Refills: 0 | Status: COMPLETED | OUTPATIENT
Start: 2022-03-27 | End: 2022-03-27

## 2022-03-27 RX ADMIN — SODIUM CHLORIDE 500 MILLILITER(S): 9 INJECTION INTRAMUSCULAR; INTRAVENOUS; SUBCUTANEOUS at 12:45

## 2022-03-27 RX ADMIN — AMLODIPINE BESYLATE 5 MILLIGRAM(S): 2.5 TABLET ORAL at 22:52

## 2022-03-27 RX ADMIN — ANASTROZOLE 1 MILLIGRAM(S): 1 TABLET ORAL at 22:52

## 2022-03-27 RX ADMIN — ENOXAPARIN SODIUM 40 MILLIGRAM(S): 100 INJECTION SUBCUTANEOUS at 22:52

## 2022-03-27 RX ADMIN — Medication 20 MILLIEQUIVALENT(S): at 14:25

## 2022-03-27 NOTE — ED PROVIDER NOTE - CLINICAL SUMMARY MEDICAL DECISION MAKING FREE TEXT BOX
.    81-year-old female past medical history as noted, brought in by family for confusion behavior change x2 days.  Change in mental status was somewhat abrupt, last night, with difficulty forming sentences and repeating herself.    No reported recent illness, fever, trauma, or other new somatic complaints.  ROS, PE as above.  Patient is NAD, somewhat unkempt, follows two-step commands, moves all extremities, AAO x2.  CT head imaging negative for acute process.  Labs EKG reviewed.    IMP: AMS consider dementia vs other metabolic process?  P: admit to medicine for further w/up and management.    .

## 2022-03-27 NOTE — H&P ADULT - ASSESSMENT
82 yo F with pmhx of HTN, R breast CA s/p lumpectomy 6-7 yrs ago, brought in to the ED for AMS for 2 days    #AMS - r/o stroke, seizures vs worsening dementia vs metabolic causes ( hypothyroid vs vitamin def vs syphilis)    - CTH and CTA negative for acute pathologies   - UA negative for UTI  - obtain tsh, rpr, vitamin b12   - obtain routine EEG  - MRI H non con  - obtain nuero consult if MRI / EEG show any abnormality   - PT/OT consult     #HTN   -       # GI PPx - Protonix  # DVT PPx -Lovenox 40 mg SubQ    # Activity -  Increase as Tolerated   # Dispo -   Patient to be discharged when medically optimized.  # Code Status - FULL      80 yo F with pmhx of HTN, R breast CA s/p lumpectomy 6-7 yrs ago, brought in to the ED for AMS for 2 days    #AMS - r/o stroke, seizures vs worsening dementia vs metabolic causes ( hypothyroid vs vitamin def vs syphilis)    - suspecting that this is not an acute decline of mental status and more of worsening of undiagnosed dementia   - CTH and CTA negative for acute pathologies   - UA negative for UTI  - obtain tsh, rpr, vitamin b12   - obtain routine EEG  - MRI H non con  - obtain nuero consult if MRI / EEG show any abnormality   - PT/OT consult     #HTN   - continue amlodipine    #breast cancer in remission  - continue Anastrazole     #hernia s/p hernia repair (2016).  - check ct abdomen.     # GI PPx - Protonix  # DVT PPx -Lovenox 40 mg SubQ    # Activity -  Increase as Tolerated   # Dispo -   Patient to be discharged when medically optimized.  # Code Status - FULL      80 yo F with pmhx of HTN, R breast CA s/p lumpectomy 6-7 yrs ago, brought in to the ED for AMS for 2 days    #AMS - r/o stroke, seizures vs worsening dementia vs metabolic causes ( hypothyroid vs vitamin def vs syphilis)    - suspecting that this is not an acute decline of mental status and more of worsening of undiagnosed dementia   - CTH and CTA negative for acute pathologies   - UA negative for UTI  - obtain tsh, rpr, vitamin b12   - obtain routine EEG  - MRI H non con  - obtain nuero consult if MRI / EEG show any abnormality   - PT/OT consult     #HTN   - continue amlodipine    #breast cancer in remission  - continue Anastrazole     #hernia s/p hernia repair (2016).  - no abdominal distension  - monitor BM   - no signs of obstruction    # GI PPx - Protonix  # DVT PPx -Lovenox 40 mg SubQ    # Activity -  Increase as Tolerated   # Dispo -   Patient to be discharged when medically optimized.  # Code Status - FULL

## 2022-03-27 NOTE — H&P ADULT - NSHPLABSRESULTS_GEN_ALL_CORE
LABS:  cret                        11.0   9.70  )-----------( 182      ( 27 Mar 2022 11:51 )             34.8     03-27    144  |  110  |  19  ----------------------------<  107<H>  3.3<L>   |  19  |  1.3    Ca    9.1      27 Mar 2022 11:51  Mg     2.0     03-27    TPro  6.4  /  Alb  4.0  /  TBili  0.6  /  DBili  x   /  AST  13  /  ALT  7   /  AlkPhos  111  03-27      < from: Xray Chest 1 View- PORTABLE-Urgent (03.27.22 @ 13:33) >    Impression:    No radiographic evidence of acute cardiopulmonary disease.        --- End of Report ---    < end of copied text >    < from: CT Angio Head w/ IV Cont (03.27.22 @ 12:32) >    IMPRESSION:  CTA neck: No stenosis. No dissection.    CTA brain: Mild stenosis involving the intracranial vertebral arteries   bilaterally.    < end of copied text >    < from: CT Angio Neck w/ IV Cont (03.27.22 @ 12:31) >    IMPRESSION:  CTA neck: No stenosis. No dissection.    CTA brain: Mild stenosis involving the intracranial vertebral arteries   bilaterally.    < end of copied text >    < from: CT Head No Cont (03.27.22 @ 12:29) >    IMPRESSION:  No evidence of acute transcortical infarct, acute intracranial   hemorrhage, or mass effect.    < end of copied text >

## 2022-03-27 NOTE — ED PROVIDER NOTE - PROGRESS NOTE DETAILS
Resident AO: ECG, labs, CT head, CXR reviewed. Patient needs admission for further work up of AMS. Signed out to Medicine. Patient's son at bedside aware and agrees with plan.

## 2022-03-27 NOTE — ED ADULT NURSE NOTE - NSIMPLEMENTINTERV_GEN_ALL_ED
Implemented All Fall with Harm Risk Interventions:  [place] to call system. Call bell, personal items and telephone within reach. Instruct patient to call for assistance. Room bathroom lighting operational. Non-slip footwear when patient is off stretcher. Physically safe environment: no spills, clutter or unnecessary equipment. Stretcher in lowest position, wheels locked, appropriate side rails in place. Provide visual cue, wrist band, yellow gown, etc. Monitor gait and stability. Monitor for mental status changes and reorient to person, place, and time. Review medications for side effects contributing to fall risk. Reinforce activity limits and safety measures with patient and family. Provide visual clues: red socks.

## 2022-03-27 NOTE — H&P ADULT - NSHPPHYSICALEXAM_GEN_ALL_CORE
ICU Vital Signs Last 24 Hrs  T(C): 35.8 (27 Mar 2022 17:29), Max: 37.1 (27 Mar 2022 11:12)  T(F): 96.5 (27 Mar 2022 17:29), Max: 98.7 (27 Mar 2022 11:12)  HR: 78 (27 Mar 2022 17:29) (64 - 91)  BP: 134/80 (27 Mar 2022 17:29) (134/80 - 139/89)  RR: 18 (27 Mar 2022 17:29) (18 - 20)  SpO2: 97% (27 Mar 2022 11:12) (97% - 97%)      GENERAL: NAD, lying in bed comfortably  CHEST/LUNG: Clear to auscultation bilaterally; No rales, rhonchi, wheezing, or rubs. Unlabored respirations  HEART: Regular rate and rhythm; No murmurs, rubs, or gallops  ABDOMEN: Bowel sounds present; Soft, Nontender, Nondistended. No hepatomegally  EXTREMITIES:  2+  edema  NERVOUS SYSTEM:  Alert & Oriented X2  MSK: FROM all 4 extremities, full and equal strength  SKIN: No rashes or lesions ICU Vital Signs Last 24 Hrs  T(C): 35.8 (27 Mar 2022 17:29), Max: 37.1 (27 Mar 2022 11:12)  T(F): 96.5 (27 Mar 2022 17:29), Max: 98.7 (27 Mar 2022 11:12)  HR: 78 (27 Mar 2022 17:29) (64 - 91)  BP: 134/80 (27 Mar 2022 17:29) (134/80 - 139/89)  RR: 18 (27 Mar 2022 17:29) (18 - 20)  SpO2: 97% (27 Mar 2022 11:12) (97% - 97%)      GENERAL: NAD, lying in bed comfortably  CHEST/LUNG: Clear to auscultation bilaterally; No rales, rhonchi, wheezing, or rubs. Unlabored respirations  HEART: Regular rate and rhythm; No murmurs, rubs, or gallops  ABDOMEN: soft like mass protruding from abdomen  EXTREMITIES:  no edema  NERVOUS SYSTEM:  Alert & Oriented X2  MSK: FROM all 4 extremities, full and equal strength  SKIN: No rashes or lesions

## 2022-03-27 NOTE — ED PROVIDER NOTE - OBJECTIVE STATEMENT
Pt is an 82 yo F, h/o HTN, R breast CA s/p lumpectomy 6-7 yrs ago, psh of hysterectomy, hernia repair (2016). Brought in by son and daughter for AMS x 2d. Patient resides with daughter Brynn, and had son recently come visit 3d ago from out of town. Last night, they both noted that the patient had a rapid change in mental status, where she seemed confused, had trouble forming sentences, and repeating herself. She particularly kept repeating the last four digits of her SSN without any context. She has improved somewhat since then, but remains altered from her baseline. In the ED, she is AAOx2, but is unsure of year and states that it is summer time. Son and daughter state that is abnormal for her. She typically ambulates with a walker, and takes care of her own hygiene.   Patient states she feels fine, and denies any symptoms. No fever/chills, nasal discharge/sore throat, CP, palpitations, SOB, cough, abd pain, NVD, dysuria, hematuria, new joint pain, FND, rashes, trauma. The family at bedside are not sure if she is a reliable historian even at this time. They state, however, she has not been coughing, or complaining of anything leading up to yesterday.

## 2022-03-27 NOTE — PATIENT PROFILE ADULT - FALL HARM RISK - HARM RISK INTERVENTIONS

## 2022-03-27 NOTE — H&P ADULT - HISTORY OF PRESENT ILLNESS
80 yo F with pmhx of HTN, R breast CA s/p lumpectomy 6-7 yrs ago, brought in to the ED for AMS for 2 days . Patient resides with daughter Brynn, and had son recently come visit 3d ago from out of town. Last night, they both noted that the patient had a rapid change in mental status, where she seemed confused, had trouble forming sentences, and repeating herself. She particularly kept repeating the last four digits of her SSN without any context. She has improved somewhat since then, but remains altered from her baseline. In the ED, she is AAOx2, but is unsure of year and states that it is summer time. Son and daughter state that is abnormal for her. She typically ambulates with a walker, and takes care of her own hygiene.   Patient states she feels fine, and denies any symptoms. No fever/chills, nasal discharge/sore throat, CP, palpitations, SOB, cough, abd pain, NVD, dysuria, hematuria, new joint pain, FND, rashes, trauma. The family at bedside are not sure if she is a reliable historian even at this time. They state, however, she has not been coughing, or complaining of anything leading up to yesterday    In the ED vitals wnls. Labs significant for hypokalemia. Imaging including CTH, CTA and CXR show no acute pathology.  80 yo F with pmhx of HTN, R breast CA s/p lumpectomy 6-7 yrs ago, brought in to the ED for AMS for 2 days . Patient resides with daughter Brynn, and had son recently come visit 3d ago from out of town. Most of the history was obtained from the Son, Dillon who is at bedside. He states that his mother has shown signs of confusion 3 days ago. He noticed that his mother is increasingly fidgety, loses focus when performing her ADL, had trouble forming sentences, and repeating herself. She particularly kept repeating the last four digits of her SSN without any context.    She typically ambulates with a walker, and takes care of her own hygiene. However it is unknown how long this has been going on as per the son as his sister is the one that lives with her.     Son denies any symptoms. No fever/chills, nasal discharge/sore throat, CP, palpitations, SOB, cough, abd pain, NVD, dysuria, hematuria, new joint pain, FND, rashes, trauma.    In the ED vitals wnls. Labs significant for hypokalemia. Imaging including CTH, CTA and CXR show no acute pathology.

## 2022-03-27 NOTE — H&P ADULT - ATTENDING COMMENTS
Patient seen and examined on 2022 at 22:08    HPI: history obtained mainly from chart, she is still confused during my interview.  80 yo woman with a pmhx of HTN, R breast CA s/p lumpectomy 6-7 yrs ago, admitted for confusion/AMS, She was sent in by her sons after being noted to have progressive confusion for the last 3 days. Other than her confusion and lack of focus/coherence no other symptoms were reported. She notes that she was confused in the preceding days though cannot attest to why. While she initially appears lucid further questions unmasks confusion, thinking she has been in the hospital for days, unable to recall some previous details.   At this time she has no acute complaints. She denied any new medications/substances, dysuria, headaches, twitching/shaking. CP, SOB, palpitations, cough, fevers, chills, abdo pain, n/v/d.   UA negative   CTH was negative for acute changes but multiple chronic infarcts noted     REVIEW OF SYSTEMS: she is likely an unreliable historian   CONSTITUTIONAL:  No weakness, fevers, chills, night sweats, weight loss  EYES/ENT: No visual changes. No vertigo or dysphagia  NECK: No neck pain or stiffness  RESPIRATORY: No cough, wheezing, hemoptysis. No shortness of breath  CARDIOVASCULAR: No chest pain or palpitations. No lower extremity edema  GASTROINTESTINAL: No abdominal pain. No nausea, vomiting, diarrhea, or hematemesis  GENITOURINARY: No dysuria or hematuria   NEUROLOGICAL: +confusion. No focal numbness or weakness  SKIN: No rashes or itching  HEMATOLOGIC: No easy bruising or prolonged bleeding.      PHYSICAL EXAM:  GENERAL: NAD, well-developed, Non-toxic, elderly and frail, stated age   HEAD:  Atraumatic, Normocephalic  EYES: EOMI, Sclera White   NECK: Supple, No JVD  CHEST/LUNG: Clear to auscultation bilaterally; No wheezing, rhonchi, or crackles  HEART: Regular rate and irregular rhythm; s1, s2, No murmurs, rubs, or gallops  ABDOMEN: Soft, Nontender, Nondistended; Bowel sounds present, No rebound or guarding noted   EXTREMITIES:   + 1 non-pitting lower extremity edema or calf tenderness to palpation.  No clubbing or cyanosis  Right elblow deformed with non-tender soft tissue mass  PSYCH: AAOx2 (name, , and location correct, date was wrong), pleasant, cooperative, slightly anxious  NEUROLOGY: moves extremities spontaneously. Sensation grossly intact.   SKIN: No rashes or lesions      ASSESSMENT AND PLAN:  Confusion / Altered mental status   Hx of CVA  -Possible underlying dementia (may have progressing vascular dementia given the chronic infarcts noted on CTH)  -Check blood, urine cultures, B12, Folate, RPR, TSH  -Check MRI brain non-con and REEG  -Neuro eval if persistent confusion or if abnormal eeg/MRI   -Start ASA and check lipids   -Further history needs to be gathered about her baseline status from her daughter when she can be reached in the day time     HTN: cont with amlodipine     Hx of Breast cancer: in anastrazole     DVT ppx: Lovenox/Heparin  GI ppx: Not indicated  GOC: Full code for now     My note supersedes the residents in the event of a discrepancy.

## 2022-03-27 NOTE — ED PROVIDER NOTE - PHYSICAL EXAMINATION
_  Vital signs reviewed; ABCs intact  GENERAL: Elderly, not in acute distress  SKIN: Warm, dry  HEAD & NECK: NCAT, supple neck  EYES: EOMI, PER B/L, no scleral icterus, no conjunctival injection, no conjunctival pallor  ENT: MMM; uvula midline; +hypertrophic gingiva; no oropharyngeal erythema or exudates  CARD: RRR, S1, S2; no murmurs, no rubs, no gallops  RESP: Normal respiratory effort, CTAB, no rales, no wheezing  ABD: Soft, distended but non-rigid, +umbilical hernia possibly incarcerated; NT, no rebound, no guarding  EXT: +B/L pitting LE edema; pulses palpable distally; +R calf tenderness; poor hygiene of feet with debris  MSK: Normal tone and bulk, no bony tenderness, full ROM, motor strength 5/5  NEURO: CN II-XII intact; normal cerebellar testing (finger-to-nose, heel-to-shin); no pronator drift  PSYCH: AAOx2, cooperative, pleasant demeanor

## 2022-03-28 LAB
A1C WITH ESTIMATED AVERAGE GLUCOSE RESULT: 5.7 % — HIGH (ref 4–5.6)
ALBUMIN SERPL ELPH-MCNC: 4.1 G/DL — SIGNIFICANT CHANGE UP (ref 3.5–5.2)
ALP SERPL-CCNC: 115 U/L — SIGNIFICANT CHANGE UP (ref 30–115)
ALT FLD-CCNC: 7 U/L — SIGNIFICANT CHANGE UP (ref 0–41)
ANION GAP SERPL CALC-SCNC: 16 MMOL/L — HIGH (ref 7–14)
AST SERPL-CCNC: 15 U/L — SIGNIFICANT CHANGE UP (ref 0–41)
BASOPHILS # BLD AUTO: 0.06 K/UL — SIGNIFICANT CHANGE UP (ref 0–0.2)
BASOPHILS NFR BLD AUTO: 0.4 % — SIGNIFICANT CHANGE UP (ref 0–1)
BILIRUB SERPL-MCNC: 1 MG/DL — SIGNIFICANT CHANGE UP (ref 0.2–1.2)
BUN SERPL-MCNC: 16 MG/DL — SIGNIFICANT CHANGE UP (ref 10–20)
CALCIUM SERPL-MCNC: 9.2 MG/DL — SIGNIFICANT CHANGE UP (ref 8.5–10.1)
CHLORIDE SERPL-SCNC: 106 MMOL/L — SIGNIFICANT CHANGE UP (ref 98–110)
CHOLEST SERPL-MCNC: 203 MG/DL — HIGH
CO2 SERPL-SCNC: 22 MMOL/L — SIGNIFICANT CHANGE UP (ref 17–32)
CREAT SERPL-MCNC: 1.1 MG/DL — SIGNIFICANT CHANGE UP (ref 0.7–1.5)
EGFR: 50 ML/MIN/1.73M2 — LOW
EOSINOPHIL # BLD AUTO: 0.03 K/UL — SIGNIFICANT CHANGE UP (ref 0–0.7)
EOSINOPHIL NFR BLD AUTO: 0.2 % — SIGNIFICANT CHANGE UP (ref 0–8)
ESTIMATED AVERAGE GLUCOSE: 117 MG/DL — HIGH (ref 68–114)
GLUCOSE BLDC GLUCOMTR-MCNC: 120 MG/DL — HIGH (ref 70–99)
GLUCOSE SERPL-MCNC: 113 MG/DL — HIGH (ref 70–99)
HCT VFR BLD CALC: 39 % — SIGNIFICANT CHANGE UP (ref 37–47)
HDLC SERPL-MCNC: 53 MG/DL — SIGNIFICANT CHANGE UP
HGB BLD-MCNC: 12.6 G/DL — SIGNIFICANT CHANGE UP (ref 12–16)
IMM GRANULOCYTES NFR BLD AUTO: 0.6 % — HIGH (ref 0.1–0.3)
LIPID PNL WITH DIRECT LDL SERPL: 132 MG/DL — HIGH
LYMPHOCYTES # BLD AUTO: 0.65 K/UL — LOW (ref 1.2–3.4)
LYMPHOCYTES # BLD AUTO: 4.7 % — LOW (ref 20.5–51.1)
MCHC RBC-ENTMCNC: 30.4 PG — SIGNIFICANT CHANGE UP (ref 27–31)
MCHC RBC-ENTMCNC: 32.3 G/DL — SIGNIFICANT CHANGE UP (ref 32–37)
MCV RBC AUTO: 94.2 FL — SIGNIFICANT CHANGE UP (ref 81–99)
MONOCYTES # BLD AUTO: 0.6 K/UL — SIGNIFICANT CHANGE UP (ref 0.1–0.6)
MONOCYTES NFR BLD AUTO: 4.4 % — SIGNIFICANT CHANGE UP (ref 1.7–9.3)
NEUTROPHILS # BLD AUTO: 12.27 K/UL — HIGH (ref 1.4–6.5)
NEUTROPHILS NFR BLD AUTO: 89.7 % — HIGH (ref 42.2–75.2)
NON HDL CHOLESTEROL: 150 MG/DL — HIGH
NRBC # BLD: 0 /100 WBCS — SIGNIFICANT CHANGE UP (ref 0–0)
PLATELET # BLD AUTO: 205 K/UL — SIGNIFICANT CHANGE UP (ref 130–400)
POTASSIUM SERPL-MCNC: 3.5 MMOL/L — SIGNIFICANT CHANGE UP (ref 3.5–5)
POTASSIUM SERPL-SCNC: 3.5 MMOL/L — SIGNIFICANT CHANGE UP (ref 3.5–5)
PROCALCITONIN SERPL-MCNC: 0.07 NG/ML — SIGNIFICANT CHANGE UP (ref 0.02–0.1)
PROT SERPL-MCNC: 7 G/DL — SIGNIFICANT CHANGE UP (ref 6–8)
RBC # BLD: 4.14 M/UL — LOW (ref 4.2–5.4)
RBC # FLD: 14.6 % — HIGH (ref 11.5–14.5)
SODIUM SERPL-SCNC: 144 MMOL/L — SIGNIFICANT CHANGE UP (ref 135–146)
TRIGL SERPL-MCNC: 99 MG/DL — SIGNIFICANT CHANGE UP
TROPONIN T SERPL-MCNC: 0.01 NG/ML — SIGNIFICANT CHANGE UP
TSH SERPL-MCNC: 2.2 UIU/ML — SIGNIFICANT CHANGE UP (ref 0.27–4.2)
WBC # BLD: 13.69 K/UL — HIGH (ref 4.8–10.8)
WBC # FLD AUTO: 13.69 K/UL — HIGH (ref 4.8–10.8)

## 2022-03-28 PROCEDURE — 74018 RADEX ABDOMEN 1 VIEW: CPT | Mod: 26

## 2022-03-28 PROCEDURE — 95816 EEG AWAKE AND DROWSY: CPT | Mod: 26

## 2022-03-28 PROCEDURE — 99233 SBSQ HOSP IP/OBS HIGH 50: CPT

## 2022-03-28 PROCEDURE — 70551 MRI BRAIN STEM W/O DYE: CPT | Mod: 26

## 2022-03-28 PROCEDURE — 99222 1ST HOSP IP/OBS MODERATE 55: CPT

## 2022-03-28 RX ORDER — ASPIRIN/CALCIUM CARB/MAGNESIUM 324 MG
81 TABLET ORAL DAILY
Refills: 0 | Status: DISCONTINUED | OUTPATIENT
Start: 2022-03-28 | End: 2022-03-31

## 2022-03-28 RX ORDER — ATORVASTATIN CALCIUM 80 MG/1
40 TABLET, FILM COATED ORAL AT BEDTIME
Refills: 0 | Status: DISCONTINUED | OUTPATIENT
Start: 2022-03-28 | End: 2022-03-31

## 2022-03-28 RX ADMIN — PANTOPRAZOLE SODIUM 40 MILLIGRAM(S): 20 TABLET, DELAYED RELEASE ORAL at 08:35

## 2022-03-28 RX ADMIN — ENOXAPARIN SODIUM 40 MILLIGRAM(S): 100 INJECTION SUBCUTANEOUS at 22:58

## 2022-03-28 RX ADMIN — ATORVASTATIN CALCIUM 40 MILLIGRAM(S): 80 TABLET, FILM COATED ORAL at 21:47

## 2022-03-28 RX ADMIN — Medication 81 MILLIGRAM(S): at 13:43

## 2022-03-28 RX ADMIN — AMLODIPINE BESYLATE 5 MILLIGRAM(S): 2.5 TABLET ORAL at 06:22

## 2022-03-28 RX ADMIN — ANASTROZOLE 1 MILLIGRAM(S): 1 TABLET ORAL at 13:43

## 2022-03-28 NOTE — PHYSICAL THERAPY INITIAL EVALUATION ADULT - ADDITIONAL COMMENTS
daughter reports elevated toilet seat, EZ lift chair. Daughter reports pt has been performing basic home activities, toileting and ambulating in home independently

## 2022-03-28 NOTE — PROGRESS NOTE ADULT - SUBJECTIVE AND OBJECTIVE BOX
Patient is a 81y old Female who presents with a chief complaint of AMS. Primary diagnosis of Change in mental status. Today is hospital day 1d.     This morning patient was seen and examined at bedside, resting comfortably in bed.      No acute or major events overnight.      PAST MEDICAL & SURGICAL HISTORY  Breast CA  S/P surgery and on hormone therapy    HTN (hypertension)    H/O mastectomy, right    Status post knee replacement  B/L    SOCIAL HISTORY:  Social History:  Denies, smoking, drinking, drugs (27 Mar 2022 18:03)    ALLERGIES:  No Known Allergies    MEDICATIONS:  STANDING MEDICATIONS  amLODIPine   Tablet 5 milliGRAM(s) Oral daily  anastrozole 1 milliGRAM(s) Oral daily  aspirin  chewable 81 milliGRAM(s) Oral daily  enoxaparin Injectable 40 milliGRAM(s) SubCutaneous every 24 hours  pantoprazole    Tablet 40 milliGRAM(s) Oral before breakfast    PRN MEDICATIONS  melatonin 3 milliGRAM(s) Oral at bedtime PRN    VITALS:   T(F): 96.7  HR: 96  BP: 138/57  RR: 18  SpO2: 95%    PHYSICAL EXAM:  GENERAL: NAD, well-groomed, well-developed  HEAD:  Atraumatic, Normocephalic  EYES: EOMI  NECK: Supple  NERVOUS SYSTEM:  Alert & Oriented X2 (patient knows her name and place, does not know the year)  CHEST/LUNG: Clear to auscultation bilaterally; No rales, rhonchi, wheezing, or rubs  HEART: Regular rate and rhythm; No murmurs, rubs, or gallops  ABDOMEN: Soft, Nontender, Nondistended; Bowel sounds present  EXTREMITIES:  2+ Peripheral Pulses, No clubbing, cyanosis, or edema  LYMPH: No lymphadenopathy noted  SKIN: No rashes or lesions    LABS:                        12.6   13.69 )-----------( 205      ( 28 Mar 2022 04:30 )             39.0     -    144  |  106  |  16  ----------------------------<  113<H>  3.5   |  22  |  1.1    Ca    9.2      28 Mar 2022 04:30  Mg     2.0         TPro  7.0  /  Alb  4.1  /  TBili  1.0  /  DBili  x   /  AST  15  /  ALT  7   /  AlkPhos  115        Urinalysis Basic - ( 27 Mar 2022 10:11 )    Color: Light Yellow / Appearance: Clear / S.020 / pH: x  Gluc: x / Ketone: Negative  / Bili: Negative / Urobili: <2 mg/dL   Blood: x / Protein: 30 mg/dL / Nitrite: Negative   Leuk Esterase: Negative / RBC: 2 /HPF / WBC 2 /HPF   Sq Epi: x / Non Sq Epi: 1 /HPF / Bacteria: Negative    Troponin T, Serum: 0.01 ng/mL (22 @ 04:30)  Troponin T, Serum: 0.02 ng/mL *H* (22 @ 11:51)      CARDIAC MARKERS ( 28 Mar 2022 04:30 )  x     / 0.01 ng/mL / x     / x     / x      CARDIAC MARKERS ( 27 Mar 2022 11:51 )  x     / 0.02 ng/mL / x     / x     / x          RADIOLOGY:    -XR CHEST PORTABLE - 2022  Impression:    No radiographic evidence of acute cardiopulmonary disease.    - CT ANGIO BRAIN IC & CT ANGIO NECK - 2022  IMPRESSION:  CTA neck: No stenosis. No dissection.    CTA brain: Mild stenosis involving the intracranial vertebral arteries bilaterally.    - CT BRAIN - 2022  FINDINGS:  There is no CT evidence of acute transcortical infarct. Age-related involutional changes and chronic microvascular ischemic changes. Chronic infarcts involving the superior right parietal lobe, left occipital lobe, and bilateral cerebellar hemispheres. Chronic lacunar infarcts involving the right corona radiata and bilateral thalami.    There is no hydrocephalus, mass effect, or acute intracranial hemorrhage. No extra-axial collection. Basal cisterns are patent.    Layering fluid within the right sphenoid sinus. Moderate mucosal thickening with partial opacification of the right maxillary sinus. Mild mucosal thickening of the ethmoid air cells. Mastoid air cells are clear.    IMPRESSION:  No evidence of acute transcortical infarct, acute intracranial hemorrhage, or mass effect.     Patient is a 81y old Female who presents with a chief complaint of AMS. Primary diagnosis of Change in mental status. Today is hospital day 1d.     This morning patient was seen and examined at bedside, resting comfortably in bed.      No acute or major events overnight.    Subjective complaints: patient denies any complaints, and is very confused why she is in the hospital.    PAST MEDICAL & SURGICAL HISTORY  Breast CA  S/P surgery and on hormone therapy    HTN (hypertension)    H/O mastectomy, right    Status post knee replacement  B/L    SOCIAL HISTORY:  Social History:  Denies, smoking, drinking, drugs (27 Mar 2022 18:03)    ALLERGIES:  No Known Allergies    MEDICATIONS:  STANDING MEDICATIONS  amLODIPine   Tablet 5 milliGRAM(s) Oral daily  anastrozole 1 milliGRAM(s) Oral daily  aspirin  chewable 81 milliGRAM(s) Oral daily  enoxaparin Injectable 40 milliGRAM(s) SubCutaneous every 24 hours  pantoprazole    Tablet 40 milliGRAM(s) Oral before breakfast    PRN MEDICATIONS  melatonin 3 milliGRAM(s) Oral at bedtime PRN    VITALS:   T(F): 96.7  HR: 96  BP: 138/57  RR: 18  SpO2: 95%    PHYSICAL EXAM:  GENERAL: NAD, well-groomed, well-developed  HEAD:  Atraumatic, Normocephalic  EYES: EOMI  NECK: Supple  NERVOUS SYSTEM:  Alert & Oriented X2 (patient knows her name and place, does not know the year)  CHEST/LUNG: Clear to auscultation bilaterally; No rales, rhonchi, wheezing, or rubs  HEART: Regular rate and rhythm; No murmurs, rubs, or gallops  ABDOMEN: Soft, Nontender, Nondistended; Bowel sounds present  EXTREMITIES:  2+ Peripheral Pulses, No clubbing, cyanosis, or edema  LYMPH: No lymphadenopathy noted  SKIN: No rashes or lesions    LABS:                        12.6   13.69 )-----------( 205      ( 28 Mar 2022 04:30 )             39.0     -    144  |  106  |  16  ----------------------------<  113<H>  3.5   |  22  |  1.1    Ca    9.2      28 Mar 2022 04:30  Mg     2.0     03    TPro  7.0  /  Alb  4.1  /  TBili  1.0  /  DBili  x   /  AST  15  /  ALT  7   /  AlkPhos  115        Urinalysis Basic - ( 27 Mar 2022 10:11 )    Color: Light Yellow / Appearance: Clear / S.020 / pH: x  Gluc: x / Ketone: Negative  / Bili: Negative / Urobili: <2 mg/dL   Blood: x / Protein: 30 mg/dL / Nitrite: Negative   Leuk Esterase: Negative / RBC: 2 /HPF / WBC 2 /HPF   Sq Epi: x / Non Sq Epi: 1 /HPF / Bacteria: Negative    Troponin T, Serum: 0.01 ng/mL (22 @ 04:30)  Troponin T, Serum: 0.02 ng/mL *H* (22 @ 11:51)      CARDIAC MARKERS ( 28 Mar 2022 04:30 )  x     / 0.01 ng/mL / x     / x     / x      CARDIAC MARKERS ( 27 Mar 2022 11:51 )  x     / 0.02 ng/mL / x     / x     / x          RADIOLOGY:    -XR CHEST PORTABLE - 2022  Impression:    No radiographic evidence of acute cardiopulmonary disease.    - CT ANGIO BRAIN IC & CT ANGIO NECK - 2022  IMPRESSION:  CTA neck: No stenosis. No dissection.    CTA brain: Mild stenosis involving the intracranial vertebral arteries bilaterally.    - CT BRAIN - 2022  FINDINGS:  There is no CT evidence of acute transcortical infarct. Age-related involutional changes and chronic microvascular ischemic changes. Chronic infarcts involving the superior right parietal lobe, left occipital lobe, and bilateral cerebellar hemispheres. Chronic lacunar infarcts involving the right corona radiata and bilateral thalami.    There is no hydrocephalus, mass effect, or acute intracranial hemorrhage. No extra-axial collection. Basal cisterns are patent.    Layering fluid within the right sphenoid sinus. Moderate mucosal thickening with partial opacification of the right maxillary sinus. Mild mucosal thickening of the ethmoid air cells. Mastoid air cells are clear.    IMPRESSION:  No evidence of acute transcortical infarct, acute intracranial hemorrhage, or mass effect.     Patient is a 81y old Female who presents with a chief complaint of AMS. Primary diagnosis of Change in mental status. Today is hospital day 1d.     INTERVAL HPI AND OVERNIGHT EVENTS:  This morning patient was seen and examined at bedside, resting comfortably in bed.    No acute or major events overnight.    Subjective complaints: patient denies any complaints, and is very confused why she is in the hospital.    PAST MEDICAL & SURGICAL HISTORY  Breast CA  S/P surgery and on hormone therapy    HTN (hypertension)    H/O mastectomy, right    Status post knee replacement  B/L    SOCIAL HISTORY:  Social History:  Denies, smoking, drinking, drugs (27 Mar 2022 18:03)    ALLERGIES:  No Known Allergies    MEDICATIONS:  STANDING MEDICATIONS  amLODIPine   Tablet 5 milliGRAM(s) Oral daily  anastrozole 1 milliGRAM(s) Oral daily  aspirin  chewable 81 milliGRAM(s) Oral daily  enoxaparin Injectable 40 milliGRAM(s) SubCutaneous every 24 hours  pantoprazole    Tablet 40 milliGRAM(s) Oral before breakfast    PRN MEDICATIONS  melatonin 3 milliGRAM(s) Oral at bedtime PRN    VITALS:   T(F): 96.7  HR: 96  BP: 138/57  RR: 18  SpO2: 95%    PHYSICAL EXAM:  GENERAL: NAD, well-groomed, well-developed  HEAD:  Atraumatic, Normocephalic  EYES: EOMI  NECK: Supple  NERVOUS SYSTEM:  Alert & Oriented X2 (patient knows her name and place, does not know the year)  CHEST/LUNG: Clear to auscultation bilaterally; No rales, rhonchi, wheezing, or rubs  HEART: Regular rate and rhythm; No murmurs, rubs, or gallops  ABDOMEN: Soft, Nontender, Nondistended; Bowel sounds present  EXTREMITIES:  2+ Peripheral Pulses, No clubbing, cyanosis, or edema  LYMPH: No lymphadenopathy noted  SKIN: No rashes or lesions    LABS:                        12.6   13.69 )-----------( 205      ( 28 Mar 2022 04:30 )             39.0         144  |  106  |  16  ----------------------------<  113<H>  3.5   |  22  |  1.1    Ca    9.2      28 Mar 2022 04:30  Mg     2.0         TPro  7.0  /  Alb  4.1  /  TBili  1.0  /  DBili  x   /  AST  15  /  ALT  7   /  AlkPhos  115        Urinalysis Basic - ( 27 Mar 2022 10:11 )    Color: Light Yellow / Appearance: Clear / S.020 / pH: x  Gluc: x / Ketone: Negative  / Bili: Negative / Urobili: <2 mg/dL   Blood: x / Protein: 30 mg/dL / Nitrite: Negative   Leuk Esterase: Negative / RBC: 2 /HPF / WBC 2 /HPF   Sq Epi: x / Non Sq Epi: 1 /HPF / Bacteria: Negative    Troponin T, Serum: 0.01 ng/mL (22 @ 04:30)  Troponin T, Serum: 0.02 ng/mL *H* (22 @ 11:51)      CARDIAC MARKERS ( 28 Mar 2022 04:30 )  x     / 0.01 ng/mL / x     / x     / x      CARDIAC MARKERS ( 27 Mar 2022 11:51 )  x     / 0.02 ng/mL / x     / x     / x          RADIOLOGY:    -XR CHEST PORTABLE - 2022  Impression:    No radiographic evidence of acute cardiopulmonary disease.    - CT ANGIO BRAIN IC & CT ANGIO NECK - 2022  IMPRESSION:  CTA neck: No stenosis. No dissection.    CTA brain: Mild stenosis involving the intracranial vertebral arteries bilaterally.    - CT BRAIN - 2022  FINDINGS:  There is no CT evidence of acute transcortical infarct. Age-related involutional changes and chronic microvascular ischemic changes. Chronic infarcts involving the superior right parietal lobe, left occipital lobe, and bilateral cerebellar hemispheres. Chronic lacunar infarcts involving the right corona radiata and bilateral thalami.    There is no hydrocephalus, mass effect, or acute intracranial hemorrhage. No extra-axial collection. Basal cisterns are patent.    Layering fluid within the right sphenoid sinus. Moderate mucosal thickening with partial opacification of the right maxillary sinus. Mild mucosal thickening of the ethmoid air cells. Mastoid air cells are clear.    IMPRESSION:  No evidence of acute transcortical infarct, acute intracranial hemorrhage, or mass effect.     Patient is a 81y old Female who presents with a chief complaint of AMS. Primary diagnosis of Change in mental status. Today is hospital day 1d.     INTERVAL HPI AND OVERNIGHT EVENTS:  This morning patient was seen and examined at bedside, resting comfortably in bed.    No acute or major events overnight.    Subjective complaints: patient denies any complaints, and is very confused why she is in the hospital.    Attending note: Pt    PAST MEDICAL & SURGICAL HISTORY  Breast CA  S/P surgery and on hormone therapy    HTN (hypertension)    H/O mastectomy, right    Status post knee replacement  B/L    SOCIAL HISTORY:  Social History:  Denies, smoking, drinking, drugs (27 Mar 2022 18:03)    ALLERGIES:  No Known Allergies    MEDICATIONS:  STANDING MEDICATIONS  amLODIPine   Tablet 5 milliGRAM(s) Oral daily  anastrozole 1 milliGRAM(s) Oral daily  aspirin  chewable 81 milliGRAM(s) Oral daily  enoxaparin Injectable 40 milliGRAM(s) SubCutaneous every 24 hours  pantoprazole    Tablet 40 milliGRAM(s) Oral before breakfast    PRN MEDICATIONS  melatonin 3 milliGRAM(s) Oral at bedtime PRN    VITALS:   T(F): 96.7  HR: 96  BP: 138/57  RR: 18  SpO2: 95%    PHYSICAL EXAM:  GENERAL: NAD, well-groomed, well-developed  HEAD:  Atraumatic, Normocephalic  EYES: EOMI  NECK: Supple  NERVOUS SYSTEM:  Alert & Oriented X2 (patient knows her name and place, does not know the year)  CHEST/LUNG: Clear to auscultation bilaterally; No rales, rhonchi, wheezing, or rubs  HEART: Regular rate and rhythm; No murmurs, rubs, or gallops  ABDOMEN: Soft, Nontender, Nondistended; Bowel sounds present  EXTREMITIES:  2+ Peripheral Pulses, No clubbing, cyanosis, or edema  LYMPH: No lymphadenopathy noted  SKIN: No rashes or lesions    LABS:                        12.6   13.69 )-----------( 205      ( 28 Mar 2022 04:30 )             39.0         144  |  106  |  16  ----------------------------<  113<H>  3.5   |  22  |  1.1    Ca    9.2      28 Mar 2022 04:30  Mg     2.0         TPro  7.0  /  Alb  4.1  /  TBili  1.0  /  DBili  x   /  AST  15  /  ALT  7   /  AlkPhos  115        Urinalysis Basic - ( 27 Mar 2022 10:11 )    Color: Light Yellow / Appearance: Clear / S.020 / pH: x  Gluc: x / Ketone: Negative  / Bili: Negative / Urobili: <2 mg/dL   Blood: x / Protein: 30 mg/dL / Nitrite: Negative   Leuk Esterase: Negative / RBC: 2 /HPF / WBC 2 /HPF   Sq Epi: x / Non Sq Epi: 1 /HPF / Bacteria: Negative    Troponin T, Serum: 0.01 ng/mL (22 @ 04:30)  Troponin T, Serum: 0.02 ng/mL *H* (22 @ 11:51)      CARDIAC MARKERS ( 28 Mar 2022 04:30 )  x     / 0.01 ng/mL / x     / x     / x      CARDIAC MARKERS ( 27 Mar 2022 11:51 )  x     / 0.02 ng/mL / x     / x     / x          RADIOLOGY:    -XR CHEST PORTABLE - 2022  Impression:    No radiographic evidence of acute cardiopulmonary disease.    - CT ANGIO BRAIN IC & CT ANGIO NECK - 2022  IMPRESSION:  CTA neck: No stenosis. No dissection.    CTA brain: Mild stenosis involving the intracranial vertebral arteries bilaterally.    - CT BRAIN - 2022  FINDINGS:  There is no CT evidence of acute transcortical infarct. Age-related involutional changes and chronic microvascular ischemic changes. Chronic infarcts involving the superior right parietal lobe, left occipital lobe, and bilateral cerebellar hemispheres. Chronic lacunar infarcts involving the right corona radiata and bilateral thalami.    There is no hydrocephalus, mass effect, or acute intracranial hemorrhage. No extra-axial collection. Basal cisterns are patent.    Layering fluid within the right sphenoid sinus. Moderate mucosal thickening with partial opacification of the right maxillary sinus. Mild mucosal thickening of the ethmoid air cells. Mastoid air cells are clear.    IMPRESSION:  No evidence of acute transcortical infarct, acute intracranial hemorrhage, or mass effect.     Patient is a 81y old Female who presents with a chief complaint of AMS. Primary diagnosis of Change in mental status. Today is hospital day 1d.     INTERVAL HPI AND OVERNIGHT EVENTS:  This morning patient was seen and examined at bedside, resting comfortably in bed.    No acute or major events overnight.    Subjective complaints: patient denies any complaints, and is very confused why she is in the hospital.    Attending note: Pt seen and examined at bedside. No cp or sob. Daughter at bedside and updated me on home situation. Pt mental status has been worsening     PAST MEDICAL & SURGICAL HISTORY  Breast CA  S/P surgery and on hormone therapy    HTN (hypertension)    H/O mastectomy, right    Status post knee replacement  B/L    SOCIAL HISTORY:  Social History:  Denies, smoking, drinking, drugs (27 Mar 2022 18:03)    ALLERGIES:  No Known Allergies    MEDICATIONS:  STANDING MEDICATIONS  amLODIPine   Tablet 5 milliGRAM(s) Oral daily  anastrozole 1 milliGRAM(s) Oral daily  aspirin  chewable 81 milliGRAM(s) Oral daily  enoxaparin Injectable 40 milliGRAM(s) SubCutaneous every 24 hours  pantoprazole    Tablet 40 milliGRAM(s) Oral before breakfast    PRN MEDICATIONS  melatonin 3 milliGRAM(s) Oral at bedtime PRN    VITALS:   T(F): 96.7  HR: 96  BP: 138/57  RR: 18  SpO2: 95%    PHYSICAL EXAM:  GENERAL: NAD, well-groomed, well-developed  HEAD:  Atraumatic, Normocephalic  EYES: EOMI  NECK: Supple  NERVOUS SYSTEM:  Alert & Oriented X2 (patient knows her name and place, does not know the year or president)  CHEST/LUNG: Clear to auscultation bilaterally; No rales, rhonchi, wheezing, or rubs  HEART: Regular rate and rhythm; No murmurs, rubs, or gallops  ABDOMEN: Soft, Nontender, Nondistended; Bowel sounds present  EXTREMITIES:  2+ Peripheral Pulses, No clubbing, cyanosis, or edema  LYMPH: No lymphadenopathy noted  SKIN: No rashes or lesions    LABS:                        12.6   13.69 )-----------( 205      ( 28 Mar 2022 04:30 )             39.0         144  |  106  |  16  ----------------------------<  113<H>  3.5   |  22  |  1.1    Ca    9.2      28 Mar 2022 04:30  Mg     2.0         TPro  7.0  /  Alb  4.1  /  TBili  1.0  /  DBili  x   /  AST  15  /  ALT  7   /  AlkPhos  115        Urinalysis Basic - ( 27 Mar 2022 10:11 )    Color: Light Yellow / Appearance: Clear / S.020 / pH: x  Gluc: x / Ketone: Negative  / Bili: Negative / Urobili: <2 mg/dL   Blood: x / Protein: 30 mg/dL / Nitrite: Negative   Leuk Esterase: Negative / RBC: 2 /HPF / WBC 2 /HPF   Sq Epi: x / Non Sq Epi: 1 /HPF / Bacteria: Negative    Troponin T, Serum: 0.01 ng/mL (22 @ 04:30)  Troponin T, Serum: 0.02 ng/mL *H* (22 @ 11:51)      CARDIAC MARKERS ( 28 Mar 2022 04:30 )  x     / 0.01 ng/mL / x     / x     / x      CARDIAC MARKERS ( 27 Mar 2022 11:51 )  x     / 0.02 ng/mL / x     / x     / x          RADIOLOGY:    -XR CHEST PORTABLE - 2022  Impression:    No radiographic evidence of acute cardiopulmonary disease.    - CT ANGIO BRAIN IC & CT ANGIO NECK - 2022  IMPRESSION:  CTA neck: No stenosis. No dissection.    CTA brain: Mild stenosis involving the intracranial vertebral arteries bilaterally.    - CT BRAIN - 2022  FINDINGS:  There is no CT evidence of acute transcortical infarct. Age-related involutional changes and chronic microvascular ischemic changes. Chronic infarcts involving the superior right parietal lobe, left occipital lobe, and bilateral cerebellar hemispheres. Chronic lacunar infarcts involving the right corona radiata and bilateral thalami.    There is no hydrocephalus, mass effect, or acute intracranial hemorrhage. No extra-axial collection. Basal cisterns are patent.    Layering fluid within the right sphenoid sinus. Moderate mucosal thickening with partial opacification of the right maxillary sinus. Mild mucosal thickening of the ethmoid air cells. Mastoid air cells are clear.    IMPRESSION:  No evidence of acute transcortical infarct, acute intracranial hemorrhage, or mass effect.     Patient is a 81y old Female who presents with a chief complaint of AMS. Primary diagnosis of Change in mental status. Today is hospital day 1d.     INTERVAL HPI AND OVERNIGHT EVENTS:  This morning patient was seen and examined at bedside, resting comfortably in bed.    No acute or major events overnight.    Subjective complaints: patient denies any complaints, and is very confused why she is in the hospital.    Attending note: Pt seen and examined at bedside. No cp or sob. Daughter at bedside and updated me on home situation. Pt mental status has been worsening     PAST MEDICAL & SURGICAL HISTORY  Breast CA  S/P surgery and on hormone therapy    HTN (hypertension)    H/O mastectomy, right    Status post knee replacement  B/L    SOCIAL HISTORY:  Social History:  Denies, smoking, drinking, drugs (27 Mar 2022 18:03)    ALLERGIES:  No Known Allergies    MEDICATIONS:  STANDING MEDICATIONS  amLODIPine   Tablet 5 milliGRAM(s) Oral daily  anastrozole 1 milliGRAM(s) Oral daily  aspirin  chewable 81 milliGRAM(s) Oral daily  enoxaparin Injectable 40 milliGRAM(s) SubCutaneous every 24 hours  pantoprazole    Tablet 40 milliGRAM(s) Oral before breakfast    PRN MEDICATIONS  melatonin 3 milliGRAM(s) Oral at bedtime PRN    VITALS:   T(F): 96.7  HR: 96  BP: 138/57  RR: 18  SpO2: 95%    PHYSICAL EXAM:  GENERAL: NAD, well-groomed, well-developed  HEAD:  Atraumatic, Normocephalic  EYES: EOMI  NECK: Supple  NERVOUS SYSTEM:  Alert & Oriented X2 (patient knows her name and place, does not know the year or president)  CHEST/LUNG: Clear to auscultation bilaterally; No rales, rhonchi, wheezing, or rubs  HEART: Regular rate and rhythm; No murmurs, rubs, or gallops  ABDOMEN: Soft, Nontender, Nondistended; Bowel sounds present, +hernia  EXTREMITIES:  2+ Peripheral Pulses, No clubbing, cyanosis, or edema  LYMPH: No lymphadenopathy noted  SKIN: No rashes or lesions    LABS:                        12.6   13.69 )-----------( 205      ( 28 Mar 2022 04:30 )             39.0     -    144  |  106  |  16  ----------------------------<  113<H>  3.5   |  22  |  1.1    Ca    9.2      28 Mar 2022 04:30  Mg     2.0         TPro  7.0  /  Alb  4.1  /  TBili  1.0  /  DBili  x   /  AST  15  /  ALT  7   /  AlkPhos  115        Urinalysis Basic - ( 27 Mar 2022 10:11 )    Color: Light Yellow / Appearance: Clear / S.020 / pH: x  Gluc: x / Ketone: Negative  / Bili: Negative / Urobili: <2 mg/dL   Blood: x / Protein: 30 mg/dL / Nitrite: Negative   Leuk Esterase: Negative / RBC: 2 /HPF / WBC 2 /HPF   Sq Epi: x / Non Sq Epi: 1 /HPF / Bacteria: Negative    Troponin T, Serum: 0.01 ng/mL (22 @ 04:30)  Troponin T, Serum: 0.02 ng/mL *H* (22 @ 11:51)      CARDIAC MARKERS ( 28 Mar 2022 04:30 )  x     / 0.01 ng/mL / x     / x     / x      CARDIAC MARKERS ( 27 Mar 2022 11:51 )  x     / 0.02 ng/mL / x     / x     / x          RADIOLOGY:    -XR CHEST PORTABLE - 2022  Impression:    No radiographic evidence of acute cardiopulmonary disease.    - CT ANGIO BRAIN IC & CT ANGIO NECK - 2022  IMPRESSION:  CTA neck: No stenosis. No dissection.    CTA brain: Mild stenosis involving the intracranial vertebral arteries bilaterally.    - CT BRAIN - 2022  FINDINGS:  There is no CT evidence of acute transcortical infarct. Age-related involutional changes and chronic microvascular ischemic changes. Chronic infarcts involving the superior right parietal lobe, left occipital lobe, and bilateral cerebellar hemispheres. Chronic lacunar infarcts involving the right corona radiata and bilateral thalami.    There is no hydrocephalus, mass effect, or acute intracranial hemorrhage. No extra-axial collection. Basal cisterns are patent.    Layering fluid within the right sphenoid sinus. Moderate mucosal thickening with partial opacification of the right maxillary sinus. Mild mucosal thickening of the ethmoid air cells. Mastoid air cells are clear.    IMPRESSION:  No evidence of acute transcortical infarct, acute intracranial hemorrhage, or mass effect.     Patient is a 81y old Female who presents with a chief complaint of AMS. Primary diagnosis of Change in mental status. Today is hospital day 1d.     INTERVAL HPI AND OVERNIGHT EVENTS:  This morning patient was seen and examined at bedside, resting comfortably in bed.    No acute or major events overnight.    Subjective complaints: patient denies any complaints, and is very confused why she is in the hospital.    Attending note: Pt seen and examined at bedside. No cp or sob. Daughter at bedside and updated me on home situation. Pt mental status has been worsening     PAST MEDICAL & SURGICAL HISTORY  Breast CA  S/P surgery and on hormone therapy    HTN (hypertension)    H/O mastectomy, right    Status post knee replacement  B/L    SOCIAL HISTORY:  Social History:  Denies, smoking, drinking, drugs (27 Mar 2022 18:03)    ALLERGIES:  No Known Allergies    MEDICATIONS:  STANDING MEDICATIONS  amLODIPine   Tablet 5 milliGRAM(s) Oral daily  anastrozole 1 milliGRAM(s) Oral daily  aspirin  chewable 81 milliGRAM(s) Oral daily  enoxaparin Injectable 40 milliGRAM(s) SubCutaneous every 24 hours  pantoprazole    Tablet 40 milliGRAM(s) Oral before breakfast    PRN MEDICATIONS  melatonin 3 milliGRAM(s) Oral at bedtime PRN    VITALS:   T(F): 96.7  HR: 96  BP: 138/57  RR: 18  SpO2: 95%    PHYSICAL EXAM:  GENERAL: NAD, well-groomed, well-developed  HEAD:  Atraumatic, Normocephalic  EYES: EOMI  NECK: Supple  NERVOUS SYSTEM:  Alert & Oriented X2 (patient knows her name and place, does not know the year or president)  CHEST/LUNG: Clear to auscultation bilaterally; No rales, rhonchi, wheezing, or rubs  HEART: Regular rate and rhythm; No murmurs, rubs, or gallops  ABDOMEN: Soft, Nondistended; Bowel sounds present, +umbilical hernia with mild tenderness to palpation over RUQ and RLQ   EXTREMITIES:  2+ Peripheral Pulses, No clubbing, cyanosis, or edema  LYMPH: No lymphadenopathy noted  SKIN: No rashes or lesions    LABS:                        12.6   13.69 )-----------( 205      ( 28 Mar 2022 04:30 )             39.0     03-28    144  |  106  |  16  ----------------------------<  113<H>  3.5   |  22  |  1.1    Ca    9.2      28 Mar 2022 04:30  Mg     2.0         TPro  7.0  /  Alb  4.1  /  TBili  1.0  /  DBili  x   /  AST  15  /  ALT  7   /  AlkPhos  115        Urinalysis Basic - ( 27 Mar 2022 10:11 )    Color: Light Yellow / Appearance: Clear / S.020 / pH: x  Gluc: x / Ketone: Negative  / Bili: Negative / Urobili: <2 mg/dL   Blood: x / Protein: 30 mg/dL / Nitrite: Negative   Leuk Esterase: Negative / RBC: 2 /HPF / WBC 2 /HPF   Sq Epi: x / Non Sq Epi: 1 /HPF / Bacteria: Negative    Troponin T, Serum: 0.01 ng/mL (22 @ 04:30)  Troponin T, Serum: 0.02 ng/mL *H* (22 @ 11:51)      CARDIAC MARKERS ( 28 Mar 2022 04:30 )  x     / 0.01 ng/mL / x     / x     / x      CARDIAC MARKERS ( 27 Mar 2022 11:51 )  x     / 0.02 ng/mL / x     / x     / x          RADIOLOGY:    -XR CHEST PORTABLE - 2022  Impression:    No radiographic evidence of acute cardiopulmonary disease.    - CT ANGIO BRAIN IC & CT ANGIO NECK - 2022  IMPRESSION:  CTA neck: No stenosis. No dissection.    CTA brain: Mild stenosis involving the intracranial vertebral arteries bilaterally.    - CT BRAIN - 2022  FINDINGS:  There is no CT evidence of acute transcortical infarct. Age-related involutional changes and chronic microvascular ischemic changes. Chronic infarcts involving the superior right parietal lobe, left occipital lobe, and bilateral cerebellar hemispheres. Chronic lacunar infarcts involving the right corona radiata and bilateral thalami.    There is no hydrocephalus, mass effect, or acute intracranial hemorrhage. No extra-axial collection. Basal cisterns are patent.    Layering fluid within the right sphenoid sinus. Moderate mucosal thickening with partial opacification of the right maxillary sinus. Mild mucosal thickening of the ethmoid air cells. Mastoid air cells are clear.    IMPRESSION:  No evidence of acute transcortical infarct, acute intracranial hemorrhage, or mass effect.

## 2022-03-28 NOTE — PROGRESS NOTE ADULT - ASSESSMENT
· Assessment	   80 yo woman with a PMHx of HTN, R breast CA s/p lumpectomy 6-7 yrs ago, admitted for confusion/AMS, She was sent in by her sons after being noted to have progressive confusion for the last 3 days. Other than her confusion and lack of focus/coherence no other symptoms were reported. She notes that she was confused in the preceding days though cannot attest to why. While she initially appears lucid further questions unmasks confusion, thinking she has been in the hospital for days, unable to recall some previous details.     #AMS   - r/o stroke, seizures vs worsening dementia vs metabolic causes (hypothyroid vs vitamin def vs syphilis)    - Possible underlying dementia (may have progressing vascular dementia given the chronic infarcts noted on CTH)  - CTH and CTA negative for acute pathologies    - UA negative for UTI  - PT/OT consult   - c/w ASA   - Lipid panel noted  - f/u MRI brain  - f/u TSH, RPR, vitamin B12 and Folate  - f/u routine EEG  - Obtain Neuro consult if MRI / EEG show any abnormality     #HTN   - c/w Amlodipine 5mg PO daily    #Breast Cancer in remission  - c/w Anastrazole 1mg PO daily    #Hernia s/p Hernia repair (2016).  - No abdominal distension  - Monitor BM   - No signs of obstruction    # GI PPx - Protonix  # DVT PPx -Lovenox 40 mg SubQ    # Activity -  Increase as Tolerated   # Dispo -   Patient to be discharged when medically optimized.  # Code Status - FULL         · Assessment	   80 yo woman with a PMHx of HTN, R breast CA s/p lumpectomy 6-7 yrs ago, admitted for confusion/AMS, She was sent in by her sons after being noted to have progressive confusion for the last 3 days. Other than her confusion and lack of focus/coherence no other symptoms were reported. She notes that she was confused in the preceding days though cannot attest to why. While she initially appears lucid further questions unmasks confusion, thinking she has been in the hospital for days, unable to recall some previous details.     #AMS   - r/o stroke, seizures vs worsening dementia vs metabolic causes (hypothyroid vs vitamin def vs syphilis)    - Possible underlying dementia (may have progressing vascular dementia given the chronic infarcts noted on CTH)  - CTH and CTA negative for acute pathologies    - UA negative for UTI  - PT/OT consult   - c/w ASA   - Lipid panel noted  - f/u MRI brain  - f/u TSH, RPR, vitamin B12 and Folate  - f/u routine EEG  - f/u Neuro consult   - f/u with blood cultures - WBC (13.69) 3/28/22    #HTN   - c/w Amlodipine 5mg PO daily    #Breast Cancer in remission  - c/w Anastrazole 1mg PO daily    #Hernia s/p Hernia repair (2016).  - No abdominal distension  - Monitor BM   - No signs of obstruction    # GI PPx - Protonix  # DVT PPx -Lovenox 40 mg SubQ    # Activity -  Increase as Tolerated   # Dispo -   Patient to be discharged when medically optimized.  # Code Status - FULL         	   82 yo woman with a PMHx of HTN, R breast CA s/p lumpectomy 6-7 yrs ago, admitted for confusion/AMS, She was sent in by her sons after being noted to have progressive confusion for the last 3 days. Other than her confusion and lack of focus/coherence no other symptoms were reported. She notes that she was confused in the preceding days though cannot attest to why. While she initially appears lucid further questions unmasks confusion, thinking she has been in the hospital for days, unable to recall some previous details.     #AMS   - r/o stroke, seizures vs worsening dementia vs metabolic causes (hypothyroid vs vitamin def vs syphilis)    - Possible underlying dementia (may have progressing vascular dementia given the chronic infarcts noted on CTH)  - CTH and CTA negative for acute pathologies    - UA negative for UTI  - PT/OT consult   - c/w ASA   - Lipid panel noted  - f/u MRI brain  - f/u TSH, RPR, vitamin B12 and Folate  - f/u routine EEG  - f/u Neuro consult   - f/u blood cultures - WBC (13.69) 3/28/22    #HTN   - c/w Amlodipine 5mg PO daily    #Breast Cancer in remission  - c/w Anastrazole 1mg PO daily    #Hernia s/p Hernia repair (2016).  - No abdominal distension  - Monitor BM   - No signs of obstruction    # GI PPx - Protonix  # DVT PPx -Lovenox 40 mg SubQ    # Activity -  Increase as Tolerated   # Dispo -   Patient to be discharged when medically optimized.  # Code Status - FULL         	   80 yo woman with a PMHx of HTN, R breast CA s/p lumpectomy 6-7 yrs ago, admitted for confusion/AMS, She was sent in by her sons after being noted to have progressive confusion for the last 3 days. Other than her confusion and lack of focus/coherence no other symptoms were reported. She notes that she was confused in the preceding days though cannot attest to why. While she initially appears lucid further questions unmasks confusion, thinking she has been in the hospital for days, unable to recall some previous details.     #Metabolic encephalopathy vs  advancing dementia   - r/o stroke, seizures vs worsening dementia vs metabolic causes (hypothyroid vs vitamin def vs syphilis)    - Possible underlying dementia (may have progressing vascular dementia given the chronic infarcts noted on CTH)  - CTH and CTA negative for acute pathologies    - UA negative for UTI  - PT/OT consult   - c/w ASA   - Lipid panel noted  - f/u MRI brain  - f/u TSH, RPR, vitamin B12 and Folate  - f/u routine EEG  - f/u Neuro consult   - f/u blood cultures - WBC (13.69) 3/28/22    #HTN   - c/w Amlodipine 5mg PO daily    #Breast Cancer in remission  - c/w Anastrazole 1mg PO daily    #Hernia s/p Hernia repair (2016).  - No abdominal distension  - Monitor BM   - No signs of obstruction    # GI PPx - Protonix  # DVT PPx -Lovenox 40 mg SubQ    # Activity -  Increase as Tolerated   # Dispo -   Patient to be discharged when medically optimized.  # Code Status - FULL         	   82 yo woman with a PMHx of HTN, R breast CA s/p lumpectomy 6-7 yrs ago, admitted for confusion/AMS, She was sent in by her sons after being noted to have progressive confusion for the last 3 days. Other than her confusion and lack of focus/coherence no other symptoms were reported. She notes that she was confused in the preceding days though cannot attest to why. While she initially appears lucid further questions unmasks confusion, thinking she has been in the hospital for days, unable to recall some previous details.     #Metabolic encephalopathy vs  advancing dementia   - r/o stroke, seizures vs worsening dementia vs metabolic causes (hypothyroid vs vitamin def vs syphilis)    - Possible underlying dementia (may have progressing vascular dementia given the chronic infarcts noted on CTH)  - CTH and CTA negative for acute pathologies    - UA negative for UTI  - PT/OT consult   - c/w ASA   - Lipid panel noted  - f/u MRI brain  - f/u TSH, RPR, vitamin B12 and Folate  - f/u routine EEG  - f/u Neuro consult   - f/u blood cultures - WBC (13.69) 3/28/22    #leukocytosis  8->13? reactive?  UA neg  BClx sent  pt afebrile  check KUB as pt abd mild distention but likley 2/2 henria    #HTN   - c/w Amlodipine 5mg PO daily    #Breast Cancer in remission  - c/w Anastrazole 1mg PO daily    #Hernia s/p Hernia repair (2016).  - No abdominal distension  - Monitor BM   - No signs of obstruction    # GI PPx - Protonix  # DVT PPx -Lovenox 40 mg SubQ    # Activity -  Increase as Tolerated   # Dispo -   Patient to be discharged when medically optimized.  # Code Status - FULL         	    Hospital Course   82 yo woman with a PMHx of HTN, R breast CA s/p lumpectomy 6-7 yrs ago, admitted for confusion/AMS, She was sent in by her sons after being noted to have progressive confusion for the last 3 days. Other than her confusion and lack of focus/coherence no other symptoms were reported. She notes that she was confused in the preceding days though cannot attest to why. While she initially appears lucid further questions unmasks confusion, thinking she has been in the hospital for days, unable to recall some previous details.     # Altered Mental Status secondary to Metabolic encephalopathy vs  advancing dementia vs. post ictal state   - CT H showed chronic microvascular changes/chronic infarcts; no acute pathology --> progressive vacular dementia would explain the gradual onset of symptoms described by family at bedside   - CTA H/N largely normal   - f/u MRI Head pending to definitively rule out stroke   - f/u EEG to r/o postictal state   - f/u RPR, Vit B12 and Folate   - PT/OT consult   - f/u neuro consult   - c/w ASA     # Leukocytosis - unknown origin   # Umbilical Hernia   - UA (-), urethritis/cystitis/pyelo ruled out   - f/u blood cultures - WBC (13.69) 3/28/22  - If patient is febrile or unstable --> consider CT AP given umbilical hernia with slight tenderness on exam   - f/u KUB 3/28     #HTN   - c/w Amlodipine 5mg PO daily    #Breast Cancer in remission  - c/w Anastrazole 1mg PO daily    #Hernia s/p Hernia repair (2016).  - No abdominal distension  - Monitor BM   - No signs of obstruction    # GI PPx - Protonix  # DVT PPx -Lovenox 40 mg SubQ    # Activity -  Increase as Tolerated   # Dispo -   Patient to be discharged when medically optimized.  # Code Status - FULL         	    Hospital Course   80 yo woman with a PMHx of HTN, R breast CA s/p lumpectomy 6-7 yrs ago, admitted for confusion/AMS, She was sent in by her sons after being noted to have progressive confusion for the last 3 days. Other than her confusion and lack of focus/coherence no other symptoms were reported. She notes that she was confused in the preceding days though cannot attest to why. While she initially appears lucid further questions unmasks confusion, thinking she has been in the hospital for days, unable to recall some previous details.     # Altered Mental Status secondary to Metabolic encephalopathy vs  advancing dementia vs. post ictal state   - CT H showed chronic microvascular changes/chronic infarcts; no acute pathology --> progressive vacular dementia would explain the gradual onset of symptoms described by family at bedside   - CTA H/N largely normal   - Neuro Consult Appreciated: IF MR Head NC shows new stroke --> EPS Consult for possible loop recorder   - f/u MRI Head NC pending to definitively rule out stroke   - f/u EEG to r/o postictal state ---> performed; f/u read   - f/u RPR, Vit B12 and Folate   - PT/OT consult   - c/w ASA   - started Atorvastatin 40mg qd per neuro     # Leukocytosis - unknown origin   # Umbilical Hernia   - UA (-), urethritis/cystitis/pyelo ruled out   - f/u blood cultures - WBC (13.69) 3/28/22  - If patient is febrile or unstable --> consider CT AP given umbilical hernia with slight tenderness on exam   - f/u KUB 3/28     #HTN   - c/w Amlodipine 5mg PO daily    #Breast Cancer in remission  - c/w Anastrazole 1mg PO daily    #Hernia s/p Hernia repair (2016).  - No abdominal distension  - Monitor BM   - No signs of obstruction    # GI PPx - Protonix  # DVT PPx -Lovenox 40 mg SubQ    # Activity -  Increase as Tolerated   # Dispo -   Patient to be discharged when medically optimized.  # Code Status - FULL

## 2022-03-28 NOTE — OCCUPATIONAL THERAPY INITIAL EVALUATION ADULT - LEVEL OF INDEPENDENCE: BED TO CHAIR, REHAB EVAL
OT/PT present, Pt sitting prematurely and without notice. Attemped to use Kendra steady, however pt too fearful to complete transfer at this time./unable to perform

## 2022-03-28 NOTE — PHYSICAL THERAPY INITIAL EVALUATION ADULT - ACTIVE RANGE OF MOTION EXAMINATION, REHAB EVAL
B hips 1/4 AROM, B knees WFL, B ankles to neutral/bilateral upper extremity Active ROM was WFL (within functional limits)

## 2022-03-28 NOTE — PHYSICAL THERAPY INITIAL EVALUATION ADULT - LEVEL OF INDEPENDENCE: BED TO CHAIR, REHAB EVAL
able to use Kendra Stedy but pt with moderate R sided lean, and became very fearful when moved away from bed/unable to perform

## 2022-03-28 NOTE — OCCUPATIONAL THERAPY INITIAL EVALUATION ADULT - LONG TERM MEMORY, REHAB EVAL
Pt called daughter at bedside by wrong name, pt asking to see her  who she believed to be [place] however per daughter he has been  fo years./impaired

## 2022-03-28 NOTE — OCCUPATIONAL THERAPY INITIAL EVALUATION ADULT - PERTINENT HX OF CURRENT PROBLEM, REHAB EVAL
Pt is a right hand dominant female admitted 3/27 when family noted increased confusion and a decline in ADL performance, difficulty finding words and repeating self.  CTH, CTA,.CXR alll negative for acute pathology. Pt currently being worked up for cva vs seizure, vs metabolic etiology

## 2022-03-28 NOTE — PHYSICAL THERAPY INITIAL EVALUATION ADULT - GAIT DISTANCE, PT EVAL
pt stood 5 seconds x 4 attempts, unable to achieve hip extension, kept sitting down without warning. Pt stood with Kendra Gillian mod a x 2 but again sat down suddenly. Unsafe to attempt ambulation at this time

## 2022-03-28 NOTE — PHYSICAL THERAPY INITIAL EVALUATION ADULT - PERTINENT HX OF CURRENT PROBLEM, REHAB EVAL
Pt is an 80 yo R handed  with pmhx of HTN, R breast CA s/p lumpectomy 6-7 yrs ago, brought in to the ED for AMS for 2 days .

## 2022-03-28 NOTE — OCCUPATIONAL THERAPY INITIAL EVALUATION ADULT - BALANCE TRAINING, PT EVAL
Pt will increase sitting/standing balance by 1/3 grade to increase safety and decrease risk to fall by discharge

## 2022-03-28 NOTE — OCCUPATIONAL THERAPY INITIAL EVALUATION ADULT - IMPAIRED TRANSFERS: SIT/STAND, REHAB EVAL
significant lean to R/impaired balance/decreased flexibility/impaired motor control/impaired postural control/decreased strength

## 2022-03-28 NOTE — OCCUPATIONAL THERAPY INITIAL EVALUATION ADULT - VISUAL ASSESSMENT: TRACKING
Pt unable to follow command to maintain focus on target for full assessment. Pt able to make contact with therapists in different locations around her bedside

## 2022-03-28 NOTE — OCCUPATIONAL THERAPY INITIAL EVALUATION ADULT - TRANSFER TRAINING, PT EVAL
Pt will increase sit to stand to MOD X 1 with RW to increase independence with functional transfers by discharge

## 2022-03-28 NOTE — OCCUPATIONAL THERAPY INITIAL EVALUATION ADULT - ADDITIONAL COMMENTS
Per pt's daughter, pt was able to care for herself without issue as she was left alone 11-12 hours per day. Her cognitive and physical performance is a significant decline from baseline as per daughter at bedside

## 2022-03-28 NOTE — PROGRESS NOTE ADULT - TIME BILLING
#Progress Note Handoff  Pending (specify):  follow up neuro, mri and eeg, TSH ,rpr and b12  Family discussion: house staff updated pt family  Disposition: 4B, anticpate 48-72 hrs #Progress Note Handoff  Pending (specify):  follow up neuro, mri and eeg, TSH ,rpr and b12, KUB  Family discussion: house staff updated pt family  Disposition: 4B, anticpate 48-72 hrs

## 2022-03-28 NOTE — PHYSICAL THERAPY INITIAL EVALUATION ADULT - GENERAL OBSERVATIONS, REHAB EVAL
8:39-9:25 Pt encountered semifowler in bed in NAD. Daughter (Kira ) present. /90 HR 94 bpm, SPO2 on room air 96%. Pt denies any c/o of pain / numbness / weakness.

## 2022-03-28 NOTE — OCCUPATIONAL THERAPY INITIAL EVALUATION ADULT - GENERAL OBSERVATIONS, REHAB EVAL
Pt encountered semi folwer in bed. PT Kira and daughter present at bedisde. Pt is confused, anxious iwth standing. Mildly irritable at times. Attemoted sit to stand with RW x 2 and Kendra steady, both unsuccuessful at this time 2* pt axious, sitting prematurly. Unsafe to atempt transfer to chair at this time. Pt left semi denny in bed with R side supported by pillow 2* significant postural lean. IV dislodged RN aware. Pt's daughter at bedside

## 2022-03-28 NOTE — OCCUPATIONAL THERAPY INITIAL EVALUATION ADULT - RANGE OF MOTION EXAMINATION, UPPER EXTREMITY
grossly WFL, L shoulder ~90*, +gross grasp however significant arthiric changes to fingers, R elbow deformity from past fx

## 2022-03-28 NOTE — OCCUPATIONAL THERAPY INITIAL EVALUATION ADULT - IMPAIRED TRANSFERS: BED/CHAIR, REHAB EVAL
significant R postural lean/impaired balance/decreased flexibility/impaired motor control/impaired postural control

## 2022-03-28 NOTE — CONSULT NOTE ADULT - SUBJECTIVE AND OBJECTIVE BOX
BHAVIN CHATMAN     81y     Female    MRN-[id number]                                                           CC:Patient is a 81y old  Female who presents with a chief complaint of Altered Mental Status (28 Mar 2022 09:55)      HPI:  80 yo F with pmhx of HTN, R breast CA s/p lumpectomy 6-7 yrs ago, brought in to the ED for AMS for 2 days . Patient resides with daughter Brynn, and had son recently come visit 3d ago from out of town. Most of the history was obtained from the Son, Dillon who is at bedside. He states that his mother has shown signs of confusion 3 days ago. He noticed that his mother is increasingly fidgety, loses focus when performing her ADL, had trouble forming sentences, and repeating herself. She particularly kept repeating the last four digits of her SSN without any context.    She typically ambulates with a walker, and takes care of her own hygiene. However it is unknown how long this has been going on as per the son as his sister is the one that lives with her.     Son denies any symptoms. No fever/chills, nasal discharge/sore throat, CP, palpitations, SOB, cough, abd pain, NVD, dysuria, hematuria, new joint pain, FND, rashes, trauma.    In the ED vitals wnls. Labs significant for hypokalemia. Imaging including CTH, CTA and CXR show no acute pathology.  (27 Mar 2022 18:03)      ROS:  Constitutional, Neurological, Psychiatric, Eyes, ENT, Cardiovascular, Respiratory, Gastrointestinal, Genitourinary, Musculoskeletal, Integumentary, Endocrine and Heme/Lymph are otherwise negative. Except for    Social History: NoO smoking, No drinking, No drug use    FAMILY HISTORY: non-contributrory      HEALTH ISSUES - PROBLEM Dx:          Vital Signs Last 24 Hrs  T(C): 35.9 (28 Mar 2022 07:30), Max: 37.1 (28 Mar 2022 00:15)  T(F): 96.7 (28 Mar 2022 07:30), Max: 98.8 (28 Mar 2022 00:15)  HR: 96 (28 Mar 2022 07:30) (64 - 98)  BP: 138/57 (28 Mar 2022 07:30) (134/80 - 139/89)  BP(mean): --  RR: 18 (28 Mar 2022 07:30) (18 - 18)  SpO2: 95% (28 Mar 2022 00:15) (95% - 95%)    Physical Exam:  Constitutional: alert and in no acute distress.  Eyes: the sclera and conjunctiva were normal, pupils were equal in size, round, reactive to light, with normal accommodation and extraocular movements were intact.   Back: no costovertebral angle tenderness and no spinal tenderness.      Neuro Exam:  Orientation: oriented to person, says she is at home, does says March and when given 20 does says 22 for the year, able ot name and repeat, some difficulty with simple math  Attention: normal concentrating ability and visual attention was not decreased.   Language: no difficulty naming common objects, no difficulty repeating a phrase, no difficulty writing a sentence, fluency intact, comprehension intact and reading intact.   Fund of knowledge: displays adequate knowledge of personal past history.   Cranial Nerves: visual fields full to confrontation, pupils equal round and reactive to light, extraocular motion intact, facial sensation intact symmetrically, face symmetrical, hearing was intact bilaterally, tongue and palate midline, head turning and shoulder shrug symmetric and there was no tongue deviation with protrusion.   Motor: muscle tone was normal in all four extremities, muscle strength was normal in UE and proximal LE power 4+/5 on Right and 4/5 on left  Sensory exam: poorly reliable  Coordination:. gait not assessed. no tremor present.   Deep tendon reflexes:   absent  Plantar responses are equivocal b/l    NIHSS: 2    Allergies    No Known Allergies    Intolerances       Home Medications:  AMLODIPINE BESYLATE 5 MG TABS:  (27 Mar 2022 19:01)  anastrozole 1 mg oral tablet: 1 tab(s) orally once a day (27 Mar 2022 19:01)      MEDICATIONS  (STANDING):  amLODIPine   Tablet 5 milliGRAM(s) Oral daily  anastrozole 1 milliGRAM(s) Oral daily  aspirin  chewable 81 milliGRAM(s) Oral daily  enoxaparin Injectable 40 milliGRAM(s) SubCutaneous every 24 hours  pantoprazole    Tablet 40 milliGRAM(s) Oral before breakfast    MEDICATIONS  (PRN):  melatonin 3 milliGRAM(s) Oral at bedtime PRN Insomnia      LABS:                        12.6   13.69 )-----------( 205      ( 28 Mar 2022 04:30 )             39.0     03-28    144  |  106  |  16  ----------------------------<  113<H>  3.5   |  22  |  1.1    Ca    9.2      28 Mar 2022 04:30  Mg     2.0     03-27    TPro  7.0  /  Alb  4.1  /  TBili  1.0  /  DBili  x   /  AST  15  /  ALT  7   /  AlkPhos  115  03-28      Folate, Serum: >20.0 ng/mL (03.18.19 @ 17:00)    Thyroid Stimulating Hormone, Serum: 1.90 uIU/mL (03.18.19 @ 17:00)    Vitamin B12, Serum: 325 pg/mL (03.18.19 @ 17:00)            Neuro Imaging:  [facility]:   < from: CT Head No Cont (03.27.22 @ 12:29) >  PROCEDURE DATE:  03/27/2022          INTERPRETATION:  CLINICAL INDICATION: Altered mental status    TECHNIQUE: Axial CT scanning of the brain was obtained from the skull   base to the vertex without the administration of intravenous contrast.   Reformatted coronal and sagittal images were subsequently obtained and   reviewed.    COMPARISON: None    FINDINGS:  There is no CT evidence of acute transcortical infarct. Age-related   involutionalchanges and chronic microvascular ischemic changes. Chronic   infarcts involving the superior right parietal lobe, left occipital lobe,   and bilateral cerebellar hemispheres. Chronic lacunar infarcts involving   the right corona radiata and bilateral thalami.    There is no hydrocephalus, mass effect, or acute intracranial hemorrhage.   No extra-axial collection. Basal cisterns are patent.    Layering fluid within the right sphenoid sinus. Moderate mucosal   thickening with partial opacificationof the right maxillary sinus. Mild   mucosal thickening of the ethmoid air cells. Mastoid air cells are clear.    The calvarium is intact.    IMPRESSION:  No evidence of acute transcortical infarct, acute intracranial   hemorrhage, or mass effect.    --- End of Report ---    < end of copied text >      < from: CT Angio Head w/ IV Cont (03.27.22 @ 12:32) >    IMPRESSION:  CTA neck: No stenosis. No dissection.    CTA brain: Mild stenosis involving the intracranial vertebral arteries   bilaterally.    COMMENT:  Reference per NASCET criteria for degree of stenosis: Mild: less than 50%   stenosis. Moderate: 50-69% stenosis. Severe: 70-94%stenosis. Near   occlusion: 95-99% stenosis.    --- End of Report ---    < end of copied text >        EEG:    Echo:  Carotid Doppler:  Telemetry:    Assessment / Plan: This is a 81y year old Female presenting with   1.                  BHAVIN CHATMAN     81y     Female    MRN-[id number]                                                           CC:Patient is a 81y old  Female who presents with a chief complaint of Altered Mental Status (28 Mar 2022 09:55)      HPI:  82 yo F with pmhx of HTN, R breast CA s/p lumpectomy 6-7 yrs ago, brought in to the ED for AMS for 2 days . Patient resides with daughter Brynn, and had son recently come visit 3d ago from out of town. Most of the history was obtained from the Son, Dillon who is at bedside. He states that his mother has shown signs of confusion 3 days ago. He noticed that his mother is increasingly fidgety, loses focus when performing her ADL, had trouble forming sentences, and repeating herself. She particularly kept repeating the last four digits of her SSN without any context.    She typically ambulates with a walker, and takes care of her own hygiene. However it is unknown how long this has been going on as per the son as his sister is the one that lives with her.     Son denies any symptoms. No fever/chills, nasal discharge/sore throat, CP, palpitations, SOB, cough, abd pain, NVD, dysuria, hematuria, new joint pain, FND, rashes, trauma.    In the ED vitals wnls. Labs significant for hypokalemia. Imaging including CTH, CTA and CXR show no acute pathology.  (27 Mar 2022 18:03)    Patient seen and examined and history mostly from Daughter at bedside and other Daughter Brynn [phone] over the phone.  This weekend she complained of having difficulty getting up from the toilet which is not typical for her.  She then also began answering and speaking and not making sense.  She would given numbers when she was speaking and appeared to be repeating the last 4 of her social securtiy number.  Her baseline is sometimes forgetful but able to manage all of her ADLs except that her daughter manages her bills.  She lives with her daughter Brynn is is independent most of the day while her daughter is at work.  Not suspicion from other family members of any major issues other then some forgetfulness until this weekend.    ROS:  Constitutional, Neurological, Psychiatric, Eyes, ENT, Cardiovascular, Respiratory, Gastrointestinal, Genitourinary, Musculoskeletal, Integumentary, Endocrine and Heme/Lymph are otherwise negative. Except for noted above    Social History: NoO smoking, No drinking, No drug use    FAMILY HISTORY: non-contributrory      HEALTH ISSUES - PROBLEM Dx:          Vital Signs Last 24 Hrs  T(C): 35.9 (28 Mar 2022 07:30), Max: 37.1 (28 Mar 2022 00:15)  T(F): 96.7 (28 Mar 2022 07:30), Max: 98.8 (28 Mar 2022 00:15)  HR: 96 (28 Mar 2022 07:30) (64 - 98)  BP: 138/57 (28 Mar 2022 07:30) (134/80 - 139/89)  BP(mean): --  RR: 18 (28 Mar 2022 07:30) (18 - 18)  SpO2: 95% (28 Mar 2022 00:15) (95% - 95%)    Physical Exam:  Constitutional: alert and in no acute distress.  Eyes: the sclera and conjunctiva were normal, pupils were equal in size, round, reactive to light, with normal accommodation and extraocular movements were intact.   Back: no costovertebral angle tenderness and no spinal tenderness.      Neuro Exam:  Orientation: oriented to person, says she is at home, does says March and when given 20 does says 22 for the year, able ot name and repeat, some difficulty with simple math  Attention: normal concentrating ability and visual attention was not decreased.   Language: no difficulty naming common objects, no difficulty repeating a phrase, no difficulty writing a sentence, fluency intact, comprehension intact and reading intact.   Fund of knowledge: displays adequate knowledge of personal past history.   Cranial Nerves: visual fields full to confrontation, pupils equal round and reactive to light, extraocular motion intact, facial sensation intact symmetrically, face symmetrical, hearing was intact bilaterally, tongue and palate midline, head turning and shoulder shrug symmetric and there was no tongue deviation with protrusion.   Motor: muscle tone was normal in all four extremities, muscle strength was normal in UE and proximal LE power 4+/5 on Right and 4/5 on left  Sensory exam: poorly reliable  Coordination:. gait not assessed. no tremor present.   Deep tendon reflexes:   absent  Plantar responses are equivocal b/l    NIHSS: 2    Allergies    No Known Allergies    Intolerances       Home Medications:  AMLODIPINE BESYLATE 5 MG TABS:  (27 Mar 2022 19:01)  anastrozole 1 mg oral tablet: 1 tab(s) orally once a day (27 Mar 2022 19:01)      MEDICATIONS  (STANDING):  amLODIPine   Tablet 5 milliGRAM(s) Oral daily  anastrozole 1 milliGRAM(s) Oral daily  aspirin  chewable 81 milliGRAM(s) Oral daily  enoxaparin Injectable 40 milliGRAM(s) SubCutaneous every 24 hours  pantoprazole    Tablet 40 milliGRAM(s) Oral before breakfast    MEDICATIONS  (PRN):  melatonin 3 milliGRAM(s) Oral at bedtime PRN Insomnia      LABS:                        12.6   13.69 )-----------( 205      ( 28 Mar 2022 04:30 )             39.0     03-28    144  |  106  |  16  ----------------------------<  113<H>  3.5   |  22  |  1.1    Ca    9.2      28 Mar 2022 04:30  Mg     2.0     03-27    TPro  7.0  /  Alb  4.1  /  TBili  1.0  /  DBili  x   /  AST  15  /  ALT  7   /  AlkPhos  115  03-28      Folate, Serum: >20.0 ng/mL (03.18.19 @ 17:00)    Thyroid Stimulating Hormone, Serum: 1.90 uIU/mL (03.18.19 @ 17:00)    Vitamin B12, Serum: 325 pg/mL (03.18.19 @ 17:00)            [facility]:  [facility]   < from: CT Head No Cont (03.27.22 @ 12:29) >  PROCEDURE DATE:  03/27/2022          INTERPRETATION:  CLINICAL INDICATION: Altered mental status    TECHNIQUE: Axial CT scanning of the brain was obtained from the skull   base to the vertex without the administration of intravenous contrast.   Reformatted coronal and sagittal images were subsequently obtained and   reviewed.    COMPARISON: None    FINDINGS:  There is no CT evidence of acute transcortical infarct. Age-related   involutionalchanges and chronic microvascular ischemic changes. Chronic   infarcts involving the superior right parietal lobe, left occipital lobe,   and bilateral cerebellar hemispheres. Chronic lacunar infarcts involving   the right corona radiata and bilateral thalami.    There is no hydrocephalus, mass effect, or acute intracranial hemorrhage.   No extra-axial collection. Basal cisterns are patent.    Layering fluid within the right sphenoid sinus. Moderate mucosal   thickening with partial opacificationof the right maxillary sinus. Mild   mucosal thickening of the ethmoid air cells. Mastoid air cells are clear.    The calvarium is intact.    IMPRESSION:  No evidence of acute transcortical infarct, acute intracranial   hemorrhage, or mass effect.    --- End of Report ---    < end of copied text >      < from: CT Angio Head w/ IV Cont (03.27.22 @ 12:32) >    IMPRESSION:  CTA neck: No stenosis. No dissection.    CTA brain: Mild stenosis involving the intracranial vertebral arteries   bilaterally.    COMMENT:  Reference per NASCET criteria for degree of stenosis: Mild: less than 50%   stenosis. Moderate: 50-69% stenosis. Severe: 70-94%stenosis. Near   occlusion: 95-99% stenosis.    --- End of Report ---    < end of copied text >        EEG: pending        Assessment / Plan: This is a 81y year old Female presenting with acute change in mental status with CTH imaging showing multiple old infarcts.  Possible vascular dementia however the acute change this weekend is more worriesome for a new stroke.  DDX also includes seizure or toxic/metabolic encephalopathy  1. MRI brain w/o MAGO (will like need some medication for sedation; family aware)  2. REEG (currently being done)  3. trend CBC, BMP daily  4. Continue aspirin 81mg QD and would add atorvastatin 40mg QD  5. ECHO as part of stroke workup   6. If new stroke seen would suggest EPS for ILR

## 2022-03-28 NOTE — OCCUPATIONAL THERAPY INITIAL EVALUATION ADULT - PLANNED THERAPY INTERVENTIONS, OT EVAL
ADL retraining/balance training/bed mobility training/cognitive, visual perceptual/fine motor coordination training/joint mobilization/motor coordination training/ROM/strengthening/stretching/transfer training

## 2022-03-29 LAB
ALBUMIN SERPL ELPH-MCNC: 3.4 G/DL — LOW (ref 3.5–5.2)
ALBUMIN SERPL ELPH-MCNC: 4 G/DL — SIGNIFICANT CHANGE UP (ref 3.5–5.2)
ALP SERPL-CCNC: 116 U/L — HIGH (ref 30–115)
ALP SERPL-CCNC: 90 U/L — SIGNIFICANT CHANGE UP (ref 30–115)
ALT FLD-CCNC: 6 U/L — SIGNIFICANT CHANGE UP (ref 0–41)
ALT FLD-CCNC: 7 U/L — SIGNIFICANT CHANGE UP (ref 0–41)
ANION GAP SERPL CALC-SCNC: 13 MMOL/L — SIGNIFICANT CHANGE UP (ref 7–14)
ANION GAP SERPL CALC-SCNC: 15 MMOL/L — HIGH (ref 7–14)
AST SERPL-CCNC: 13 U/L — SIGNIFICANT CHANGE UP (ref 0–41)
AST SERPL-CCNC: 15 U/L — SIGNIFICANT CHANGE UP (ref 0–41)
BASOPHILS # BLD AUTO: 0.04 K/UL — SIGNIFICANT CHANGE UP (ref 0–0.2)
BASOPHILS NFR BLD AUTO: 0.3 % — SIGNIFICANT CHANGE UP (ref 0–1)
BILIRUB SERPL-MCNC: 0.9 MG/DL — SIGNIFICANT CHANGE UP (ref 0.2–1.2)
BILIRUB SERPL-MCNC: 1.3 MG/DL — HIGH (ref 0.2–1.2)
BUN SERPL-MCNC: 18 MG/DL — SIGNIFICANT CHANGE UP (ref 10–20)
BUN SERPL-MCNC: 19 MG/DL — SIGNIFICANT CHANGE UP (ref 10–20)
CALCIUM SERPL-MCNC: 8.2 MG/DL — LOW (ref 8.5–10.1)
CALCIUM SERPL-MCNC: 9.1 MG/DL — SIGNIFICANT CHANGE UP (ref 8.5–10.1)
CHLORIDE SERPL-SCNC: 102 MMOL/L — SIGNIFICANT CHANGE UP (ref 98–110)
CHLORIDE SERPL-SCNC: 111 MMOL/L — HIGH (ref 98–110)
CO2 SERPL-SCNC: 20 MMOL/L — SIGNIFICANT CHANGE UP (ref 17–32)
CO2 SERPL-SCNC: 22 MMOL/L — SIGNIFICANT CHANGE UP (ref 17–32)
CREAT SERPL-MCNC: 1.2 MG/DL — SIGNIFICANT CHANGE UP (ref 0.7–1.5)
CREAT SERPL-MCNC: 1.3 MG/DL — SIGNIFICANT CHANGE UP (ref 0.7–1.5)
CULTURE RESULTS: SIGNIFICANT CHANGE UP
EGFR: 41 ML/MIN/1.73M2 — LOW
EGFR: 45 ML/MIN/1.73M2 — LOW
EOSINOPHIL # BLD AUTO: 0.07 K/UL — SIGNIFICANT CHANGE UP (ref 0–0.7)
EOSINOPHIL NFR BLD AUTO: 0.6 % — SIGNIFICANT CHANGE UP (ref 0–8)
GLUCOSE SERPL-MCNC: 100 MG/DL — HIGH (ref 70–99)
GLUCOSE SERPL-MCNC: 276 MG/DL — HIGH (ref 70–99)
HCT VFR BLD CALC: 33.7 % — LOW (ref 37–47)
HCT VFR BLD CALC: 35.3 % — LOW (ref 37–47)
HCT VFR BLD CALC: 39.4 % — SIGNIFICANT CHANGE UP (ref 37–47)
HGB BLD-MCNC: 10.9 G/DL — LOW (ref 12–16)
HGB BLD-MCNC: 11.3 G/DL — LOW (ref 12–16)
HGB BLD-MCNC: 12.6 G/DL — SIGNIFICANT CHANGE UP (ref 12–16)
IMM GRANULOCYTES NFR BLD AUTO: 0.3 % — SIGNIFICANT CHANGE UP (ref 0.1–0.3)
LYMPHOCYTES # BLD AUTO: 0.97 K/UL — LOW (ref 1.2–3.4)
LYMPHOCYTES # BLD AUTO: 8.2 % — LOW (ref 20.5–51.1)
MAGNESIUM SERPL-MCNC: 2 MG/DL — SIGNIFICANT CHANGE UP (ref 1.8–2.4)
MCHC RBC-ENTMCNC: 30 PG — SIGNIFICANT CHANGE UP (ref 27–31)
MCHC RBC-ENTMCNC: 30.4 PG — SIGNIFICANT CHANGE UP (ref 27–31)
MCHC RBC-ENTMCNC: 30.4 PG — SIGNIFICANT CHANGE UP (ref 27–31)
MCHC RBC-ENTMCNC: 32 G/DL — SIGNIFICANT CHANGE UP (ref 32–37)
MCHC RBC-ENTMCNC: 32 G/DL — SIGNIFICANT CHANGE UP (ref 32–37)
MCHC RBC-ENTMCNC: 32.3 G/DL — SIGNIFICANT CHANGE UP (ref 32–37)
MCV RBC AUTO: 93.6 FL — SIGNIFICANT CHANGE UP (ref 81–99)
MCV RBC AUTO: 94.1 FL — SIGNIFICANT CHANGE UP (ref 81–99)
MCV RBC AUTO: 94.9 FL — SIGNIFICANT CHANGE UP (ref 81–99)
MONOCYTES # BLD AUTO: 0.81 K/UL — HIGH (ref 0.1–0.6)
MONOCYTES NFR BLD AUTO: 6.9 % — SIGNIFICANT CHANGE UP (ref 1.7–9.3)
NEUTROPHILS # BLD AUTO: 9.84 K/UL — HIGH (ref 1.4–6.5)
NEUTROPHILS NFR BLD AUTO: 83.7 % — HIGH (ref 42.2–75.2)
NRBC # BLD: 0 /100 WBCS — SIGNIFICANT CHANGE UP (ref 0–0)
PLATELET # BLD AUTO: 177 K/UL — SIGNIFICANT CHANGE UP (ref 130–400)
PLATELET # BLD AUTO: 188 K/UL — SIGNIFICANT CHANGE UP (ref 130–400)
PLATELET # BLD AUTO: 202 K/UL — SIGNIFICANT CHANGE UP (ref 130–400)
POTASSIUM SERPL-MCNC: 3.4 MMOL/L — LOW (ref 3.5–5)
POTASSIUM SERPL-MCNC: 3.4 MMOL/L — LOW (ref 3.5–5)
POTASSIUM SERPL-SCNC: 3.4 MMOL/L — LOW (ref 3.5–5)
POTASSIUM SERPL-SCNC: 3.4 MMOL/L — LOW (ref 3.5–5)
PROT SERPL-MCNC: 5.9 G/DL — LOW (ref 6–8)
PROT SERPL-MCNC: 6.7 G/DL — SIGNIFICANT CHANGE UP (ref 6–8)
RBC # BLD: 3.58 M/UL — LOW (ref 4.2–5.4)
RBC # BLD: 3.77 M/UL — LOW (ref 4.2–5.4)
RBC # BLD: 4.15 M/UL — LOW (ref 4.2–5.4)
RBC # FLD: 14.4 % — SIGNIFICANT CHANGE UP (ref 11.5–14.5)
RBC # FLD: 14.6 % — HIGH (ref 11.5–14.5)
RBC # FLD: 14.6 % — HIGH (ref 11.5–14.5)
SODIUM SERPL-SCNC: 135 MMOL/L — SIGNIFICANT CHANGE UP (ref 135–146)
SODIUM SERPL-SCNC: 148 MMOL/L — HIGH (ref 135–146)
SPECIMEN SOURCE: SIGNIFICANT CHANGE UP
T PALLIDUM AB TITR SER: NEGATIVE — SIGNIFICANT CHANGE UP
VIT B12 SERPL-MCNC: 356 PG/ML — SIGNIFICANT CHANGE UP (ref 232–1245)
WBC # BLD: 11.77 K/UL — HIGH (ref 4.8–10.8)
WBC # BLD: 12.15 K/UL — HIGH (ref 4.8–10.8)
WBC # BLD: 13.47 K/UL — HIGH (ref 4.8–10.8)
WBC # FLD AUTO: 11.77 K/UL — HIGH (ref 4.8–10.8)
WBC # FLD AUTO: 12.15 K/UL — HIGH (ref 4.8–10.8)
WBC # FLD AUTO: 13.47 K/UL — HIGH (ref 4.8–10.8)

## 2022-03-29 PROCEDURE — 70450 CT HEAD/BRAIN W/O DYE: CPT | Mod: 26

## 2022-03-29 PROCEDURE — 99233 SBSQ HOSP IP/OBS HIGH 50: CPT

## 2022-03-29 RX ORDER — POTASSIUM CHLORIDE 20 MEQ
20 PACKET (EA) ORAL
Refills: 0 | Status: COMPLETED | OUTPATIENT
Start: 2022-03-29 | End: 2022-03-29

## 2022-03-29 RX ORDER — SODIUM CHLORIDE 9 MG/ML
1000 INJECTION, SOLUTION INTRAVENOUS
Refills: 0 | Status: DISCONTINUED | OUTPATIENT
Start: 2022-03-29 | End: 2022-03-31

## 2022-03-29 RX ADMIN — Medication 50 MILLIEQUIVALENT(S): at 14:25

## 2022-03-29 RX ADMIN — AMLODIPINE BESYLATE 5 MILLIGRAM(S): 2.5 TABLET ORAL at 05:37

## 2022-03-29 RX ADMIN — ANASTROZOLE 1 MILLIGRAM(S): 1 TABLET ORAL at 11:34

## 2022-03-29 RX ADMIN — ENOXAPARIN SODIUM 40 MILLIGRAM(S): 100 INJECTION SUBCUTANEOUS at 21:29

## 2022-03-29 RX ADMIN — SODIUM CHLORIDE 50 MILLILITER(S): 9 INJECTION, SOLUTION INTRAVENOUS at 11:32

## 2022-03-29 RX ADMIN — Medication 81 MILLIGRAM(S): at 11:35

## 2022-03-29 RX ADMIN — Medication 50 MILLIEQUIVALENT(S): at 11:35

## 2022-03-29 RX ADMIN — ATORVASTATIN CALCIUM 40 MILLIGRAM(S): 80 TABLET, FILM COATED ORAL at 21:29

## 2022-03-29 RX ADMIN — PANTOPRAZOLE SODIUM 40 MILLIGRAM(S): 20 TABLET, DELAYED RELEASE ORAL at 07:53

## 2022-03-29 NOTE — CONSULT NOTE ADULT - ASSESSMENT
IMPRESSION: Rehab of   ataxia, chronic lacunar CVA's, r/o acute CVA, LLE paresis    PRECAUTIONS: [ x ] Cardiac  [  ] Respiratory  [  ] Seizures [  ] Contact Isolation  [  ] Droplet Isolation  [  ] Other    Weight Bearing Status:     RECOMMENDATION:    Out of Bed to Chair     DVT/Decubiti Prophylaxis    REHAB PLAN:     [ x  ] Bedside P/T 3-5 times a week   [   ]   Bedside O/T  2-3 times a week             [   ] No Rehab Therapy Indicated                   [   ]  Speech Therapy   Conditioning/ROM                                    ADL  Bed Mobility                                               Conditioning/ROM  Transfers                                                     Bed Mobility  Sitting /Standing Balance                         Transfers                                        Gait Training                                               Sitting/Standing Balance  Stair Training [   ]Applicable                    Home equipment Eval                                                                        Splinting  [   ] Only      GOALS:   ADL   [ x  ]   Independent                    Transfers  [ x  ] Independent                          Ambulation  [ x  ] Independent     [ x   ] With device                            [ x  ]  CG                                                         [x   ]  CG                                                                  [ x  ] CG                            [    ] Min A                                                   [   ] Min A                                                              [   ] Min  A          DISCHARGE PLAN:   [   ]  Good candidate for Intensive Rehabilitation/Hospital based-4A [facility]                                             Will tolerate 3hrs Intensive Rehab Daily                                       [xx    ]  Short Term Rehab in Skilled Nursing Facility                                       [    ]  Home with Outpatient or  services                                         [    ]  Possible Candidate for Intensive Hospital based Rehab

## 2022-03-29 NOTE — PROGRESS NOTE ADULT - ASSESSMENT
Hospital Course   82 yo woman with a PMHx of HTN, R breast CA s/p lumpectomy 6-7 yrs ago, admitted for confusion/AMS, She was sent in by her sons after being noted to have progressive confusion for the last 3 days. Other than her confusion and lack of focus/coherence no other symptoms were reported. She notes that she was confused in the preceding days though cannot attest to why. While she initially appears lucid further questions unmasks confusion, thinking she has been in the hospital for days, unable to recall some previous details.     # Altered Mental Status secondary to Metabolic encephalopathy vs  advancing dementia vs. post ictal state   - CT H showed chronic microvascular changes/chronic infarcts; no acute pathology --> progressive vacular dementia would explain the gradual onset of symptoms described by family at bedside   - CTA H/N largely normal   - Neuro Consult Appreciated: IF MR Head NC shows new stroke --> EPS Consult for possible loop recorder   - f/u MRI Head NC pending to definitively rule out stroke--> completed  follow up read   - f/u EEG to r/o postictal state ---> performed; f/u read   - f/u RPR, Vit B12 and Folate   - PT/OT consult   - c/w ASA   - started Atorvastatin 40mg qd per neuro     # Leukocytosis - unknown origin   # Umbilical Hernia   - UA (-), urethritis/cystitis/pyelo ruled out   - f/u blood cultures - WBC (13.69) 3/28/22 remains 13 today   - If patient is febrile or unstable --> consider CT AP given umbilical hernia with slight tenderness on exam   - f/u KUB 3/28 --> non obstructive pattern     #HTN   - c/w Amlodipine 5mg PO daily    #Breast Cancer in remission  - c/w Anastrazole 1mg PO daily    #Hernia s/p Hernia repair (2016).  - No abdominal distension  - Monitor BM   - No signs of obstruction    #Progress Note Handoff  Pending (specify):  MRI and EEG read, follow up blood cultures  Family discussion: house staff updated pt family  Disposition: 4B, anticipated 24 hrs       	    Hospital Course   80 yo woman with a PMHx of HTN, R breast CA s/p lumpectomy 6-7 yrs ago, admitted for confusion/AMS, She was sent in by her sons after being noted to have progressive confusion for the last 3 days. Other than her confusion and lack of focus/coherence no other symptoms were reported. She notes that she was confused in the preceding days though cannot attest to why. While she initially appears lucid further questions unmasks confusion, thinking she has been in the hospital for days, unable to recall some previous details.     # Altered Mental Status secondary to Metabolic encephalopathy vs  advancing dementia vs. post ictal state   - CT H showed chronic microvascular changes/chronic infarcts; no acute pathology --> progressive vacular dementia would explain the gradual onset of symptoms described by family at bedside   - CTA H/N largely normal   - Neuro Consult Appreciated: IF MR Head NC shows new stroke --> EPS Consult for possible loop recorder   - f/u MRI Head NC pending to definitively rule out stroke--> completed  follow up read   - f/u EEG to r/o postictal state ---> performed; f/u read   - f/u RPR, Vit B12 and Folate   - PT/OT consult   - c/w ASA   - started Atorvastatin 40mg qd per neuro   - Echo    # Leukocytosis - unknown origin   # Umbilical Hernia   - UA (-), urethritis/cystitis/pyelo ruled out   - f/u blood cultures - WBC (13.69) 3/28/22 remains 13 today   - If patient is febrile or unstable --> consider CT AP given umbilical hernia with slight tenderness on exam   - f/u KUB 3/28 --> non obstructive pattern     #HTN   - c/w Amlodipine 5mg PO daily    #Breast Cancer in remission  - c/w Anastrazole 1mg PO daily    #Hernia s/p Hernia repair (2016).  - No abdominal distension  - Monitor BM   - No signs of obstruction    #Progress Note Handoff  Pending (specify):  MRI and EEG read, follow up blood cultures, echo  Family discussion: house staff updated pt family  Disposition: 4B, anticipated 24 hrs       	    Hospital Course   80 yo woman with a PMHx of HTN, R breast CA s/p lumpectomy 6-7 yrs ago, admitted for confusion/AMS, She was sent in by her sons after being noted to have progressive confusion for the last 3 days. Other than her confusion and lack of focus/coherence no other symptoms were reported. She notes that she was confused in the preceding days though cannot attest to why. While she initially appears lucid further questions unmasks confusion, thinking she has been in the hospital for days, unable to recall some previous details.     # Altered Mental Status secondary to Metabolic encephalopathy vs  advancing dementia vs. post ictal state   - CT H showed chronic microvascular changes/chronic infarcts; no acute pathology --> progressive vacular dementia would explain the gradual onset of symptoms described by family at bedside   - CTA H/N largely normal   - Neuro Consult Appreciated: IF MR Head NC shows new stroke --> EPS Consult for possible loop recorder   - f/u MRI Head NC pending to definitively rule out stroke--> completed  follow up read   - f/u EEG to r/o postictal state ---> performed; f/u read   - f/u RPR, Vit B12 and Folate   - PT/OT consult   - c/w ASA   - started Atorvastatin 40mg qd per neuro   - Echo    #hypernatremia- 148-1.3L--> d5w at 50 CC, repeat cmp at 1600    # Leukocytosis - unknown origin, possible hemoconcentrated  # Umbilical Hernia   - UA (-), urethritis/cystitis/pyelo ruled out   - f/u blood cultures - WBC (13.69) 3/28/22 remains 13 today   - If patient is febrile or unstable --> consider CT AP given umbilical hernia with slight tenderness on exam   - f/u KUB 3/28 --> non obstructive pattern     #HTN   - c/w Amlodipine 5mg PO daily    #Breast Cancer in remission  - c/w Anastrazole 1mg PO daily    #Hernia s/p Hernia repair (2016).  - No abdominal distension  - Monitor BM   - No signs of obstruction    #Progress Note Handoff  Pending (specify):  MRI and EEG read, follow up blood cultures, echo  Family discussion: house staff updated pt family  Disposition: 4B, anticipated 24 hrs

## 2022-03-29 NOTE — PROGRESS NOTE ADULT - SUBJECTIVE AND OBJECTIVE BOX
Patient is a 81y old Female who presents with a chief complaint of AMS. Primary diagnosis of Change in mental status. Today is hospital day 2.     INTERVAL HPI AND OVERNIGHT EVENTS:  This morning patient was seen and examined at bedside, resting comfortably in bed.    No acute or major events overnight.    Subjective complaints: patient denies any abdominal pain, chest pain or shortness of breath, and is very confused why she is in the hospital.     PAST MEDICAL & SURGICAL HISTORY  Breast CA  S/P surgery and on hormone therapy    HTN (hypertension)    H/O mastectomy, right    Status post knee replacement  B/L    SOCIAL HISTORY:  Social History:  Denies, smoking, drinking, drugs (27 Mar 2022 18:03)      ALLERGIES:  No Known Allergies    MEDICATIONS:  STANDING MEDICATIONS  amLODIPine   Tablet 5 milliGRAM(s) Oral daily  anastrozole 1 milliGRAM(s) Oral daily  aspirin  chewable 81 milliGRAM(s) Oral daily  atorvastatin 40 milliGRAM(s) Oral at bedtime  enoxaparin Injectable 40 milliGRAM(s) SubCutaneous every 24 hours  pantoprazole    Tablet 40 milliGRAM(s) Oral before breakfast  potassium chloride  20 mEq/100 mL IVPB 20 milliEquivalent(s) IV Intermittent every 2 hours    PRN MEDICATIONS  melatonin 3 milliGRAM(s) Oral at bedtime PRN    VITALS:   T(F): 99.1  HR: 70  BP: 156/73  RR: 18  SpO2: 95%    PHYSICAL EXAM:  GENERAL: NAD, well-groomed, well-developed  HEAD:  Atraumatic, Normocephalic  EYES: EOMI  NECK: Supple  NERVOUS SYSTEM:  Alert & Oriented X2 (to name and place, does not know the year or president)  CHEST/LUNG: Clear to auscultation bilaterally; No rales, rhonchi, wheezing, or rubs  HEART: Regular rate and rhythm; No murmurs, rubs, or gallops  ABDOMEN: Soft, Nondistended; Bowel sounds present, +umbilical hernia with mild tenderness to palpation over RUQ and RLQ   EXTREMITIES:  2+ Peripheral Pulses, No clubbing, cyanosis, or edema  LYMPH: No lymphadenopathy noted  SKIN: No rashes or lesions    LABS:                        12.6   13.47 )-----------(       ( 29 Mar 2022 05:23 )             39.4     03-29    148<H>  |  111<H>  |  18  ----------------------------<  100<H>  3.4<L>   |  22  |  1.2    Ca    9.1      29 Mar 2022 05:23  Mg     2.0         TPro  6.7  /  Alb  4.0  /  TBili  1.3<H>  /  DBili  x   /  AST  15  /  ALT  7   /  AlkPhos  116<H>        Urinalysis Basic - ( 27 Mar 2022 10:11 )    Color: Light Yellow / Appearance: Clear / S.020 / pH: x  Gluc: x / Ketone: Negative  / Bili: Negative / Urobili: <2 mg/dL   Blood: x / Protein: 30 mg/dL / Nitrite: Negative   Leuk Esterase: Negative / RBC: 2 /HPF / WBC 2 /HPF   Sq Epi: x / Non Sq Epi: 1 /HPF / Bacteria: Negative      Culture - Urine (collected 27 Mar 2022 12:42)  Source: Clean Catch Clean Catch (Midstream)  Final Report (29 Mar 2022 08:37):    <10,000 CFU/mL Normal Urogenital Dedra      CARDIAC MARKERS ( 28 Mar 2022 04:30 )  x     / 0.01 ng/mL / x     / x     / x      CARDIAC MARKERS ( 27 Mar 2022 11:51 )  x     / 0.02 ng/mL / x     / x     / x          RADIOLOGY:    - MR BRAIN NC - 2022    IMPRESSION:   Only the DWI sequence was performed, which is nondiagnostic due to extensive motion artifact.    - XR KUB - 2022    IMPRESSION:  Nonobstructive bowel gas pattern. Degenerative and scoliotic changes amongst the spine. Bilateral total hip arthroplasties. Excreted contrast noted within the pelvis within the urinary bladder.    - CT BRAIN - 2022  FINDINGS:  There is no CT evidence of acute transcortical infarct. Age-related involutional changes and chronic microvascular ischemic changes. Chronic infarcts involving the superior right parietal lobe, left occipital lobe, and bilateral cerebellar hemispheres. Chronic lacunar infarcts involving the right corona radiata and bilateral thalami.             Patient is a 81y old Female who presents with a chief complaint of AMS. Primary diagnosis of Change in mental status. Today is hospital day 2.     INTERVAL HPI AND OVERNIGHT EVENTS:  This morning patient was seen and examined at bedside, resting comfortably in bed.    No acute or major events overnight.    Subjective complaints: patient denies any abdominal pain, chest pain or shortness of breath, patient is very confused why she is in the hospital.     PAST MEDICAL & SURGICAL HISTORY  Breast CA  S/P surgery and on hormone therapy    HTN (hypertension)    H/O mastectomy, right    Status post knee replacement  B/L    SOCIAL HISTORY:  Social History:  Denies, smoking, drinking, drugs (27 Mar 2022 18:03)    ALLERGIES:  No Known Allergies    MEDICATIONS:  STANDING MEDICATIONS  amLODIPine   Tablet 5 milliGRAM(s) Oral daily  anastrozole 1 milliGRAM(s) Oral daily  aspirin  chewable 81 milliGRAM(s) Oral daily  atorvastatin 40 milliGRAM(s) Oral at bedtime  enoxaparin Injectable 40 milliGRAM(s) SubCutaneous every 24 hours  pantoprazole    Tablet 40 milliGRAM(s) Oral before breakfast  potassium chloride  20 mEq/100 mL IVPB 20 milliEquivalent(s) IV Intermittent every 2 hours    PRN MEDICATIONS  melatonin 3 milliGRAM(s) Oral at bedtime PRN    VITALS:   T(F): 99.1  HR: 70  BP: 156/73  RR: 18  SpO2: 95%    PHYSICAL EXAM:  GENERAL: NAD, well-groomed, well-developed  HEAD:  Atraumatic, Normocephalic  EYES: EOMI  NECK: Supple  NERVOUS SYSTEM:  Alert & Oriented X2 (to name and place, does not know the year or president)  CHEST/LUNG: Clear to auscultation bilaterally; No rales, rhonchi, wheezing, or rubs  HEART: Regular rate and rhythm; No murmurs, rubs, or gallops  ABDOMEN: Soft, Nondistended; Bowel sounds present, +umbilical hernia with mild tenderness to palpation over RUQ and RLQ   EXTREMITIES:  2+ Peripheral Pulses, No clubbing, cyanosis, or edema  LYMPH: No lymphadenopathy noted  SKIN: No rashes or lesions    LABS:                        12.6   13.47 )-----------(       ( 29 Mar 2022 05:23 )             39.4     03-29    148<H>  |  111<H>  |  18  ----------------------------<  100<H>  3.4<L>   |  22  |  1.2    Ca    9.1      29 Mar 2022 05:23  Mg     2.0         TPro  6.7  /  Alb  4.0  /  TBili  1.3<H>  /  DBili  x   /  AST  15  /  ALT  7   /  AlkPhos  116<H>      Urinalysis Basic - ( 27 Mar 2022 10:11 )    Color: Light Yellow / Appearance: Clear / S.020 / pH: x  Gluc: x / Ketone: Negative  / Bili: Negative / Urobili: <2 mg/dL   Blood: x / Protein: 30 mg/dL / Nitrite: Negative   Leuk Esterase: Negative / RBC: 2 /HPF / WBC 2 /HPF   Sq Epi: x / Non Sq Epi: 1 /HPF / Bacteria: Negative    Culture - Urine (collected 27 Mar 2022 12:42)  Source: Clean Catch Clean Catch (Midstream)  Final Report (29 Mar 2022 08:37):    <10,000 CFU/mL Normal Urogenital Dedra    CARDIAC MARKERS ( 28 Mar 2022 04:30 )  x     / 0.01 ng/mL / x     / x     / x      CARDIAC MARKERS ( 27 Mar 2022 11:51 )  x     / 0.02 ng/mL / x     / x     / x        RADIOLOGY:    - MR BRAIN NC - 2022    IMPRESSION:  Nondiagnostic incomplete exam. The patient was unable to tolerate the scan. Only the DWI sequence was performed, which is nondiagnostic due to extensive motion artifact.    - XR KUB - 2022    IMPRESSION:  Nonobstructive bowel gas pattern. Degenerative and scoliotic changes amongst the spine. Bilateral total hip arthroplasties. Excreted contrast noted within the pelvis within the urinary bladder.    - CT BRAIN - 2022  FINDINGS:  There is no CT evidence of acute transcortical infarct. Age-related involutional changes and chronic microvascular ischemic changes. Chronic infarcts involving the superior right parietal lobe, left occipital lobe, and bilateral cerebellar hemispheres. Chronic lacunar infarcts involving the right corona radiata and bilateral thalami.

## 2022-03-29 NOTE — PROGRESS NOTE ADULT - SUBJECTIVE AND OBJECTIVE BOX
Pt seen and examined at bedside. No CP or SOB. No overnight events    PAST MEDICAL & SURGICAL HISTORY:  Breast CA  S/P surgery and on hormone therapy    HTN (hypertension)    H/O mastectomy, right    Status post knee replacement  B/L        VITAL SIGNS (Last 24 hrs):  T(C): 37.3 (03-29-22 @ 08:14), Max: 37.3 (03-29-22 @ 08:14)  HR: 70 (03-29-22 @ 08:14) (70 - 100)  BP: 156/73 (03-29-22 @ 08:14) (120/86 - 156/73)  RR: 18 (03-29-22 @ 08:14) (18 - 18)  SpO2: 95% (03-28-22 @ 23:59) (95% - 95%)  Wt(kg): --  Daily     Daily     I&O's Summary    28 Mar 2022 07:01  -  29 Mar 2022 07:00  --------------------------------------------------------  IN: 420 mL / OUT: 100 mL / NET: 320 mL        PHYSICAL EXAM:  GENERAL: NAD, well-developed  HEAD:  Atraumatic, Normocephalic  EYES: EOMI, PERRLA, conjunctiva and sclera clear  NECK: Supple, No JVD  CHEST/LUNG: Clear to auscultation bilaterally; No wheeze  HEART: Regular rate and rhythm; No murmurs, rubs, or gallops  ABDOMEN: Soft, Nontender, Nondistended; Bowel sounds present  EXTREMITIES:  2+ Peripheral Pulses, No clubbing, cyanosis, or edema  PSYCH: AAOx2  NEUROLOGY: non-focal  SKIN: No rashes or lesions    Labs Reviewed  Spoke to patient in regards to abnormal labs.    CBC Full  -  ( 29 Mar 2022 05:23 )  WBC Count : 13.47 K/uL  Hemoglobin : 12.6 g/dL  Hematocrit : 39.4 %  Platelet Count - Automated : 202 K/uL  Mean Cell Volume : 94.9 fL  Mean Cell Hemoglobin : 30.4 pg  Mean Cell Hemoglobin Concentration : 32.0 g/dL  Auto Neutrophil # : x  Auto Lymphocyte # : x  Auto Monocyte # : x  Auto Eosinophil # : x  Auto Basophil # : x  Auto Neutrophil % : x  Auto Lymphocyte % : x  Auto Monocyte % : x  Auto Eosinophil % : x  Auto Basophil % : x    BMP:    03-29 @ 05:23    Blood Urea Nitrogen - 18  Calcium - 9.1  Carbond Dioxide - 22  Chloride - 111  Creatinine - 1.2  Glucose - 100  Potassium - 3.4  Sodium - 148      Hemoglobin A1c -     Urine Culture:  03-27 @ 12:42 Urine culture: --    Culture Results:   <10,000 CFU/mL Normal Urogenital Dedra  Method Type: --  Organism: --  Organism Identification: --  Specimen Source: Clean Catch Clean Catch (Midstream)        COVID Labs  CRP:    Procalcitonin, Serum: 0.07 ng/mL (03-28-22 @ 04:30)    D-Dimer:            Imaging reviewed independently and reviewed read  < from: Xray Kidney Ureter Bladder (03.28.22 @ 14:04) >  Nonobstructive bowel gas pattern. Degenerative and scoliotic changes   amongst the spine. Bilateral total hip arthroplasties. Excreted contrast   noted within the pelvis within the urinary bladder.    < end of copied text >        MEDICATIONS  (STANDING):  amLODIPine   Tablet 5 milliGRAM(s) Oral daily  anastrozole 1 milliGRAM(s) Oral daily  aspirin  chewable 81 milliGRAM(s) Oral daily  atorvastatin 40 milliGRAM(s) Oral at bedtime  enoxaparin Injectable 40 milliGRAM(s) SubCutaneous every 24 hours  pantoprazole    Tablet 40 milliGRAM(s) Oral before breakfast    MEDICATIONS  (PRN):  melatonin 3 milliGRAM(s) Oral at bedtime PRN Insomnia

## 2022-03-29 NOTE — PROGRESS NOTE ADULT - ASSESSMENT
Hospital Course   80 yo woman with a PMHx of HTN, R breast CA s/p lumpectomy 6-7 yrs ago, admitted for confusion/AMS, She was sent in by her sons after being noted to have progressive confusion for the last 3 days. Other than her confusion and lack of focus/coherence no other symptoms were reported. She notes that she was confused in the preceding days though cannot attest to why. While she initially appears lucid further questions unmasks confusion, thinking she has been in the hospital for days, unable to recall some previous details.     # Altered Mental Status secondary to Metabolic encephalopathy vs  advancing dementia vs. Post-ictal State   - CT H showed chronic microvascular changes/chronic infarcts; no acute pathology --> progressive vacular dementia would explain the gradual onset of symptoms described by family at bedside   - CTA H/N largely normal   - Neuro Consult Appreciated: If MR Head NC shows new stroke --> EPS Consult for possible loop recorder   - MRI Head NC to definitively rule out stroke--> Nondiagnostic incomplete exam, pt was unable to tolerate the scan.  - f/u EEG to r/o postictal state ---> performed; f/u read   - f/u RPR, Vit B12 and Folate --> B12 256 (wnl)  - PT/OT consult   - c/w ASA   - c/w Atorvastatin 40mg QD per neuro   - f/u Echo    # Leukocytosis - unknown origin   # Umbilical Hernia   - UA (-), urethritis/cystitis/pyelo ruled out   - f/u blood cultures - WBC (13.69) 3/28/22 --> 13.47 today   - If patient is febrile or unstable --> consider CT AP given umbilical hernia with slight tenderness on exam   - KUB 3/28 --> non-obstructive pattern     #HTN   - c/w Amlodipine 5mg PO daily    #Breast Cancer in remission  - c/w Anastrazole 1mg PO daily    #Hernia s/p Hernia repair (2016).  - No abdominal distension  - Monitor BM   - No signs of obstruction    #Progress Note Handoff  Pending (specify):   EEG read, follow up blood cultures, echo  Family discussion: house staff updated pt family  Disposition: 4B, anticipated 24 hrs     Hospital Course   82 yo woman with a PMHx of HTN, R breast CA s/p lumpectomy 6-7 yrs ago, admitted for confusion/AMS, She was sent in by her sons after being noted to have progressive confusion for the last 3 days. Other than her confusion and lack of focus/coherence no other symptoms were reported. She notes that she was confused in the preceding days though cannot attest to why. While she initially appears lucid further questions unmasks confusion, thinking she has been in the hospital for days, unable to recall some previous details.     # Altered Mental Status secondary to Metabolic encephalopathy vs  advancing dementia vs. Post-ictal State   - CT H showed chronic microvascular changes/chronic infarcts; no acute pathology --> progressive vacular dementia would explain the gradual onset of symptoms described by family at bedside   - CTA H/N largely normal   - EEG 3/28 ruled out seizure; No epileptiform activity, but showed generalized slowing   - Neuro Consult Appreciated: IF MR Head NC shows new stroke --> EPS Consult for possible loop recorder; will transfer to tele in the meantime   - MRI Head NC to definitively rule out stroke--> Nondiagnostic incomplete exam, pt was unable to tolerate the scan ---> neuro will round on 3/29 and inform us whether a repeat is warranted   - f/u RPR and Folate; B12 256 (wnl)  - PT/OT consult   - c/w ASA   - c/w Atorvastatin 40mg QD per neuro   - f/u Echo  - Transfer to tele on 3/29 to identify any underlying arrhythmia    # Leukocytosis - unknown origin, possibly hemoconcentration   # Umbilical Hernia   - UA (-), urethritis/cystitis/pyelo ruled out   - f/u blood cultures - WBC (13.69) 3/28/22 --> 13.47 today   - If patient is febrile or unstable --> consider CT AP given umbilical hernia with slight tenderness on exam   - KUB 3/28 --> non-obstructive pattern   - f/u repeat CBC 3/29 4pm after IVF       #HTN   - c/w Amlodipine 5mg PO daily    #Breast Cancer in remission  - c/w Anastrazole 1mg PO daily    #Hernia s/p Hernia repair (2016).  - No abdominal distension  - Monitor BM   - No signs of obstruction    #Progress Note Handoff  Pending (specify):   EEG read, follow up blood cultures, echo  Family discussion: house staff updated pt family  Disposition: 4B, anticipated 24 hrs

## 2022-03-29 NOTE — CONSULT NOTE ADULT - SUBJECTIVE AND OBJECTIVE BOX
HPI:  80 yo F with pmhx of HTN, R breast CA s/p lumpectomy 6-7 yrs ago, brought in to the ED for AMS for 2 days . Patient resides with daughter Brynn, and had son recently come visit 3d ago from out of town. Most of the history was obtained from the Son, Dillon who is at bedside. He states that his mother has shown signs of confusion 3 days ago. He noticed that his mother is increasingly fidgety, loses focus when performing her ADL, had trouble forming sentences, and repeating herself. She particularly kept repeating the last four digits of her SSN without any context.    She typically ambulates with a walker, and takes care of her own hygiene. However it is unknown how long this has been going on as per the son as his sister is the one that lives with her.     Son denies any symptoms. No fever/chills, nasal discharge/sore throat, CP, palpitations, SOB, cough, abd pain, NVD, dysuria, hematuria, new joint pain, FND, rashes, trauma.    In the ED vitals wnls. Labs significant for hypokalemia. Imaging including CTH, CTA and CXR show no acute pathology.  (27 Mar 2022 18:03)      PAST MEDICAL & SURGICAL HISTORY:  Breast CA  S/P surgery and on hormone therapy    HTN (hypertension)    H/O mastectomy, right    Status post knee replacement  B/L        Hospital Course:  CT chronic lacunar infarct. For MRI and EEG.  At baseline amb with a quad cane or walker up to 150 ft.  She stood with PT for 5 seconds max assist of 2 people.    TODAY'S SUBJECTIVE & REVIEW OF SYMPTOMS:     Constitutional WNL   Cardio WNL   Resp WNL   GI WNL  Heme WNL  Endo WNL  Skin WNL  MSK WNL  Neuro WNL  Cognitive WNL  Psych WNL      MEDICATIONS  (STANDING):  amLODIPine   Tablet 5 milliGRAM(s) Oral daily  anastrozole 1 milliGRAM(s) Oral daily  aspirin  chewable 81 milliGRAM(s) Oral daily  atorvastatin 40 milliGRAM(s) Oral at bedtime  dextrose 5%. 1000 milliLiter(s) (50 mL/Hr) IV Continuous <Continuous>  enoxaparin Injectable 40 milliGRAM(s) SubCutaneous every 24 hours  pantoprazole    Tablet 40 milliGRAM(s) Oral before breakfast  potassium chloride  20 mEq/100 mL IVPB 20 milliEquivalent(s) IV Intermittent every 2 hours    MEDICATIONS  (PRN):  melatonin 3 milliGRAM(s) Oral at bedtime PRN Insomnia      FAMILY HISTORY:      Allergies    No Known Allergies    Intolerances        SOCIAL HISTORY:    [  ] Etoh  [  ] Smoking  [  ] Substance abuse     Home Environment:  [  ] Home Alone  [x  ] Lives with Family-dtr  [  ] Home Health Aid    Dwelling:  [  ] Apartment  [ x ] Private House  [  ] Adult Home  [  ] Skilled Nursing Facility      [  ] Short Term  [  ] Long Term  [  ] Stairs       Elevator [  ]    FUNCTIONAL STATUS PTA: (Check all that apply)  Ambulation: [ x  ]Independent    [  ] Dependent     [  ] Non-Ambulatory  Assistive Device: [  ] SA Cane  [ x ]  Q Cane  [  ] Walker  [  ]  Wheelchair  ADL : [ x ] Independent  [  ]  Dependent       Vital Signs Last 24 Hrs  T(C): 37.3 (29 Mar 2022 08:14), Max: 37.3 (29 Mar 2022 08:14)  T(F): 99.1 (29 Mar 2022 08:14), Max: 99.1 (29 Mar 2022 08:14)  HR: 70 (29 Mar 2022 08:14) (70 - 100)  BP: 156/73 (29 Mar 2022 08:14) (120/86 - 156/73)  BP(mean): --  RR: 18 (29 Mar 2022 08:14) (18 - 18)  SpO2: 95% (28 Mar 2022 23:59) (95% - 95%)      PHYSICAL EXAM: Alert & Oriented X3  GENERAL: NAD, well-groomed, well-developed  HEAD:  Atraumatic, Normocephalic  EYES: EOMI, PERRLA, conjunctiva and sclera clear  NECK: Supple, No JVD, Normal thyroid  CHEST/LUNG: Clear bilaterally; No rales, rhonchi, wheezing, or rubs  HEART: Regular rate and rhythm; No murmurs, rubs, or gallops  ABDOMEN: Soft, Nontender, Nondistended; Bowel sounds present  EXTREMITIES:  2+ Peripheral Pulses, No clubbing, cyanosis, or edema    NERVOUS SYSTEM:  Cranial Nerves 2-12 intact [  ] Abnormal  [  ]  ROM: WFL all extremities [  ]  Abnormal [  ]  Motor Strength: WFL all extremities  [  ]  Abnormal [x  ]   UE's 4/5; LLE 3/5; RLE 4/5  Sensation: intact to light touch [ x ] Abnormal [  ]  Reflexes: Symmetric [  ]  Abnormal [  ]    FUNCTIONAL STATUS:  Bed Mobility: Independent [  ]  Supervision [  ]  Needs Assistance [  ]  N/A [  ]  Transfers: Independent [  ]  Supervision [  ]  Needs Assistance [  ]  N/A [  ]   Ambulation: Independent [  ]  Supervision [  ]  Needs Assistance [  ]  N/A [  ]  ADL: Independent [  ] Requires Assistance [  ] N/A [  ]      LABS:                        12.6   13.47 )-----------( 202      ( 29 Mar 2022 05:23 )             39.4     03-29    148<H>  |  111<H>  |  18  ----------------------------<  100<H>  3.4<L>   |  22  |  1.2    Ca    9.1      29 Mar 2022 05:23  Mg     2.0     03-29    TPro  6.7  /  Alb  4.0  /  TBili  1.3<H>  /  DBili  x   /  AST  15  /  ALT  7   /  AlkPhos  116<H>  03-29          RADIOLOGY & ADDITIONAL STUDIES:    Assesment:

## 2022-03-30 LAB
ALBUMIN SERPL ELPH-MCNC: 3.6 G/DL — SIGNIFICANT CHANGE UP (ref 3.5–5.2)
ALP SERPL-CCNC: 97 U/L — SIGNIFICANT CHANGE UP (ref 30–115)
ALT FLD-CCNC: 6 U/L — SIGNIFICANT CHANGE UP (ref 0–41)
ANION GAP SERPL CALC-SCNC: 15 MMOL/L — HIGH (ref 7–14)
AST SERPL-CCNC: 13 U/L — SIGNIFICANT CHANGE UP (ref 0–41)
BILIRUB SERPL-MCNC: 1 MG/DL — SIGNIFICANT CHANGE UP (ref 0.2–1.2)
BUN SERPL-MCNC: 22 MG/DL — HIGH (ref 10–20)
CALCIUM SERPL-MCNC: 8.6 MG/DL — SIGNIFICANT CHANGE UP (ref 8.5–10.1)
CHLORIDE SERPL-SCNC: 104 MMOL/L — SIGNIFICANT CHANGE UP (ref 98–110)
CO2 SERPL-SCNC: 20 MMOL/L — SIGNIFICANT CHANGE UP (ref 17–32)
CREAT SERPL-MCNC: 1.4 MG/DL — SIGNIFICANT CHANGE UP (ref 0.7–1.5)
EGFR: 38 ML/MIN/1.73M2 — LOW
GLUCOSE BLDC GLUCOMTR-MCNC: 110 MG/DL — HIGH (ref 70–99)
GLUCOSE SERPL-MCNC: 172 MG/DL — HIGH (ref 70–99)
HCT VFR BLD CALC: 36 % — LOW (ref 37–47)
HGB BLD-MCNC: 11.7 G/DL — LOW (ref 12–16)
MAGNESIUM SERPL-MCNC: 2 MG/DL — SIGNIFICANT CHANGE UP (ref 1.8–2.4)
MCHC RBC-ENTMCNC: 30.6 PG — SIGNIFICANT CHANGE UP (ref 27–31)
MCHC RBC-ENTMCNC: 32.5 G/DL — SIGNIFICANT CHANGE UP (ref 32–37)
MCV RBC AUTO: 94.2 FL — SIGNIFICANT CHANGE UP (ref 81–99)
NRBC # BLD: 0 /100 WBCS — SIGNIFICANT CHANGE UP (ref 0–0)
PLATELET # BLD AUTO: 171 K/UL — SIGNIFICANT CHANGE UP (ref 130–400)
POTASSIUM SERPL-MCNC: 3.1 MMOL/L — LOW (ref 3.5–5)
POTASSIUM SERPL-SCNC: 3.1 MMOL/L — LOW (ref 3.5–5)
PROT SERPL-MCNC: 6.1 G/DL — SIGNIFICANT CHANGE UP (ref 6–8)
RBC # BLD: 3.82 M/UL — LOW (ref 4.2–5.4)
RBC # FLD: 14.6 % — HIGH (ref 11.5–14.5)
SARS-COV-2 RNA SPEC QL NAA+PROBE: SIGNIFICANT CHANGE UP
SODIUM SERPL-SCNC: 139 MMOL/L — SIGNIFICANT CHANGE UP (ref 135–146)
WBC # BLD: 10.48 K/UL — SIGNIFICANT CHANGE UP (ref 4.8–10.8)
WBC # FLD AUTO: 10.48 K/UL — SIGNIFICANT CHANGE UP (ref 4.8–10.8)

## 2022-03-30 PROCEDURE — 93306 TTE W/DOPPLER COMPLETE: CPT | Mod: 26

## 2022-03-30 PROCEDURE — 99233 SBSQ HOSP IP/OBS HIGH 50: CPT

## 2022-03-30 RX ORDER — POTASSIUM CHLORIDE 20 MEQ
40 PACKET (EA) ORAL ONCE
Refills: 0 | Status: COMPLETED | OUTPATIENT
Start: 2022-03-30 | End: 2022-03-30

## 2022-03-30 RX ORDER — POTASSIUM CHLORIDE 20 MEQ
20 PACKET (EA) ORAL
Refills: 0 | Status: COMPLETED | OUTPATIENT
Start: 2022-03-30 | End: 2022-03-30

## 2022-03-30 RX ADMIN — Medication 40 MILLIEQUIVALENT(S): at 11:13

## 2022-03-30 RX ADMIN — ATORVASTATIN CALCIUM 40 MILLIGRAM(S): 80 TABLET, FILM COATED ORAL at 22:04

## 2022-03-30 RX ADMIN — PANTOPRAZOLE SODIUM 40 MILLIGRAM(S): 20 TABLET, DELAYED RELEASE ORAL at 10:17

## 2022-03-30 RX ADMIN — ENOXAPARIN SODIUM 40 MILLIGRAM(S): 100 INJECTION SUBCUTANEOUS at 22:04

## 2022-03-30 RX ADMIN — Medication 40 MILLIEQUIVALENT(S): at 16:27

## 2022-03-30 RX ADMIN — AMLODIPINE BESYLATE 5 MILLIGRAM(S): 2.5 TABLET ORAL at 06:22

## 2022-03-30 RX ADMIN — ANASTROZOLE 1 MILLIGRAM(S): 1 TABLET ORAL at 11:13

## 2022-03-30 RX ADMIN — Medication 81 MILLIGRAM(S): at 11:13

## 2022-03-30 NOTE — PROGRESS NOTE ADULT - ASSESSMENT
82 yo F with pmhx of HTN, R breast CA s/p lumpectomy 6-7 yrs ago, brought in to the ED for AMS for 2 days .    # Progressive vascular  dementia   - CT Head No Cont (03.29.22 @ 16:54) >No significant change since recent CT of 3/27/2022.Multiple scattered infarcts someof which are age indeterminate without  evidence of hemorrhage.Moderate chronic microvascular type changes.  - EEG (03.28.22 @ 12:00) >Consistent with diffuse cerebral electrophysiological dysfunction.  Secondary to non- specific cause.  - MR Head No Cont (03.28.22 @ 22:23) >The patient was unable to tolerate the scan. Only the DWI sequence was performed, which is nondiagnostic due to extensive motion artifact.  - LDL Cholesterol Calculated: 132 mg/dL (03.28.22 @ 04:30)  - c/w ASA, Statin  - F/u 2D echo    # Hypokalemia  - replete k    # Leukocytosis - resolved   - blood Culture Results: No growth to date. (03.28.22 @ 16:00)  - UA- neg  -  Xray Chest 1 View- PORTABLE-Urgent (03.27.22 @ 13:33) >No radiographic evidence of acute cardiopulmonary disease.    # Hypertension  - c/w norvasc    # H/o breast Cancer in remission  - c/w anastrazole     # DVT prophylaxis    # Pending: F/u 2 D echo, covid   # Discussed plan of care with patients son  # Disposition: STR

## 2022-03-30 NOTE — PROGRESS NOTE ADULT - ASSESSMENT
HOSPITAL COURSE:    82 yo woman with a PMHx of HTN, R breast CA s/p lumpectomy 6-7 yrs ago, admitted for confusion/AMS, She was sent in by her sons after being noted to have progressive confusion for the last 3 days. Other than her confusion and lack of focus/coherence no other symptoms were reported.     in ED: Vitals were normal; CTH and CTA and CXR were unremarkable.     Patient admitted for Metabolic Encephalopathy.   Neurology closely followed patient on this visit.   EEG ruled out seizure. MRI was nondiagnostic as patient was moving too much. Repeat CTH per neuro was stable, with scattered age indeterminate infarcts.   No other foci of infection or arrhythmias noted that would explain confusion. Patient's worsening mentation likely due to vascular dementia, based on CT Head.     Medically cleared for discharge 3/30     ASSESSMENT & PLAN:    # Altered Mental Status secondary to Metabolic encephalopathy vs  advancing dementia vs. Post-ictal State   - CT H showed chronic microvascular changes/chronic infarcts; no acute pathology --> progressive vacular dementia would explain the gradual onset of symptoms described by family at bedside   - CTA H/N largely normal   - EEG 3/28 ruled out seizure; No epileptiform activity, but showed generalized slowing   - Neuro Consult Appreciated: IF MR Head NC shows new stroke --> EPS Consult for possible loop recorder; will transfer to tele in the meantime   - MRI Head NC to definitively rule out stroke--> Nondiagnostic incomplete exam, pt was unable to tolerate the scan ---> neuro will round on 3/29 and inform us whether a repeat is warranted   - f/u Folate; B12 256 (wnl); RPR (-)   - PT/OT consult   - c/w ASA (home med)   - c/w Atorvastatin 40mg QD per neuro (started this visit)  - f/u Echo however very low suspicion for thrombus/PFO that would      # Leukocytosis - Resolved   - UA (-), urethritis/cystitis/pyelo ruled out   - f/u blood cultures - WBC (13.69) 3/28/22 --> 10K 3/30; likely hemoconcentration since it resolved with IVF   - NO infectious focus apparent     # Umbilical Hernia   - KUB 3/28 --> non-obstructive pattern; stab    #HTN   - c/w Amlodipine 5mg PO daily    #Breast Cancer in remission  - c/w Anastrazole 1mg PO daily    #Hernia s/p Hernia repair (2016).  - No abdominal distension  - Monitor BM   - No signs of obstruction      DVT ppx: Lovenox  GI ppx: Not indicated  Diet: Dash/TLC   Code Status: Full   Dispo: prepare for d/c in 24 hours

## 2022-03-30 NOTE — PROGRESS NOTE ADULT - SUBJECTIVE AND OBJECTIVE BOX
BHAVIN CHATMAN 81y Female  MRN#: [id number]   Hospital Day: 3d    SUBJECTIVE  Patient is a 81y old Female who presents with a chief complaint of AMS (29 Mar 2022 10:46)  Currently admitted to medicine with the primary diagnosis of Change in mental status      INTERVAL HPI AND OVERNIGHT EVENTS:  Patient was examined and seen at bedside. This morning she is resting comfortably in bed and reports no issues or overnight events.      OBJECTIVE  PAST MEDICAL & SURGICAL HISTORY  Breast CA  S/P surgery and on hormone therapy    HTN (hypertension)    H/O mastectomy, right    Status post knee replacement  B/L      ALLERGIES:  No Known Allergies    MEDICATIONS:  STANDING MEDICATIONS  amLODIPine   Tablet 5 milliGRAM(s) Oral daily  anastrozole 1 milliGRAM(s) Oral daily  aspirin  chewable 81 milliGRAM(s) Oral daily  atorvastatin 40 milliGRAM(s) Oral at bedtime  dextrose 5%. 1000 milliLiter(s) IV Continuous <Continuous>  enoxaparin Injectable 40 milliGRAM(s) SubCutaneous every 24 hours  pantoprazole    Tablet 40 milliGRAM(s) Oral before breakfast  potassium chloride   Powder 40 milliEquivalent(s) Oral once    PRN MEDICATIONS  melatonin 3 milliGRAM(s) Oral at bedtime PRN      PHYSICAL EXAM:  GENERAL: NAD, well-groomed, well-developed  HEAD:  Atraumatic, Normocephalic  EYES: EOMI  NECK: Supple  NERVOUS SYSTEM:  Alert & Oriented X2 (to name and place, does not know the year or president)  CHEST/LUNG: Clear to auscultation bilaterally; No rales, rhonchi, wheezing, or rubs  HEART: Regular rate and rhythm; No murmurs, rubs, or gallops  ABDOMEN: Soft, Nondistended; Bowel sounds present, +umbilical hernia  EXTREMITIES:  2+ Peripheral Pulses, No clubbing, cyanosis, or edema  LYMPH: No lymphadenopathy noted  SKIN: No rashes or lesions    VITAL SIGNS: Last 24 Hours  T(C): 37 (30 Mar 2022 08:22), Max: 37 (30 Mar 2022 08:22)  T(F): 98.6 (30 Mar 2022 08:22), Max: 98.6 (30 Mar 2022 08:22)  HR: 75 (30 Mar 2022 08:22) (72 - 99)  BP: 146/78 (30 Mar 2022 08:22) (123/76 - 146/78)  BP(mean): --  RR: 18 (30 Mar 2022 08:22) (18 - 18)  SpO2: --    LABS:                        11.7   10.48 )-----------( 171      ( 30 Mar 2022 05:30 )             36.0     03-30    139  |  104  |  22<H>  ----------------------------<  172<H>  3.1<L>   |  20  |  1.4    Ca    8.6      30 Mar 2022 05:30  Mg     2.0     03-30    TPro  6.1  /  Alb  3.6  /  TBili  1.0  /  DBili  x   /  AST  13  /  ALT  6   /  AlkPhos  97  03-30              Culture - Blood (collected 28 Mar 2022 16:00)  Source: .Blood Blood-Venous  Preliminary Report (30 Mar 2022 01:02):    No growth to date.    Culture - Blood (collected 28 Mar 2022 04:30)  Source: .Blood Blood  Preliminary Report (29 Mar 2022 22:02):    No growth to date.    Culture - Urine (collected 27 Mar 2022 12:42)  Source: Clean Catch Clean Catch (Midstream)  Final Report (29 Mar 2022 08:37):    <10,000 CFU/mL Normal Urogenital Dedra          RADIOLOGY:    < from: CT Head No Cont (03.29.22 @ 16:54) >    IMPRESSION:  No significant change since recent CT of 3/27/2022.    Multiple scattered infarcts someof which are age indeterminate without   evidence of hemorrhage.    Moderate chronic microvascular type changes.    < end of copied text >

## 2022-03-30 NOTE — PROGRESS NOTE ADULT - SUBJECTIVE AND OBJECTIVE BOX
Patient is a 81y old  Female who presents with a chief complaint of AMS (30 Mar 2022 10:22)    Patient was seen and examined.  Awake, alert  no new events    PAST MEDICAL & SURGICAL HISTORY:  Breast CA S/P surgery and on hormone therapy  HTN (hypertension)  H/O mastectomy, right  Status post knee replacement, B/L    Allergies  No Known Allergies    MEDICATIONS  (STANDING):  amLODIPine   Tablet 5 milliGRAM(s) Oral daily  anastrozole 1 milliGRAM(s) Oral daily  aspirin  chewable 81 milliGRAM(s) Oral daily  atorvastatin 40 milliGRAM(s) Oral at bedtime  dextrose 5%. 1000 milliLiter(s) (50 mL/Hr) IV Continuous <Continuous>  enoxaparin Injectable 40 milliGRAM(s) SubCutaneous every 24 hours  pantoprazole    Tablet 40 milliGRAM(s) Oral before breakfast    MEDICATIONS  (PRN):  melatonin 3 milliGRAM(s) Oral at bedtime PRN Insomnia    Vital Signs Last 24 Hrs  T(C): 37  T(F): 98.6  HR: 75  BP: 146/78  BP(mean): --  RR: 18  SpO2: 88%    O/E:  Awake, alert, not in distress.  HEENT: atraumatic, EOMI.  Chest: clear.  CVS: SIS2 +, no murmur.  P/A: Soft, BS+, umbilical hernia+  CNS: awake, alert, orientedx1  Ext: no edema feet.  Skin: no rash, no ulcers.  All systems reviewed positive findings as above.    POCT Blood Glucose.: 110 mg/dL (30 Mar 2022 07:28)                          11.7<L>  10.48 )-----------( 171      ( 30 Mar 2022 05:30 )             36.0<L>                        10.9<L>  12.15<H> )-----------( 177      ( 29 Mar 2022 16:00 )             33.7<L>    03-30    139  |  104  |  22<H>  ----------------------------<  172<H>  3.1<L>   |  20  |  1.4  03-29    135  |  102  |  19  ----------------------------<  276<H>  3.4<L>   |  20  |  1.3    Ca    8.6      30 Mar 2022 05:30  Ca    8.2<L>      29 Mar 2022 16:00  Ca    9.1      29 Mar 2022 05:23  Mg     2.0     03-30    TPro  6.1  /  Alb  3.6  /  TBili  1.0  /  DBili  x   /  AST  13  /  ALT  6   /  AlkPhos  97  03-30  TPro  5.9<L>  /  Alb  3.4<L>  /  TBili  0.9  /  DBili  x   /  AST  13  /  ALT  6   /  AlkPhos  90  03-29  TPro  6.7  /  Alb  4.0  /  TBili  1.3<H>  /  DBili  x   /  AST  15  /  ALT  7   /  AlkPhos  116<H>  03-29                Culture - Blood (collected 28 Mar 2022 16:00)  Source: .Blood Blood-Venous  Preliminary Report (30 Mar 2022 01:02):    No growth to date.    Culture - Blood (collected 28 Mar 2022 04:30)  Source: .Blood Blood  Preliminary Report (29 Mar 2022 22:02):    No growth to date.    Culture - Urine (collected 27 Mar 2022 12:42)  Source: Clean Catch Clean Catch (Midstream)  Final Report (29 Mar 2022 08:37):    <10,000 CFU/mL Normal Urogenital Dedra

## 2022-03-31 ENCOUNTER — TRANSCRIPTION ENCOUNTER (OUTPATIENT)
Age: 82
End: 2022-03-31

## 2022-03-31 VITALS
DIASTOLIC BLOOD PRESSURE: 79 MMHG | RESPIRATION RATE: 18 BRPM | TEMPERATURE: 99 F | SYSTOLIC BLOOD PRESSURE: 126 MMHG | HEART RATE: 87 BPM

## 2022-03-31 LAB
ALBUMIN SERPL ELPH-MCNC: 3.8 G/DL — SIGNIFICANT CHANGE UP (ref 3.5–5.2)
ALP SERPL-CCNC: 106 U/L — SIGNIFICANT CHANGE UP (ref 30–115)
ALT FLD-CCNC: 9 U/L — SIGNIFICANT CHANGE UP (ref 0–41)
ANION GAP SERPL CALC-SCNC: 18 MMOL/L — HIGH (ref 7–14)
AST SERPL-CCNC: 14 U/L — SIGNIFICANT CHANGE UP (ref 0–41)
BASOPHILS # BLD AUTO: 0.05 K/UL — SIGNIFICANT CHANGE UP (ref 0–0.2)
BASOPHILS NFR BLD AUTO: 0.5 % — SIGNIFICANT CHANGE UP (ref 0–1)
BILIRUB SERPL-MCNC: 1 MG/DL — SIGNIFICANT CHANGE UP (ref 0.2–1.2)
BUN SERPL-MCNC: 19 MG/DL — SIGNIFICANT CHANGE UP (ref 10–20)
CALCIUM SERPL-MCNC: 9.1 MG/DL — SIGNIFICANT CHANGE UP (ref 8.5–10.1)
CHLORIDE SERPL-SCNC: 108 MMOL/L — SIGNIFICANT CHANGE UP (ref 98–110)
CO2 SERPL-SCNC: 21 MMOL/L — SIGNIFICANT CHANGE UP (ref 17–32)
CREAT SERPL-MCNC: 1.3 MG/DL — SIGNIFICANT CHANGE UP (ref 0.7–1.5)
EGFR: 41 ML/MIN/1.73M2 — LOW
EOSINOPHIL # BLD AUTO: 0.12 K/UL — SIGNIFICANT CHANGE UP (ref 0–0.7)
EOSINOPHIL NFR BLD AUTO: 1.1 % — SIGNIFICANT CHANGE UP (ref 0–8)
GLUCOSE SERPL-MCNC: 106 MG/DL — HIGH (ref 70–99)
HCT VFR BLD CALC: 37.7 % — SIGNIFICANT CHANGE UP (ref 37–47)
HGB BLD-MCNC: 12.2 G/DL — SIGNIFICANT CHANGE UP (ref 12–16)
IMM GRANULOCYTES NFR BLD AUTO: 0.5 % — HIGH (ref 0.1–0.3)
LYMPHOCYTES # BLD AUTO: 1.16 K/UL — LOW (ref 1.2–3.4)
LYMPHOCYTES # BLD AUTO: 11 % — LOW (ref 20.5–51.1)
MAGNESIUM SERPL-MCNC: 2 MG/DL — SIGNIFICANT CHANGE UP (ref 1.8–2.4)
MCHC RBC-ENTMCNC: 30.7 PG — SIGNIFICANT CHANGE UP (ref 27–31)
MCHC RBC-ENTMCNC: 32.4 G/DL — SIGNIFICANT CHANGE UP (ref 32–37)
MCV RBC AUTO: 94.7 FL — SIGNIFICANT CHANGE UP (ref 81–99)
MONOCYTES # BLD AUTO: 0.72 K/UL — HIGH (ref 0.1–0.6)
MONOCYTES NFR BLD AUTO: 6.8 % — SIGNIFICANT CHANGE UP (ref 1.7–9.3)
NEUTROPHILS # BLD AUTO: 8.43 K/UL — HIGH (ref 1.4–6.5)
NEUTROPHILS NFR BLD AUTO: 80.1 % — HIGH (ref 42.2–75.2)
NRBC # BLD: 0 /100 WBCS — SIGNIFICANT CHANGE UP (ref 0–0)
PLATELET # BLD AUTO: 189 K/UL — SIGNIFICANT CHANGE UP (ref 130–400)
POTASSIUM SERPL-MCNC: 4.1 MMOL/L — SIGNIFICANT CHANGE UP (ref 3.5–5)
POTASSIUM SERPL-SCNC: 4.1 MMOL/L — SIGNIFICANT CHANGE UP (ref 3.5–5)
PROT SERPL-MCNC: 6.5 G/DL — SIGNIFICANT CHANGE UP (ref 6–8)
RBC # BLD: 3.98 M/UL — LOW (ref 4.2–5.4)
RBC # FLD: 14.5 % — SIGNIFICANT CHANGE UP (ref 11.5–14.5)
SODIUM SERPL-SCNC: 147 MMOL/L — HIGH (ref 135–146)
WBC # BLD: 10.53 K/UL — SIGNIFICANT CHANGE UP (ref 4.8–10.8)
WBC # FLD AUTO: 10.53 K/UL — SIGNIFICANT CHANGE UP (ref 4.8–10.8)

## 2022-03-31 PROCEDURE — 99239 HOSP IP/OBS DSCHRG MGMT >30: CPT

## 2022-03-31 PROCEDURE — 93010 ELECTROCARDIOGRAM REPORT: CPT

## 2022-03-31 PROCEDURE — 99497 ADVNCD CARE PLAN 30 MIN: CPT | Mod: 25

## 2022-03-31 RX ORDER — ATORVASTATIN CALCIUM 80 MG/1
1 TABLET, FILM COATED ORAL
Qty: 0 | Refills: 0 | DISCHARGE
Start: 2022-03-31

## 2022-03-31 RX ORDER — ASPIRIN/CALCIUM CARB/MAGNESIUM 324 MG
1 TABLET ORAL
Qty: 0 | Refills: 0 | DISCHARGE
Start: 2022-03-31

## 2022-03-31 RX ORDER — AMLODIPINE BESYLATE 2.5 MG/1
0 TABLET ORAL
Qty: 90 | Refills: 0 | DISCHARGE

## 2022-03-31 RX ORDER — AMLODIPINE BESYLATE 2.5 MG/1
1 TABLET ORAL
Qty: 90 | Refills: 0 | DISCHARGE

## 2022-03-31 RX ADMIN — ANASTROZOLE 1 MILLIGRAM(S): 1 TABLET ORAL at 11:55

## 2022-03-31 RX ADMIN — PANTOPRAZOLE SODIUM 40 MILLIGRAM(S): 20 TABLET, DELAYED RELEASE ORAL at 09:53

## 2022-03-31 RX ADMIN — AMLODIPINE BESYLATE 5 MILLIGRAM(S): 2.5 TABLET ORAL at 05:42

## 2022-03-31 RX ADMIN — Medication 81 MILLIGRAM(S): at 11:55

## 2022-03-31 NOTE — DISCHARGE NOTE PROVIDER - NSDCMRMEDTOKEN_GEN_ALL_CORE_FT
AMLODIPINE BESYLATE 5 MG TABS:   anastrozole 1 mg oral tablet: 1 tab(s) orally once a day  aspirin 81 mg oral tablet, chewable: 1 tab(s) orally once a day  atorvastatin 40 mg oral tablet: 1 tab(s) orally once a day (at bedtime)   AMLODIPINE BESYLATE 5 MG TABS: 1 tab(s) orally once a day  anastrozole 1 mg oral tablet: 1 tab(s) orally once a day  aspirin 81 mg oral tablet, chewable: 1 tab(s) orally once a day  atorvastatin 40 mg oral tablet: 1 tab(s) orally once a day (at bedtime)

## 2022-03-31 NOTE — DISCHARGE NOTE NURSING/CASE MANAGEMENT/SOCIAL WORK - NSDCPEFALRISK_GEN_ALL_CORE
For information on Fall & Injury Prevention, visit: [email or web address] OR  [email or web address] OR  https://www.cdc.gov/steadi/patient.html

## 2022-03-31 NOTE — DISCHARGE NOTE NURSING/CASE MANAGEMENT/SOCIAL WORK - PATIENT PORTAL LINK FT
You can access the FollowMyHealth Patient Portal offered by [facility] by registering at the following website: [email or web address]. By joining [facility]’s FollowMyHealth portal, you will also be able to view your health information using other applications (apps) compatible with our system.

## 2022-03-31 NOTE — PROGRESS NOTE ADULT - ASSESSMENT
HOSPITAL COURSE:    ASSESSMENT & PLAN: HOSPITAL COURSE:  82 yo woman with a PMHx of HTN, R breast CA s/p lumpectomy 6-7 yrs ago, admitted for confusion/AMS, She was sent in by her sons after being noted to have progressive confusion for the last 3 days. Other than her confusion and lack of focus/coherence no other symptoms were reported.     in ED: Vitals were normal; CTH and CTA and CXR were unremarkable.     Patient admitted for Metabolic Encephalopathy.   Neurology closely followed patient on this visit.   EEG ruled out seizure. MRI was nondiagnostic as patient was moving too much. Repeat CTH per neuro was stable, with scattered age indeterminate infarcts.   No other foci of infection or arrhythmias noted that would explain confusion. Patient's worsening mentation likely due to vascular dementia, based on CT Head.     Medically cleared for discharge 3/31.       ASSESSMENT & PLAN:    # Altered Mental Status secondary to Metabolic encephalopathy vs  advancing dementia vs. Post-ictal State   - CT H showed chronic microvascular changes/chronic infarcts; no acute pathology --> progressive vacular dementia would explain the gradual onset of symptoms described by family at bedside   - CTA H/N largely normal   - EEG 3/28 ruled out seizure; No epileptiform activity, but showed generalized slowing   - Echo 3/30: EF 57%, Normal Sclerotic aortic valve, tachycardic and irreg reg, intact atrial septa   - EKG 3/31: NSR with premature supraventricular complexes   - Neuro Consult Appreciated: IF MR Head NC shows new stroke --> EPS Consult for possible loop recorder; will transfer to tele in the meantime   - MRI Head NC to definitively rule out stroke--> Nondiagnostic incomplete exam, pt was unable to tolerate the scan ---> f/u CTH recommended by neuro was unremarkable for acute pathology, showed chronic microvascular changes and age indeterminate infarcts   - f/u Folate; B12 256 (wnl); RPR (-)   - PT/OT consult   - c/w ASA (home med)   - c/w Atorvastatin 40mg QD per neuro (started this visit)    # Leukocytosis - Resolved   - UA (-), urethritis/cystitis/pyelo ruled out   - f/u blood cultures - WBC (13.69) 3/28/22 --> 10K 3/31; likely hemoconcentration since it resolved with IVF   - NO infectious focus apparent     # Hypernatremia - stable   - 147 on 3/31, likely from poor PO intake of fluids   - d/c IVF of 3/31, and give PO fluids   - repeat BMP at 11am 3/31   - otherwise medically cleared for d/c     # Umbilical Hernia   - KUB 3/28 --> non-obstructive pattern; stab    #HTN   - c/w Amlodipine 5mg PO daily    #Breast Cancer in remission  - c/w Anastrazole 1mg PO daily    #Hernia s/p Hernia repair (2016).  - No abdominal distension  - Monitor BM   - No signs of obstruction      DVT ppx: Lovenox  GI ppx: Not indicated  Diet: Dash/TLC   Code Status: Full   Dispo: prepare for d/c in 24 hours

## 2022-03-31 NOTE — DISCHARGE NOTE PROVIDER - NSDCCPCAREPLAN_GEN_ALL_CORE_FT
PRINCIPAL DISCHARGE DIAGNOSIS  Diagnosis: Vascular dementia  Assessment and Plan of Treatment: Ms. Nagel came to the hospital for worsening mental status and confusion. We did two CT Scans of her head, which showed chronic microvascular changes in her brain, which is likely causing her demntia and mental status. These changes occur as the blood vessels become older, and make it harder to supply blood to the brain.   Ms. Nagel also had an EEG which showed that she did not have a seizure which is causing her mental status changes. We also checked her heart on ultrasound, to make sure that it was not causing any of her symptoms. No signs of infection were found, that could explain her symptoms. She was also evaulated by the neurology team.   She should continue to take Aspirin and Atorvastatin to prevent future strokes, and follow up with her primary care doctor.       PRINCIPAL DISCHARGE DIAGNOSIS  Diagnosis: Vascular dementia  Assessment and Plan of Treatment: Ms. Nagel came to the hospital for worsening mental status and confusion. We did two CT Scans of her head, which showed chronic microvascular changes in her brain, which is likely causing her demntia and mental status. These changes occur as the blood vessels become older, and make it harder to supply blood to the brain.   Ms. Nagel also had an EEG which showed that she did not have a seizure which is causing her mental status changes. We also checked her heart on ultrasound, to make sure that it was not causing any of her symptoms. No signs of infection were found, that could explain her symptoms. She was also evaulated by the neurology team.   She should continue to take Aspirin and Atorvastatin to prevent future strokes, and follow up with her primary care doctor.  Please repeat a BMP (basic metabolic panel) blood test on 4/1 only, at the nursing home.

## 2022-03-31 NOTE — PROGRESS NOTE ADULT - CONVERSATION DETAILS
I spoke with patient's daughter Bella, who is the health care proxy noted in patient's will.       Patient's daughter Bella confirmed that patient would not want CPR, or intubation or a ventilator, should her heart or breathing stop.     DNR/DNI status was confirmed, MOLST updated and placed in patient's chart 3/31/22 11:30am.

## 2022-03-31 NOTE — DISCHARGE NOTE PROVIDER - ATTENDING DISCHARGE PHYSICAL EXAMINATION:
T(C): 37.4 (03-31-22 @ 08:02), Max: 37.4 (03-31-22 @ 08:02)  HR: 87 (03-31-22 @ 08:02) (87 - 90)  BP: 126/79 (03-31-22 @ 08:02) (108/70 - 126/79)  RR: 18 (03-31-22 @ 08:02) (18 - 18)  SpO2: --    O/E:  Awake, alert, not in distress.  HEENT: atraumatic, EOMI.  Chest: clear.  CVS: SIS2 +, no murmur.  P/A: Soft, BS+, umbilical hernia+  CNS: awake, alert, orientedx1  Ext: no edema feet.  Skin: no rash, no ulcers.

## 2022-03-31 NOTE — DISCHARGE NOTE PROVIDER - NSDCFUADDINST_GEN_ALL_CORE_FT
Please eat a diet low in salt to keep your kidneys healthy. Keep your carbohydrates/sweets consistent from day to day, in order to avoid large shifts in your blood sugar. Include a variety of fruits and vegetables in your diet.  Please repeat a BMP (basic metabolic panel) blood test on 4/1 only, at the nursing home.  Please repeat a BMP (basic metabolic panel) blood test on 4/1  to monitor Na. Pt encouraged to have oral fluids

## 2022-03-31 NOTE — PROGRESS NOTE ADULT - SUBJECTIVE AND OBJECTIVE BOX
BHAVIN CHATMAN 81y Female  MRN#: [id number]   Hospital Day: 4d    SUBJECTIVE  Patient is a 81y old Female who presents with a chief complaint of AMS (30 Mar 2022 11:42)  Currently admitted to medicine with the primary diagnosis of Change in mental status      INTERVAL HPI AND OVERNIGHT EVENTS:  Patient was examined and seen at bedside. This morning she is resting comfortably in bed and reports no issues or overnight events.  Patient is plesant,  talkative, eating breakfastt.  Mental status is mildly improved     OBJECTIVE  PAST MEDICAL & SURGICAL HISTORY  Breast CA  S/P surgery and on hormone therapy    HTN (hypertension)    H/O mastectomy, right    Status post knee replacement  B/L      ALLERGIES:  No Known Allergies    MEDICATIONS:  STANDING MEDICATIONS  amLODIPine   Tablet 5 milliGRAM(s) Oral daily  anastrozole 1 milliGRAM(s) Oral daily  aspirin  chewable 81 milliGRAM(s) Oral daily  atorvastatin 40 milliGRAM(s) Oral at bedtime  enoxaparin Injectable 40 milliGRAM(s) SubCutaneous every 24 hours  pantoprazole    Tablet 40 milliGRAM(s) Oral before breakfast    PRN MEDICATIONS  melatonin 3 milliGRAM(s) Oral at bedtime PRN      PHYSICAL EXAM:  GENERAL: NAD, well-groomed, well-developed  HEAD:  Atraumatic, Normocephalic  EYES: EOMI  NECK: Supple  NERVOUS SYSTEM:  Alert & Oriented X2 (to name and place, does not know the year or president)  CHEST/LUNG: Clear to auscultation bilaterally; No rales, rhonchi, wheezing, or rubs  HEART: Regular rate and rhythm; No murmurs, rubs, or gallops  ABDOMEN: Soft, Nondistended; Bowel sounds present, +umbilical hernia  EXTREMITIES:  2+ Peripheral Pulses, No clubbing, cyanosis, or edema  LYMPH: No lymphadenopathy noted  SKIN: No rashes or lesions      VITAL SIGNS: Last 24 Hours  T(C): 37.4 (31 Mar 2022 08:02), Max: 37.4 (31 Mar 2022 08:02)  T(F): 99.3 (31 Mar 2022 08:02), Max: 99.3 (31 Mar 2022 08:02)  HR: 87 (31 Mar 2022 08:02) (87 - 90)  BP: 126/79 (31 Mar 2022 08:02) (108/70 - 126/79)  BP(mean): --  RR: 18 (31 Mar 2022 08:02) (18 - 18)  SpO2: --    LABS:                        12.2   10.53 )-----------( 189      ( 31 Mar 2022 05:56 )             37.7     03-31    147<H>  |  108  |  19  ----------------------------<  106<H>  4.1   |  21  |  1.3    Ca    9.1      31 Mar 2022 05:56  Mg     2.0     03-31    TPro  6.5  /  Alb  3.8  /  TBili  1.0  /  DBili  x   /  AST  14  /  ALT  9   /  AlkPhos  106  03-31              Culture - Blood (collected 28 Mar 2022 16:00)  Source: .Blood Blood-Venous  Preliminary Report (30 Mar 2022 01:02):    No growth to date.          RADIOLOGY:     BHAVIN CHATMAN 81y Female  MRN#: [id number]   Hospital Day: 4d    SUBJECTIVE  Patient is a 81y old Female who presents with a chief complaint of AMS (30 Mar 2022 11:42)  Currently admitted to medicine with the primary diagnosis of Change in mental status      INTERVAL HPI AND OVERNIGHT EVENTS:  Patient was examined and seen at bedside. This morning she is resting comfortably in bed and reports no issues or overnight events.  Patient is plesant,  talkative, eating breakfastt.  Mental status is mildly improved     OBJECTIVE  PAST MEDICAL & SURGICAL HISTORY  Breast CA  S/P surgery and on hormone therapy    HTN (hypertension)    H/O mastectomy, right    Status post knee replacement  B/L      ALLERGIES:  No Known Allergies    MEDICATIONS:  STANDING MEDICATIONS  amLODIPine   Tablet 5 milliGRAM(s) Oral daily  anastrozole 1 milliGRAM(s) Oral daily  aspirin  chewable 81 milliGRAM(s) Oral daily  atorvastatin 40 milliGRAM(s) Oral at bedtime  enoxaparin Injectable 40 milliGRAM(s) SubCutaneous every 24 hours  pantoprazole    Tablet 40 milliGRAM(s) Oral before breakfast    PRN MEDICATIONS  melatonin 3 milliGRAM(s) Oral at bedtime PRN      PHYSICAL EXAM:  GENERAL: NAD, well-groomed, well-developed  HEAD:  Atraumatic, Normocephalic  EYES: EOMI  NECK: Supple  NERVOUS SYSTEM:  Alert & Oriented X2 Now knows which hospital she's in (improving)  CHEST/LUNG: Clear to auscultation bilaterally; No rales, rhonchi, wheezing, or rubs  HEART: Regular rate and rhythm; No murmurs, rubs, or gallops  ABDOMEN: Soft, Nondistended; Bowel sounds present, +umbilical hernia  EXTREMITIES:  2+ Peripheral Pulses, No clubbing, cyanosis, or edema  LYMPH: No lymphadenopathy noted  SKIN: No rashes or lesions      VITAL SIGNS: Last 24 Hours  T(C): 37.4 (31 Mar 2022 08:02), Max: 37.4 (31 Mar 2022 08:02)  T(F): 99.3 (31 Mar 2022 08:02), Max: 99.3 (31 Mar 2022 08:02)  HR: 87 (31 Mar 2022 08:02) (87 - 90)  BP: 126/79 (31 Mar 2022 08:02) (108/70 - 126/79)  BP(mean): --  RR: 18 (31 Mar 2022 08:02) (18 - 18)  SpO2: --    LABS:                        12.2   10.53 )-----------( 189      ( 31 Mar 2022 05:56 )             37.7     03-31    147<H>  |  108  |  19  ----------------------------<  106<H>  4.1   |  21  |  1.3    Ca    9.1      31 Mar 2022 05:56  Mg     2.0     03-31    TPro  6.5  /  Alb  3.8  /  TBili  1.0  /  DBili  x   /  AST  14  /  ALT  9   /  AlkPhos  106  03-31              Culture - Blood (collected 28 Mar 2022 16:00)  Source: .Blood Blood-Venous  Preliminary Report (30 Mar 2022 01:02):    No growth to date.          RADIOLOGY:

## 2022-03-31 NOTE — DISCHARGE NOTE PROVIDER - HOSPITAL COURSE
82 yo woman with a PMHx of HTN, R breast CA s/p lumpectomy 6-7 yrs ago, admitted for confusion/AMS, She was sent in by her sons after being noted to have progressive confusion for the last 3 days. Other than her confusion and lack of focus/coherence no other symptoms were reported.     in ED: Vitals were normal; CTH and CTA and CXR were unremarkable.     Patient admitted for Metabolic Encephalopathy.   Neurology closely followed patient on this visit.   EEG ruled out seizure. MRI was nondiagnostic as patient was moving too much. Repeat CTH per neuro was stable, with scattered age indeterminate infarcts.   No other foci of infection or arrhythmias noted that would explain confusion. Patient's worsening mentation likely due to vascular dementia, based on CT Head.     Medically cleared for discharge 3/31. 82 yo woman with a PMHx of HTN, R breast CA s/p lumpectomy 6-7 yrs ago, admitted for confusion/AMS, She was sent in by her sons after being noted to have progressive confusion for the last 3 days. Other than her confusion and lack of focus/coherence no other symptoms were reported.     in ED: Vitals were normal; CTH and CTA and CXR were unremarkable.     Patient admitted for Metabolic Encephalopathy.   Neurology closely followed patient on this visit.   EEG ruled out seizure. MRI was nondiagnostic as patient was moving too much. Repeat CTH per neuro was stable, with scattered age indeterminate infarcts.   No other foci of infection or arrhythmias noted that would explain confusion. Patient's worsening mentation likely due to vascular dementia, based on CT Head.     Medically cleared for discharge 3/31.     # Progressive vascular  dementia   - CT Head No Cont (03.29.22 @ 16:54) >No significant change since recent CT of 3/27/2022.Multiple scattered infarcts someof which are age indeterminate without  evidence of hemorrhage.Moderate chronic microvascular type changes.  - EEG (03.28.22 @ 12:00) >Consistent with diffuse cerebral electrophysiological dysfunction.  Secondary to non- specific cause.  - MR Head No Cont (03.28.22 @ 22:23) >The patient was unable to tolerate the scan. Only the DWI sequence was performed, which is nondiagnostic due to extensive motion artifact.  -TTE Echo Complete w/o Contrast w/ Doppler (03.30.22 @ 14:49) >EF of 57 %.  - LDL Cholesterol Calculated: 132 mg/dL (03.28.22 @ 04:30)  - c/w ASA, Statin    # Hypokalemia- resolved    # Leukocytosis - resolved   - blood Culture Results: No growth to date. (03.28.22 @ 16:00)  - UA- neg  -  Xray Chest 1 View- PORTABLE-Urgent (03.27.22 @ 13:33) >No radiographic evidence of acute cardiopulmonary disease.    # Hypertension  - c/w norvasc    # H/o breast Cancer in remission  - c/w anastrazole     # Discussed GOC with daughter: Pt is DNR/DNI

## 2022-04-02 LAB
CULTURE RESULTS: SIGNIFICANT CHANGE UP
SPECIMEN SOURCE: SIGNIFICANT CHANGE UP

## 2022-04-03 LAB
CULTURE RESULTS: SIGNIFICANT CHANGE UP
SPECIMEN SOURCE: SIGNIFICANT CHANGE UP

## 2022-04-04 DIAGNOSIS — E87.6 HYPOKALEMIA: ICD-10-CM

## 2022-04-04 DIAGNOSIS — Z85.3 PERSONAL HISTORY OF MALIGNANT NEOPLASM OF BREAST: ICD-10-CM

## 2022-04-04 DIAGNOSIS — Z79.899 OTHER LONG TERM (CURRENT) DRUG THERAPY: ICD-10-CM

## 2022-04-04 DIAGNOSIS — D72.829 ELEVATED WHITE BLOOD CELL COUNT, UNSPECIFIED: ICD-10-CM

## 2022-04-04 DIAGNOSIS — G93.41 METABOLIC ENCEPHALOPATHY: ICD-10-CM

## 2022-04-04 DIAGNOSIS — R41.82 ALTERED MENTAL STATUS, UNSPECIFIED: ICD-10-CM

## 2022-04-04 DIAGNOSIS — E87.0 HYPEROSMOLALITY AND HYPERNATREMIA: ICD-10-CM

## 2022-04-04 DIAGNOSIS — Z66 DO NOT RESUSCITATE: ICD-10-CM

## 2022-04-04 DIAGNOSIS — Z96.653 PRESENCE OF ARTIFICIAL KNEE JOINT, BILATERAL: ICD-10-CM

## 2022-04-04 DIAGNOSIS — K42.9 UMBILICAL HERNIA WITHOUT OBSTRUCTION OR GANGRENE: ICD-10-CM

## 2022-04-04 DIAGNOSIS — Z79.82 LONG TERM (CURRENT) USE OF ASPIRIN: ICD-10-CM

## 2022-04-04 DIAGNOSIS — I10 ESSENTIAL (PRIMARY) HYPERTENSION: ICD-10-CM

## 2022-04-04 DIAGNOSIS — F01.50 VASCULAR DEMENTIA WITHOUT BEHAVIORAL DISTURBANCE: ICD-10-CM

## 2022-04-04 DIAGNOSIS — Z90.711 ACQUIRED ABSENCE OF UTERUS WITH REMAINING CERVICAL STUMP: ICD-10-CM

## 2022-04-04 DIAGNOSIS — Z90.11 ACQUIRED ABSENCE OF RIGHT BREAST AND NIPPLE: ICD-10-CM

## 2023-05-09 NOTE — PHYSICAL THERAPY INITIAL EVALUATION ADULT - PHYSICAL ASSIST/NONPHYSICAL ASSIST: GAIT, REHAB EVAL
Patient called for refill on    Revlimid         Recurring Treatments    Name Type Plan Dates Plan Provider    Active   ADULT TIXAGEVIMAB & CILGAVIMAB (EVUSHELD) COVID Treatment  5/3/2022 - Present Carlie Beck NP    Lenalidomide (Revlimid) 25 mg daily D1-21 every 28 days: Mantle Cell, CNS lymphoma ORAL CHEMOTHERAPY  1/13/2020 - Present Luke Figueroa MD              Active Treatment Days for Thume, Nancy (until 8/7/2023)    5/8/2023    ORAL CHEMOTHERAPY: Lenalidomide (Revlimid) 25 mg daily D1-21 every 28 days: Mantle Cell, CNS lymphoma    Day 1, Cycle 46 (Planned)   Oral Chemotherapy Orders:  lenalidomide (REVLIMID) 10 MG capsule                   [id number] - [place] - [address] AT [facility]  [phone]        Pt ph #[phone]            
2 person assist

## 2023-07-09 ENCOUNTER — INPATIENT (INPATIENT)
Facility: HOSPITAL | Age: 83
LOS: 3 days | Discharge: NURSING, REHAB OR LONG-TERM CARE | DRG: 391 | End: 2023-07-13
Payer: MEDICARE

## 2023-07-09 VITALS
SYSTOLIC BLOOD PRESSURE: 107 MMHG | OXYGEN SATURATION: 97 % | TEMPERATURE: 98 F | RESPIRATION RATE: 20 BRPM | DIASTOLIC BLOOD PRESSURE: 75 MMHG | HEART RATE: 102 BPM

## 2023-07-09 DIAGNOSIS — K57.80 DIVERTICULITIS OF INTESTINE, PART UNSPECIFIED, WITH PERFORATION AND ABSCESS WITHOUT BLEEDING: ICD-10-CM

## 2023-07-09 DIAGNOSIS — Z90.11 ACQUIRED ABSENCE OF RIGHT BREAST AND NIPPLE: Chronic | ICD-10-CM

## 2023-07-09 DIAGNOSIS — Z96.659 PRESENCE OF UNSPECIFIED ARTIFICIAL KNEE JOINT: Chronic | ICD-10-CM

## 2023-07-09 LAB
TROPONIN T, HIGH SENSITIVITY RESULT: 36 NG/L — SIGNIFICANT CHANGE UP (ref 0–51)

## 2023-07-09 PROCEDURE — 74177 CT ABD & PELVIS W/CONTRAST: CPT | Mod: 26,MB

## 2023-07-09 PROCEDURE — 70450 CT HEAD/BRAIN W/O DYE: CPT | Mod: 26,MD

## 2023-07-09 PROCEDURE — 99285 EMERGENCY DEPT VISIT HI MDM: CPT | Mod: GC

## 2023-07-09 PROCEDURE — 71045 X-RAY EXAM CHEST 1 VIEW: CPT | Mod: 26

## 2023-07-09 RX ORDER — ENOXAPARIN SODIUM 100 MG/ML
40 INJECTION SUBCUTANEOUS EVERY 24 HOURS
Refills: 0 | Status: DISCONTINUED | OUTPATIENT
Start: 2023-07-09 | End: 2023-07-13

## 2023-07-09 RX ORDER — ASPIRIN/CALCIUM CARB/MAGNESIUM 324 MG
81 TABLET ORAL DAILY
Refills: 0 | Status: DISCONTINUED | OUTPATIENT
Start: 2023-07-09 | End: 2023-07-13

## 2023-07-09 RX ORDER — PIPERACILLIN AND TAZOBACTAM 4; .5 G/20ML; G/20ML
3.38 INJECTION, POWDER, LYOPHILIZED, FOR SOLUTION INTRAVENOUS ONCE
Refills: 0 | Status: COMPLETED | OUTPATIENT
Start: 2023-07-09 | End: 2023-07-09

## 2023-07-09 RX ORDER — PANTOPRAZOLE SODIUM 20 MG/1
40 TABLET, DELAYED RELEASE ORAL ONCE
Refills: 0 | Status: COMPLETED | OUTPATIENT
Start: 2023-07-09 | End: 2023-07-09

## 2023-07-09 RX ORDER — AMLODIPINE BESYLATE 2.5 MG/1
5 TABLET ORAL EVERY 24 HOURS
Refills: 0 | Status: DISCONTINUED | OUTPATIENT
Start: 2023-07-09 | End: 2023-07-13

## 2023-07-09 RX ORDER — DONEPEZIL HYDROCHLORIDE 10 MG/1
5 TABLET, FILM COATED ORAL AT BEDTIME
Refills: 0 | Status: DISCONTINUED | OUTPATIENT
Start: 2023-07-09 | End: 2023-07-13

## 2023-07-09 RX ORDER — SODIUM CHLORIDE 9 MG/ML
500 INJECTION INTRAMUSCULAR; INTRAVENOUS; SUBCUTANEOUS ONCE
Refills: 0 | Status: COMPLETED | OUTPATIENT
Start: 2023-07-09 | End: 2023-07-09

## 2023-07-09 RX ORDER — PIPERACILLIN AND TAZOBACTAM 4; .5 G/20ML; G/20ML
3.38 INJECTION, POWDER, LYOPHILIZED, FOR SOLUTION INTRAVENOUS ONCE
Refills: 0 | Status: COMPLETED | OUTPATIENT
Start: 2023-07-10 | End: 2023-07-09

## 2023-07-09 RX ORDER — ATORVASTATIN CALCIUM 80 MG/1
40 TABLET, FILM COATED ORAL AT BEDTIME
Refills: 0 | Status: DISCONTINUED | OUTPATIENT
Start: 2023-07-09 | End: 2023-07-13

## 2023-07-09 RX ORDER — PIPERACILLIN AND TAZOBACTAM 4; .5 G/20ML; G/20ML
3.38 INJECTION, POWDER, LYOPHILIZED, FOR SOLUTION INTRAVENOUS EVERY 8 HOURS
Refills: 0 | Status: DISCONTINUED | OUTPATIENT
Start: 2023-07-10 | End: 2023-07-10

## 2023-07-09 RX ADMIN — ATORVASTATIN CALCIUM 40 MILLIGRAM(S): 80 TABLET, FILM COATED ORAL at 22:48

## 2023-07-09 RX ADMIN — PIPERACILLIN AND TAZOBACTAM 25 GRAM(S): 4; .5 INJECTION, POWDER, LYOPHILIZED, FOR SOLUTION INTRAVENOUS at 23:42

## 2023-07-09 RX ADMIN — Medication 81 MILLIGRAM(S): at 22:48

## 2023-07-09 RX ADMIN — PIPERACILLIN AND TAZOBACTAM 200 GRAM(S): 4; .5 INJECTION, POWDER, LYOPHILIZED, FOR SOLUTION INTRAVENOUS at 15:10

## 2023-07-09 RX ADMIN — SODIUM CHLORIDE 500 MILLILITER(S): 9 INJECTION INTRAMUSCULAR; INTRAVENOUS; SUBCUTANEOUS at 17:53

## 2023-07-09 RX ADMIN — AMLODIPINE BESYLATE 5 MILLIGRAM(S): 2.5 TABLET ORAL at 22:48

## 2023-07-09 RX ADMIN — ENOXAPARIN SODIUM 40 MILLIGRAM(S): 100 INJECTION SUBCUTANEOUS at 22:44

## 2023-07-09 RX ADMIN — PANTOPRAZOLE SODIUM 40 MILLIGRAM(S): 20 TABLET, DELAYED RELEASE ORAL at 12:56

## 2023-07-09 RX ADMIN — DONEPEZIL HYDROCHLORIDE 5 MILLIGRAM(S): 10 TABLET, FILM COATED ORAL at 22:43

## 2023-07-09 NOTE — H&P ADULT - NSHPPHYSICALEXAM_GEN_ALL_CORE
General: In NAD, laying in stretcher  Resp: Bilateral chest rise, no use of accessory muscles on RA  Abdomen: Soft, appropriately distended, with known reducible ventral hernia. Non tender to palpation.   Extremities: Moving all 4 extremities.

## 2023-07-09 NOTE — H&P ADULT - NSHPLABSRESULTS_GEN_ALL_CORE
< end of copied text >    < from: CT Abdomen and Pelvis w/ IV Cont (07.09.23 @ 14:09) >      FINDINGS:    LOWER CHEST: No visualized pleural effusion. Minimal dependent lung   scarring. Aortic valve and multivessel coronary artery calcifications.   Ectasia of the partially imaged mid ascending thoracic aorta measuring   3.8 cm.    LIVER: Liver size is borderline enlarged, 18 cm in craniocaudal   dimension. Subcentimeter hepatic hypodensities are too small to   characterize. Remaining portal vein and hepatic veins are patent.  BILE DUCTS: No distention  GALLBLADDER: Unremarkable CT appearance  SPLEEN: Spleen size within normal limits  PANCREAS: No acute peripancreatic inflammation  ADRENALS: Unremarkable  KIDNEYS/URETERS: Moderate left hydroureteronephrosis and ureteral   enhancement extending to the left hemipelvis adjacent to inflamed sigmoid   colon, further described below. Mild fullness of the right renal   collecting system without overt hydronephrosis. Mild right ureteral   enhancement as well. Bilateral renal collecting systems are partially   duplicated to the proximal ureters.    BLADDER: Incompletely assessed due to streak artifact from bilateral hip   prostheses. Pelvic inflammatory changes extend to the urinary bladder   dome. Urinary bladder does not contain air to suggest colovesicular   fistula, however is suboptimally assessed due to streak artifact.  REPRODUCTIVE ORGANS: Probable prior hysterectomy. Suboptimal evaluation   due to streak artifact. Soft tissue density in the mid pelvis extends up   to the inflamed colon, further described below.    BOWEL and PERITONEUM: Limited noncontrast evaluation. Small hiatal   hernia. Stomach is underdistended. Small duodenal diverticulum. No small   bowel distention. Large ventral hernia contains nonobstructed small and   large bowel. Mild stool burden of the colon limits evaluation of the   colonic mucosa. Extensive colonic diverticulosis with marked inflammatory   changes of acute on chronic sigmoid diverticulitis. Perisigmoid   collection containing air and small amount of fluid measures 3.7 x 1.2   cm, image 91 series 2, and is closely associated with the inflamed left   ureter. Additional dependent pelvic mahsa-rectosigmoid air/fluid measures   2.7 x 1.2 cm, near the vaginal cuff. Inflammatory changes extend to the   urinary bladder dome. Urinary bladder does not contain air, however   suboptimally assessed due to streak artifact.  PERITONEUM: See above 'bowel and peritoneum' section.  VESSELS: No abdominal aortic aneurysm. Aortoiliac an visceral artery   atheromatous changes.  RETROPERITONEUM/LYMPH NODES: Small volume nodes.  ABDOMINAL WALL: Large ventral midline hernia containing nonobstructed   small and large bowel.  BONES: Bilateral hip prostheses partially imaged, with surrounding streak   artifact limiting evaluation of the intrapelvic structures. Diffuse   osseous demineralization and multilevel degenerative changes of the   bones. Slightly scoliotic spinal curvature. Multilevel vertebral body   ankylosis, for example at T11-T12, L2-L3, and L3-L4 levels. Grade 1   anterolisthesis of L4 and L5. Central canal and neural foramina are not   adequately assessed on this study.    IMPRESSION:    Large ventral midline hernia containing nonobstructed small and large   bowel. Small hiatal hernia. No bowel obstruction.    Acute on chronic sigmoid diverticulitis with perisigmoid and rectosigmoid   air/fluid collections measuring 3.7 x 1.2 cm and 2.7 x 1.2 cm,   respectively. Adjacent inflammatory changes of the distal left ureter   associated with moderate left hydroureteronephrosis. Bilateral ureteral   enhancement may reflect urinary tract infection.    Pelvic inflammatory changes extend to the urinary bladder dome. Urinary   bladder does not contain air to suggest colovesicular fistula, however is   suboptimally assessed due to streak artifact.    --- End of Report     < end of copied text >

## 2023-07-09 NOTE — ED PROVIDER NOTE - NSICDXPASTMEDICALHX_GEN_ALL_CORE_FT
PAST MEDICAL HISTORY:  Breast cancer     History of TIAs     HLD (hyperlipidemia)     Vascular dementia

## 2023-07-09 NOTE — ED PROVIDER NOTE - CLINICAL SUMMARY MEDICAL DECISION MAKING FREE TEXT BOX
ap 83 F w/ hx of TIA, on ASA, breast ca, vascular dementia, umbilical hernia, presents to the ER w/ elevated WBC and hematemesis, hx provided by EMS, pt unable to provide hx, she is confused oriented to self, she has no abd tenderness, she is intermittent command following, pt is awake, nontoxic appearing, has soft abd w/ hernia in the umbilical site, pt w/ no lower leg edema. Pt w/ possible bowel obstruction vs incarcerated strangulated hernia, pt w/ possible anemia, w/ GI bleed will tx w/ protonix and ivf plan for admission for further monitoring, pt w/ no emesis while in the ER> ap 83 F w/ hx of TIA, on ASA, breast ca, vascular dementia, umbilical hernia, presents to the ER w/ elevated WBC and hematemesis, hx provided by EMS, pt unable to provide hx, she is confused oriented to self, she has no abd tenderness, she is intermittent command following, pt is awake, nontoxic appearing, has soft abd w/ hernia in the umbilical site, pt w/ no lower leg edema. Pt w/ possible bowel obstruction vs incarcerated strangulated hernia, pt w/ possible anemia, w/ GI bleed will tx w/ protonix and ivf plan for admission for further monitoring, pt w/ no emesis while in the ER. Patient is slighlty anemic with Hb of 7.9 and on UA she has leuk esterase but nitrite negative. ap 83 F w/ hx of TIA, on ASA, breast ca, vascular dementia, umbilical hernia, presents to the ER w/ elevated WBC and hematemesis, hx provided by EMS, pt unable to provide hx, she is confused oriented to self, she has no abd tenderness, she is intermittent command following, pt is awake, nontoxic appearing, has soft abd w/ hernia in the umbilical site, pt w/ no lower leg edema. Pt w/ possible bowel obstruction vs incarcerated strangulated hernia, pt w/ possible anemia, w/ GI bleed will tx w/ protonix and ivf plan for admission for further monitoring, pt w/ no emesis while in the ER. Patient is slighlty anemic with Hb of 7.9, will not transfuse at this time. CT ab/pelvis indicated diverticulitis with perforation and non-strangulated hernia. Started on zosyn in ED.

## 2023-07-09 NOTE — ED ADULT NURSE NOTE - NSFALLRISKINTERV_ED_ALL_ED
Assistance OOB with selected safe patient handling equipment if applicable/Assistance with ambulation/Communicate fall risk and risk factors to all staff, patient, and family/Monitor gait and stability/Provide patient with walking aids/Provide visual cue: yellow wristband, yellow gown, etc/Reinforce activity limits and safety measures with patient and family/Call bell, personal items and telephone in reach/Instruct patient to call for assistance before getting out of bed/chair/stretcher/Non-slip footwear applied when patient is off stretcher/[place] to call system/Physically safe environment - no spills, clutter or unnecessary equipment/Purposeful Proactive Rounding/Room/bathroom lighting operational, light cord in reach Assistance OOB with selected safe patient handling equipment if applicable/Assistance with ambulation/Communicate fall risk and risk factors to all staff, patient, and family/Monitor gait and stability/Provide patient with walking aids/Provide visual cue: yellow wristband, yellow gown, etc/Reinforce activity limits and safety measures with patient and family/Call bell, personal items and telephone in reach/Instruct patient to call for assistance before getting out of bed/chair/stretcher/Non-slip footwear applied when patient is off stretcher/[place] to call system/Physically safe environment - no spills, clutter or unnecessary equipment/Purposeful Proactive Rounding/Room/bathroom lighting operational, light cord in reach Assistance OOB with selected safe patient handling equipment if applicable/Assistance with ambulation/Communicate fall risk and risk factors to all staff, patient, and family/Monitor gait and stability/Provide patient with walking aids/Provide visual cue: yellow wristband, yellow gown, etc/Reinforce activity limits and safety measures with patient and family/Call bell, personal items and telephone in reach/Instruct patient to call for assistance before getting out of bed/chair/stretcher/Non-slip footwear applied when patient is off stretcher/[place] to call system/Physically safe environment - no spills, clutter or unnecessary equipment/Purposeful Proactive Rounding/Room/bathroom lighting operational, light cord in reach

## 2023-07-09 NOTE — ED PROVIDER NOTE - CARE PLAN
1 Principal Discharge DX:	Diverticulitis of intestine with perforation  Secondary Diagnosis:	Umbilical hernia

## 2023-07-09 NOTE — H&P ADULT - ASSESSMENT
A: 83F with hx of TIA on ASA, HTN, HLD, vascular dementia and ventral hernia who presents to hospital after being found down at assisted living facility with abdominal pain and possible hematemesis. Subjective exam and HPI limited as patient with dementia. Son reports patient has had some abdominal pain for 2-3 days now. CT scan in the ED concerning for acute on chronic diverticulitis with two small mahsa-sigmoid collections concerning for perforation. Collections 3.7x1.2cm and 2.7x1.2cm. Unlikely to benefit from IR drainage at this time so will defer IR consult for now.     Plan:   -Admit under Dr. Tavarez  -NPO with s/c  -Abx: Zosyn  -FOB test  -Possible EGD with GI tomorrow pending further eval in the AM with team    Patient and plan discussed with Dr. Daysi Llanos Team [id number] A: 83F with hx of TIA on ASA, HTN, HLD, vascular dementia and ventral hernia who presents to hospital after being found down at assisted living facility with abdominal pain and possible hematemesis. Subjective exam and HPI limited as patient with dementia. Son reports patient has had some abdominal pain for 2-3 days now. CT scan in the ED concerning for acute on chronic diverticulitis with two small mahsa-sigmoid collections concerning for perforation. Collections 3.7x1.2cm and 2.7x1.2cm. Unlikely to benefit from IR drainage at this time so will defer IR consult for now.     Plan:   -Admit under Dr. Tavarez  -NPO with s/c  -Abx: Zosyn  -FOB test  -Possible EGD with GI tomorrow pending further eval in the AM with team    Patient and plan discussed with Dr. Daysi Llanos Team [id number] A: 83F with hx of TIA on ASA, HTN, HLD, vascular dementia and ventral hernia who presents to hospital after being found down at assisted living facility with abdominal pain and possible hematemesis. Subjective exam and HPI limited as patient with dementia. Son reports patient has had some abdominal pain for 2-3 days now. CT scan in the ED concerning for acute on chronic diverticulitis with two small mahsa-sigmoid collections concerning for perforation. Collections 3.7x1.2cm and 2.7x1.2cm. Unlikely to benefit from IR drainage at this time so will defer IR consult for now.     Plan:   -Admit under Dr. Tavarez  -NPO with s/c  -Abx: Zosyn  -FOB test  -Possible EGD with GI tomorrow pending further eval in the AM with team    Patient and plan discussed with Dr. Daysi Llanos Team [id number]

## 2023-07-09 NOTE — H&P ADULT - ATTENDING COMMENTS
My note from 7/10/23 in progress note from 7/10/23 is below.  This was mistakenly left out of the H and P by me on 7/10/23    Patient seen by me at 7:20 AM [7/10/23].  Denies abdominal pain.  Abdomen soft and nontender.  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  She is without pain or tenderness, and has a normal white blood count.  There is no role for IR consult since these abscesses are small and difficult to reach.  However, because of the chronic nature and because of the need to try to avoid surgery in this elderly woman with dementia will plan on a prolonged course of intravenous antibiotics which can be continued as an outpatient.  GI consult regarding upper endoscopy noted.  We will continue to monitor for evidence of upper GI bleed which would then necessitate an upper endoscopy.

## 2023-07-09 NOTE — ED ADULT NURSE NOTE - OBJECTIVE STATEMENT
Patient  is alert  and oriented x2. Color is good and skin warm to touch.  She is  sent here for  coffee ground vomiting. No c/o  nausea offered at this time. She  is  c/o  generalized  body pain. No black  stools noted.

## 2023-07-09 NOTE — ED PROVIDER NOTE - NS ED ROS FT
CONSTITUTIONAL: No fevers, no chills, no lightheadedness, no dizziness  EYES: no visual changes, no eye pain  EARS: no ear drainage, no ear pain, no change in hearing  NOSE: no nasal congestion  MOUTH/THROAT: no sore throat  CV: No chest pain, no palpitations  RESP: No SOB, no cough  GI: No n/v/d, no abd pain  : no dysuria, no hematuria, no flank pain  MSK: no back pain, no extremity pain  SKIN: no rashes  NEURO: no headache, no focal weakness, no decreased sensation/parasthesias   PSYCHIATRIC: no known mental health issues

## 2023-07-09 NOTE — ED PROVIDER NOTE - PROGRESS NOTE DETAILS
spoke w/ daughter who is recommending pt if medically necessary for symptoms, would consent to blood transfusion, pt w/ possible hernia, incarceration/strangulation, pt to have ct scan of the abdomen; pt w/ severe demtnia, is a DNR spoke with nursing home nurse states patient has had a change in mental status for about 1 week. At baseline she is AOx2, however, she has been hallucinating and AOx1 for the past week. At the nursing home they found elevated WBC on CBC, however, were unable to get a urine from the patient. They empirically started the patient on Cipro 3 days ago. This morning on rounds the nurse found her with covered in coffee ground hematemesis. spoke with surgery about divertiulitis w/ possible perforation, stated they will come to evaluate the patient in the ED Spoke with surgery team, will admit the patient to their service for perforated diverticulitis. Antibiotics given in the ED, NPO order placed. -Joyce Magallanes MD (Attending)

## 2023-07-09 NOTE — ED PROVIDER NOTE - WET READ LAUNCH FT
"History & Physical Exam       HISTORY OF PRESENT ILLNESS:     Chief Complaint:headache for past 4 days, fever, dizziness with looking up and down, and fatigue for 2 days     History of Present Illness: Yasmine  is a 8  y.o. 8  m.o.  Female  who was admitted on 10/3/2017 for headache, fever, diarrhea, dizziness, and fatigue.  The headache started on Sunday and has been localized to the top of her head.  Reported photophobia with headache, which is improved this AM.  She has had a fever of 99F on Tuesday night and Wednesday AM.  She had 5-6 episodes of non-bloody diarrhea on Sunday after eating.  No reported diarrhea since Sunday.  Patient reported dizziness, feeling like she was going to \"pass out\" with looking up and down.  She has had decreased oral intake since symptom onset.  She denies any recent illnesses or known exposure to sick contacts.  Patient and her brother have recently started attending the [employer]. Patient was given tylenol and motrin for relief at home with only temporary improvement.     She denies any current headache, [place], vomiting, diarrhea, or constipation.  She current denies any neck pain or pain with movement of the lower extremities.  Patient has occasional headaches associated with wearing her glasses localized to the temporal region.                 ER course:   Febrile (102.9F) and HR ranging from  in the ER.     Rapid strep and influenza testing negative.   UA showed > 150 ketones, 30 protein, 0-2 RBCs, with few epithelial cells and mucous threads   LP done with leukocytosis of 15 with 74% lymphocytes. Glucose, protein WNL.   Patient admitted and transferred to pediatric floor around 4 AM.         PAST MEDICAL HISTORY:     Primary Care Physician:  No established PCP     Past Medical History:  \"twisted intestine\" at 2 months old.     Past Surgical History:  RUQ operation for twisted intestine at 2 months old.     Birth/Developmental History: Patient was born FT, no " "maternal complication, , no post delivery complications, did well and discharged home from nursery.     Allergies:  NKDA     Home Medications:  Tylenol and Motrin PRN for pain, HA     Social History:  Patient lives with mother, father, brother in [place].  She recently started attending the [employer].  She does not play sports but likes to play outside with her dog, Slime.   no recent travel. No recent mosquito bites. Went campingin  but nothing recent. No swimming in lakes.     Family History:       Immunizations:  UTD     Review of Systems: I have reviewed at least 10 organs systems and found them to be negative except as described above.     OBJECTIVE:     Vitals:   Blood pressure 94/54, pulse 83, temperature 36.8 °C (98.2 °F), resp. rate 24, height 1.397 m (4' 7\"), weight 31 kg (68 lb 5.5 oz), SpO2 99 %.     Physical Exam:   Gen:  Tired-appearing girl who appears quiet but is able to answer questions and cooperates with examination.     HEENT: MMM, EOMI.  PEERL.   Cardio: RRR, clear s1/s2, no murmur. 2+ pulses.   Resp:  Equal bilat, clear to auscultation.   GI/: Mildly hard with palpation, non-distended, non tender, decreased bowel sounds, no guarding/rebound.  Well-healed incision in the RUQ (previous surgery).   Neuro: Non-focal, Gross intact, no deficits.     Skin/Extremities: Cap refill <3sec, warm/well perfused, no rash, normal extremities       Labs:   (-) Rapid strep, influenza   UA: ketones > 150, protein 30, RBC 0-2   CSF: total WBC of 16, gram stain negative, glucose 58, protein 23.   Lactic acid 1.2     Recent Labs     10/04/17    0218   WBC 9.1   RBC 5.33*   HEMOGLOBIN 14.8*   HEMATOCRIT 44.0*   MCV 82.6   MCH 27.8   RDW 37.5   PLATELETCT 299   MPV 10.2*   NEUTSPOLYS 38.60   LYMPHOCYTES 49.70*   MONOCYTES 10.60*   EOSINOPHILS 0.70   BASOPHILS 0.30     Recent Labs     10/04/17    021   SODIUM 138   POTASSIUM 5.0   CHLORIDE 107   CO2 19*   GLUCOSE 97   BUN 9     Recent Labs     " 10/04/17    0218   CREATININE 0.48     [place] virus antibody and Enterovirus CSF PCR pending   Imaging: none     ASSESSMENT/PLAN:   8 y.o. female with headache, photophobia, dizziness, fever, hx of diarrhea for the past 3-4 days suspicious for viral meningitis.     # Viral meningitis   Etiology: CSF with leukocytosis and negative for gram stain with hx of fever, HA, photophobia, dizziness, and GI upset likely associated with viral meningitis   Update: Patient started on Ceftriaxone and Vancomycin for less likely bacterial meningitis coverage.     Urine ketones and BUN:Cr elevated and likely associated with decreased oral intake.   Plan:   -Supportive therapy for fever and HA with Tylenol and Motrin.  Zofran PRN for nausea   -Will continue AB pending cultures.  Will discontinue if cultures are negative at 36-48 hours.   -IVF to maintain hydration with D5 NS with 20 mEq KCL   -encourage increasing oral intake as tolerated.     Neurochecks q 4 hours   There are no Wet Read(s) to document. There is 1 Wet Read(s) to document.

## 2023-07-09 NOTE — ED CLERICAL - DIVISION
[facility]... [facility]... [facility]...

## 2023-07-09 NOTE — ED PROVIDER NOTE - PHYSICAL EXAMINATION
Physical Exam:  Gen: NAD, AOx1, non-toxic appearing  Head: NCAT  HEENT: EOMI, PEERLA, normal conjunctiva, tongue midline, oral mucosa moist  Lung: CTAB, no respiratory distress, no wheezes/rhonchi/rales B/L, speaking in full sentences  CV: RRR, no murmurs, rubs or gallops  Abd: soft, NT, ND, no guarding, no rigidity, umbilical hernia is reducible, pain with reduction  MSK: no visible deformities, ROM normal in UE/LE, no back pain  Neuro: No focal sensory or motor deficits  Skin: Warm, well perfused, no rash, no leg swelling  Psych: normal affect, calm Physical Exam:  Gen: NAD, AOx1, non-toxic appearing  Head: NCAT  HEENT: EOMI, PEERLA, normal conjunctiva, tongue midline, oral mucosa moist, no active bleeding from the oropharynx or nasopharynx   Lung: CTAB, no respiratory distress, no wheezes/rhonchi/rales B/L, speaking in full sentences  CV: RRR, no murmurs, rubs or gallops  Abd: soft, NT, ND, no guarding, no rigidity, umbilical hernia is reducible, pain with reduction  MSK: no visible deformities, ROM normal in UE/LE, no back pain  Neuro: No focal sensory or motor deficits  Skin: Warm, well perfused, no rash, no leg swelling  Psych: normal affect, calm Physical Exam:  Gen: NAD, AOx1, non-toxic appearing  Head: NCAT  HEENT: EOMI, PEERLA, normal conjunctiva, tongue midline, oral mucosa moist, no active bleeding from the oropharynx or nasopharynx   Lung: CTAB, no respiratory distress, no wheezes/rhonchi/rales B/L, speaking in full sentences  CV: RRR, no murmurs, rubs or gallops  Abd: soft, NT, mildly distended, no guarding, no rigidity, umbilical hernia is reducible, pain with reduction  MSK: no visible deformities, ROM normal in UE/LE, no back pain  Neuro: No focal sensory or motor deficits  Skin: Warm, well perfused, no rash, no leg swelling  Psych: normal affect, calm

## 2023-07-09 NOTE — ED PROVIDER NOTE - OBJECTIVE STATEMENT
82 y/o F with PmHx TIA on asprin, HTN, HLD, Vascular Dementia and Umbilical Hernia was brought in by EMS from [place] [facility] due to hematemesis. Per EMS patient has had an elevated WBC the last few days and she has been taking cipro. On morning rounds she was found to be covered in coffee ground hematemesis. The patient is confused on arrival, and does not have any specific complaints. 84 y/o F with PmHx TIA on asprin, HTN, HLD, Vascular Dementia and Umbilical Hernia was brought in by EMS from [place] [facility] due to hematemesis. Per EMS patient has had an elevated WBC the last few days and she has been taking cipro. On morning rounds she was found to be covered in coffee ground hematemesis. The patient is confused on arrival, and does not have any specific complaints. 84 y/o F with PmHx TIA on asprin, HTN, HLD, Vascular Dementia and Umbilical Hernia was brought in by EMS from [place] [facility] due to hematemesis. Per EMS patient has had an elevated WBC the last few days and she has been taking cipro. On morning rounds she was found to be covered in coffee ground hematemesis. The patient is confused on arrival, and does not have any specific complaints. 84 y/o F with PmHx TIA on asprin, HTN, HLD, Vascular Dementia and Umbilical Hernia was brought in by EMS from [place] [facility] due to hematemesis. After speaking to EMS patient has had an elevated WBC the last few days and she has been taking cipro. On morning rounds she was found to be covered in coffee ground hematemesis. The patient is confused on arrival, and does not have any specific complaints. 82 y/o F with PmHx TIA on asprin, HTN, HLD, Vascular Dementia and Umbilical Hernia was brought in by EMS from [place] [facility] due to hematemesis. After speaking to EMS patient has had an elevated WBC the last few days and she has been taking cipro. On morning rounds she was found to be covered in coffee ground hematemesis. The patient is confused on arrival, and does not have any specific complaints. 84 y/o F with PmHx TIA on asprin, HTN, HLD, Vascular Dementia and Umbilical Hernia was brought in by EMS from [place] [facility] due to hematemesis. After speaking to EMS patient has had an elevated WBC the last few days and she has been taking cipro. On morning rounds she was found to be covered in coffee ground hematemesis. The patient is confused on arrival, and does not have any specific complaints.

## 2023-07-09 NOTE — H&P ADULT - HISTORY OF PRESENT ILLNESS
83F with hx of TIA on ASA, HTN, HLD, vascular dementia and ventral hernia who presents to hospital after being found down at assisted living facility with abdominal pain and possible hematemesis. Subjective exam and HPI limited as patient with dementia. Son reports patient has had some abdominal pain for 2-3 days now. CT scan in the ED concerning for acute on chronic diverticulitis with two small mahsa-sigmoid collections concerning for perforation. CT also commented on known ventral hernia.

## 2023-07-10 LAB
ANION GAP SERPL CALC-SCNC: 12 MMOL/L — SIGNIFICANT CHANGE UP (ref 5–17)
BUN SERPL-MCNC: 19 MG/DL — SIGNIFICANT CHANGE UP (ref 7–23)
CALCIUM SERPL-MCNC: 9 MG/DL — SIGNIFICANT CHANGE UP (ref 8.4–10.5)
CHLORIDE SERPL-SCNC: 108 MMOL/L — SIGNIFICANT CHANGE UP (ref 96–108)
CO2 SERPL-SCNC: 23 MMOL/L — SIGNIFICANT CHANGE UP (ref 22–31)
CREAT SERPL-MCNC: 1.15 MG/DL — SIGNIFICANT CHANGE UP (ref 0.5–1.3)
CULTURE RESULTS: SIGNIFICANT CHANGE UP
EGFR: 47 ML/MIN/1.73M2 — LOW
GLUCOSE SERPL-MCNC: 98 MG/DL — SIGNIFICANT CHANGE UP (ref 70–99)
HCT VFR BLD CALC: 25.1 % — LOW (ref 34.5–45)
HGB BLD-MCNC: 7.5 G/DL — LOW (ref 11.5–15.5)
MAGNESIUM SERPL-MCNC: 2.1 MG/DL — SIGNIFICANT CHANGE UP (ref 1.6–2.6)
MCHC RBC-ENTMCNC: 28.3 PG — SIGNIFICANT CHANGE UP (ref 27–34)
MCHC RBC-ENTMCNC: 29.9 GM/DL — LOW (ref 32–36)
MCV RBC AUTO: 94.7 FL — SIGNIFICANT CHANGE UP (ref 80–100)
NRBC # BLD: 0 /100 WBCS — SIGNIFICANT CHANGE UP (ref 0–0)
OB PNL STL: NEGATIVE — SIGNIFICANT CHANGE UP
PHOSPHATE SERPL-MCNC: 3.6 MG/DL — SIGNIFICANT CHANGE UP (ref 2.5–4.5)
PLATELET # BLD AUTO: 285 K/UL — SIGNIFICANT CHANGE UP (ref 150–400)
POTASSIUM SERPL-MCNC: 4 MMOL/L — SIGNIFICANT CHANGE UP (ref 3.5–5.3)
POTASSIUM SERPL-SCNC: 4 MMOL/L — SIGNIFICANT CHANGE UP (ref 3.5–5.3)
RBC # BLD: 2.65 M/UL — LOW (ref 3.8–5.2)
RBC # FLD: 15.7 % — HIGH (ref 10.3–14.5)
SODIUM SERPL-SCNC: 143 MMOL/L — SIGNIFICANT CHANGE UP (ref 135–145)
SPECIMEN SOURCE: SIGNIFICANT CHANGE UP
WBC # BLD: 5.56 K/UL — SIGNIFICANT CHANGE UP (ref 3.8–10.5)
WBC # FLD AUTO: 5.56 K/UL — SIGNIFICANT CHANGE UP (ref 3.8–10.5)

## 2023-07-10 PROCEDURE — 99223 1ST HOSP IP/OBS HIGH 75: CPT

## 2023-07-10 RX ORDER — ERTAPENEM SODIUM 1 G/1
INJECTION, POWDER, LYOPHILIZED, FOR SOLUTION INTRAMUSCULAR; INTRAVENOUS
Refills: 0 | Status: DISCONTINUED | OUTPATIENT
Start: 2023-07-10 | End: 2023-07-13

## 2023-07-10 RX ORDER — ERTAPENEM SODIUM 1 G/1
1000 INJECTION, POWDER, LYOPHILIZED, FOR SOLUTION INTRAMUSCULAR; INTRAVENOUS EVERY 24 HOURS
Refills: 0 | Status: DISCONTINUED | OUTPATIENT
Start: 2023-07-11 | End: 2023-07-13

## 2023-07-10 RX ORDER — PANTOPRAZOLE SODIUM 20 MG/1
40 TABLET, DELAYED RELEASE ORAL EVERY 12 HOURS
Refills: 0 | Status: DISCONTINUED | OUTPATIENT
Start: 2023-07-10 | End: 2023-07-13

## 2023-07-10 RX ORDER — SODIUM CHLORIDE 9 MG/ML
1000 INJECTION, SOLUTION INTRAVENOUS
Refills: 0 | Status: DISCONTINUED | OUTPATIENT
Start: 2023-07-10 | End: 2023-07-10

## 2023-07-10 RX ORDER — ERTAPENEM SODIUM 1 G/1
1000 INJECTION, POWDER, LYOPHILIZED, FOR SOLUTION INTRAMUSCULAR; INTRAVENOUS ONCE
Refills: 0 | Status: COMPLETED | OUTPATIENT
Start: 2023-07-10 | End: 2023-07-10

## 2023-07-10 RX ORDER — DEXTROSE MONOHYDRATE, SODIUM CHLORIDE, AND POTASSIUM CHLORIDE 50; .745; 4.5 G/1000ML; G/1000ML; G/1000ML
1000 INJECTION, SOLUTION INTRAVENOUS
Refills: 0 | Status: DISCONTINUED | OUTPATIENT
Start: 2023-07-10 | End: 2023-07-11

## 2023-07-10 RX ADMIN — PIPERACILLIN AND TAZOBACTAM 25 GRAM(S): 4; .5 INJECTION, POWDER, LYOPHILIZED, FOR SOLUTION INTRAVENOUS at 05:35

## 2023-07-10 RX ADMIN — Medication 81 MILLIGRAM(S): at 12:30

## 2023-07-10 RX ADMIN — ATORVASTATIN CALCIUM 40 MILLIGRAM(S): 80 TABLET, FILM COATED ORAL at 21:48

## 2023-07-10 RX ADMIN — DEXTROSE MONOHYDRATE, SODIUM CHLORIDE, AND POTASSIUM CHLORIDE 75 MILLILITER(S): 50; .745; 4.5 INJECTION, SOLUTION INTRAVENOUS at 21:49

## 2023-07-10 RX ADMIN — DONEPEZIL HYDROCHLORIDE 5 MILLIGRAM(S): 10 TABLET, FILM COATED ORAL at 21:48

## 2023-07-10 RX ADMIN — DEXTROSE MONOHYDRATE, SODIUM CHLORIDE, AND POTASSIUM CHLORIDE 75 MILLILITER(S): 50; .745; 4.5 INJECTION, SOLUTION INTRAVENOUS at 14:37

## 2023-07-10 RX ADMIN — SODIUM CHLORIDE 100 MILLILITER(S): 9 INJECTION, SOLUTION INTRAVENOUS at 00:26

## 2023-07-10 RX ADMIN — AMLODIPINE BESYLATE 5 MILLIGRAM(S): 2.5 TABLET ORAL at 22:08

## 2023-07-10 RX ADMIN — PANTOPRAZOLE SODIUM 40 MILLIGRAM(S): 20 TABLET, DELAYED RELEASE ORAL at 08:06

## 2023-07-10 RX ADMIN — ERTAPENEM SODIUM 120 MILLIGRAM(S): 1 INJECTION, POWDER, LYOPHILIZED, FOR SOLUTION INTRAMUSCULAR; INTRAVENOUS at 08:06

## 2023-07-10 RX ADMIN — ENOXAPARIN SODIUM 40 MILLIGRAM(S): 100 INJECTION SUBCUTANEOUS at 21:48

## 2023-07-10 RX ADMIN — PANTOPRAZOLE SODIUM 40 MILLIGRAM(S): 20 TABLET, DELAYED RELEASE ORAL at 17:20

## 2023-07-10 NOTE — PROGRESS NOTE ADULT - ASSESSMENT
83F with hx of TIA on ASA, HTN, HLD, vascular dementia and ventral hernia who presents to hospital after being found down at assisted living facility with abdominal pain and possible hematemesis. Subjective exam and HPI limited as patient with dementia. CT scan in the ED concerning for acute on chronic diverticulitis with two small mahsa-sigmoid collections concerning for perforation. Collections 3.7x1.2cm and 2.7x1.2cm. Unlikely to benefit from IR drainage at this time so will defer IR consult for now. No overnight events, vitals in normal parameters, no bowel movements but patient refers passing gas.     Plan:   - NPO with s/c  - Enoxaparin (Lovenox) 40mg SC QD  - Continue Zosyn 3.375g stop after 7 days  - IVF LR 100cc/hr  - Follow up AM labs  - Home meds  - FOB test  - Possible EGD with GI today 83F with hx of TIA on ASA, HTN, HLD, vascular dementia and ventral hernia who presents to hospital after being found down at assisted living facility with abdominal pain and possible hematemesis. Subjective exam and HPI limited as patient with dementia. CT scan in the ED concerning for acute on chronic diverticulitis with two small mahsa-sigmoid collections concerning for perforation. Collections 3.7x1.2cm and 2.7x1.2cm. No overnight events, vitals in normal parameters, no bowel movements but patient refers passing gas.     Plan:   - IR consult for abscess drainage  - Possible EGD with GI today  - NPO with s/c  - Enoxaparin (Lovenox) 40mg SC QD  - Continue Zosyn 3.375g stop after 7 days  - IVF LR 100cc/hr  - Follow up AM labs  - Home meds  - FOB test    [facility]  [id number]   83F with hx of TIA on ASA, HTN, HLD, vascular dementia and ventral hernia who presents to hospital after being found down at assisted living facility with abdominal pain and possible hematemesis. Subjective exam and HPI limited as patient with dementia. CT scan in the ED concerning for acute on chronic diverticulitis with two small mahsa-sigmoid collections concerning for perforation. Collections 3.7x1.2cm and 2.7x1.2cm. No overnight events, vitals in normal parameters, no bowel movements but patient refers passing gas.     Plan:   - Possible IR consult for abscess drainage  - Possible EGD with GI today  - NPO with s/c  - Enoxaparin (Lovenox) 40mg SC QD  - Continue Zosyn 3.375g stop after 7 days  - IVF LR 100cc/hr  - Follow up AM labs  - Home meds  - FOB test    [facility]  [id number]   83F with hx of TIA on ASA, HTN, HLD, vascular dementia and ventral hernia who presents to hospital after being found down at assisted living facility with abdominal pain and possible hematemesis. Subjective exam and HPI limited as patient with dementia. CT scan in the ED concerning for acute on chronic diverticulitis with two small mahsa-sigmoid collections concerning for perforation. Collections 3.7x1.2cm and 2.7x1.2cm. No overnight events, vitals in normal parameters, no bowel movements but patient refers passing gas.     Plan:   - Possible EGD with GI today  - NPO with s/c  - Enoxaparin (Lovenox) 40mg SC QD  - Continue Zosyn 3.375g stop after 7 days  - IVF LR 100cc/hr  - Follow up AM labs  - Home meds    [facility]  [id number]   83F with hx of TIA on ASA, HTN, HLD, vascular dementia and ventral hernia who presents to hospital after being found down at assisted living facility with abdominal pain and possible hematemesis. Subjective exam and HPI limited as patient with dementia. CT scan in the ED concerning for acute on chronic diverticulitis with two small mahsa-sigmoid collections concerning for perforation. Collections 3.7x1.2cm and 2.7x1.2cm. No overnight events, vitals in normal parameters, no bowel movements but patient refers passing gas.     Plan:   - Possible EGD with GI today  - NPO with s/c  - Enoxaparin (Lovenox) 40mg SC QD  - Continue Zosyn 3.375g stop after 7 days  - IVF LR 100cc/hr  - Follow up AM labs  - Home meds    [facility]  p90   83F with hx of TIA on ASA, HTN, HLD, vascular dementia and ventral hernia who presents to hospital after being found down at assisted living facility with abdominal pain and possible hematemesis. Subjective exam and HPI limited as patient with dementia. CT scan in the ED concerning for acute on chronic diverticulitis with two small mahsa-sigmoid collections concerning for perforation. Collections 3.7x1.2cm and 2.7x1.2cm. No overnight events, vitals in normal parameters, no bowel movements but patient refers passing gas.     Plan:   - Possible EGD with GI today  - NPO with s/c  - Enoxaparin (Lovenox) 40mg SC QD  - Continue Zosyn 3.375g stop after 7 days  - IVF LR 100cc/hr  - Follow up AM labs  - Home meds    [facility]  [id number]

## 2023-07-10 NOTE — PROGRESS NOTE ADULT - SUBJECTIVE AND OBJECTIVE BOX
[facility] Daily Resident Progress Note    Reason for admission: Acute on chronic sigmoid diverticulitis with perisigmoid and rectosigmoid air/fluid collections    SUBJECTIVE: Pt seen and examined at bedside alone with no family members. Refers no pain, no nausea, no vomit, no fevers and no chills.     OVERNIGHT EVENTS:     OBJECTIVE:  Vital Signs Last 24 Hrs  T(C): 36.8 (2023 22:37), Max: 36.9 (2023 10:44)  T(F): 98.3 (2023 22:37), Max: 98.5 (2023 10:44)  HR: 89 (2023 22:37) (89 - 109)  BP: 121/75 (2023 22:37) (107/75 - 122/72)  BP(mean): --  RR: 18 (2023 22:37) (18 - 20)  SpO2: 97% (2023 22:37) (97% - 100%)    Parameters below as of 2023 22:37  Patient On (Oxygen Delivery Method): room air        I&O's Summary      Allergies:  No Known Allergies      Medications:  MEDICATIONS  (STANDING):  amLODIPine   Tablet 5 milliGRAM(s) Oral every 24 hours  aspirin  chewable 81 milliGRAM(s) Oral daily  atorvastatin 40 milliGRAM(s) Oral at bedtime  donepezil 5 milliGRAM(s) Oral at bedtime  enoxaparin Injectable 40 milliGRAM(s) SubCutaneous every 24 hours  lactated ringers. 1000 milliLiter(s) (100 mL/Hr) IV Continuous <Continuous>  piperacillin/tazobactam IVPB.. 3.375 Gram(s) IV Intermittent every 8 hours    MEDICATIONS  (PRN):      Physical Exam:  General Appearance: Appears well, NAD, A&Ox2, resting comfortable on bed, no distress.  Chest: Equal expansion bilaterally, no increased WOB, decreased bilateral air entry  CV: RRR, WWP.   Abdomen: soft, moderately distended, timpanic, no voluntary/involuntary guarding   Extremities: No swelling, asymmetry. Articular deformities in hands and feet appreciated.     Labs:                        7.9    6.56  )-----------( 285      ( 2023 10:49 )             26.5     07-09    140  |  105  |  25<H>  ----------------------------<  93  4.4   |  19<L>  |  1.19    Ca    9.1      2023 10:49    TPro  6.9  /  Alb  2.7<L>  /  TBili  0.4  /  DBili  x   /  AST  27  /  ALT  22  /  AlkPhos  88  07-09      Urinalysis Basic - ( 2023 11:04 )    Color: Light Yellow / Appearance: Clear / S.013 / pH: x  Gluc: x / Ketone: Negative  / Bili: Negative / Urobili: 2 mg/dL   Blood: x / Protein: Trace / Nitrite: Negative   Leuk Esterase: Large / RBC: 2 /hpf / WBC 8 /HPF   Sq Epi: x / Non Sq Epi: x / Bacteria: Negative      CAPILLARY BLOOD GLUCOSE         [facility] Daily Resident Progress Note    Reason for admission: Acute on chronic sigmoid diverticulitis with perisigmoid and rectosigmoid air/fluid collections    SUBJECTIVE: Pt seen and examined at bedside alone with no family members. Refers no pain, no nausea, no vomit, no fevers and no chills.     OVERNIGHT EVENTS:     OBJECTIVE:  Vital Signs Last 24 Hrs  T(C): 36.8 (2023 22:37), Max: 36.9 (2023 10:44)  T(F): 98.3 (2023 22:37), Max: 98.5 (2023 10:44)  HR: 89 (2023 22:37) (89 - 109)  BP: 121/75 (2023 22:37) (107/75 - 122/72)  BP(mean): --  RR: 18 (2023 22:37) (18 - 20)  SpO2: 97% (2023 22:37) (97% - 100%)    Parameters below as of 2023 22:37  Patient On (Oxygen Delivery Method): room air        I&O's Summary      Allergies:  No Known Allergies      Medications:  MEDICATIONS  (STANDING):  amLODIPine   Tablet 5 milliGRAM(s) Oral every 24 hours  aspirin  chewable 81 milliGRAM(s) Oral daily  atorvastatin 40 milliGRAM(s) Oral at bedtime  donepezil 5 milliGRAM(s) Oral at bedtime  enoxaparin Injectable 40 milliGRAM(s) SubCutaneous every 24 hours  lactated ringers. 1000 milliLiter(s) (100 mL/Hr) IV Continuous <Continuous>  piperacillin/tazobactam IVPB.. 3.375 Gram(s) IV Intermittent every 8 hours    MEDICATIONS  (PRN):      Physical Exam:  General Appearance: Appears well, NAD, A&Ox2, resting comfortable on bed, no distress.  Chest: Equal expansion bilaterally, no increased WOB, decreased bilateral air entry  CV: RRR, WWP.   Abdomen: soft, moderately distended, timpanic, no voluntary/involuntary guarding   Extremities: No swelling, asymmetry. Articular deformities in hands and feet appreciated.     Labs:                        7.9    6.56  )-----------( 285      ( 2023 10:49 )             26.5     07-09    140  |  105  |  25<H>  ----------------------------<  93  4.4   |  19<L>  |  1.19    Ca    9.1      2023 10:49    TPro  6.9  /  Alb  2.7<L>  /  TBili  0.4  /  DBili  x   /  AST  27  /  ALT  22  /  AlkPhos  88  07-09      Urinalysis Basic - ( 2023 11:04 )    Color: Light Yellow / Appearance: Clear / S.013 / pH: x  Gluc: x / Ketone: Negative  / Bili: Negative / Urobili: 2 mg/dL   Blood: x / Protein: Trace / Nitrite: Negative   Leuk Esterase: Large / RBC: 2 /hpf / WBC 8 /HPF   Sq Epi: x / Non Sq Epi: x / Bacteria: Negative      CAPILLARY BLOOD GLUCOSE         [facility] Daily Resident Progress Note    Reason for admission: Acute on chronic sigmoid diverticulitis with perisigmoid and rectosigmoid air/fluid collections    SUBJECTIVE: Pt seen and examined at bedside alone with no family members. Refers no pain, no nausea, no vomit, no fevers and no chills.     OVERNIGHT EVENTS:     OBJECTIVE:  Vital Signs Last 24 Hrs  T(C): 36.8 (2023 22:37), Max: 36.9 (2023 10:44)  T(F): 98.3 (2023 22:37), Max: 98.5 (2023 10:44)  HR: 89 (2023 22:37) (89 - 109)  BP: 121/75 (2023 22:37) (107/75 - 122/72)  BP(mean): --  RR: 18 (2023 22:37) (18 - 20)  SpO2: 97% (2023 22:37) (97% - 100%)    Parameters below as of 2023 22:37  Patient On (Oxygen Delivery Method): room air        I&O's Summary      Allergies:  No Known Allergies      Medications:  MEDICATIONS  (STANDING):  amLODIPine   Tablet 5 milliGRAM(s) Oral every 24 hours  aspirin  chewable 81 milliGRAM(s) Oral daily  atorvastatin 40 milliGRAM(s) Oral at bedtime  donepezil 5 milliGRAM(s) Oral at bedtime  enoxaparin Injectable 40 milliGRAM(s) SubCutaneous every 24 hours  lactated ringers. 1000 milliLiter(s) (100 mL/Hr) IV Continuous <Continuous>  piperacillin/tazobactam IVPB.. 3.375 Gram(s) IV Intermittent every 8 hours    MEDICATIONS  (PRN):      Physical Exam:  General Appearance: Appears well, NAD, A&Ox2, resting comfortable on bed, no distress.  Chest: Equal expansion bilaterally, no increased WOB, decreased bilateral air entry  CV: RRR, WWP.   Abdomen: soft, moderately distended, timpanic, no voluntary/involuntary guarding   Extremities: No swelling, asymmetry. Articular deformities in hands and feet appreciated.     Labs:                        7.9    6.56  )-----------( 285      ( 2023 10:49 )             26.5     07-09    140  |  105  |  25<H>  ----------------------------<  93  4.4   |  19<L>  |  1.19    Ca    9.1      2023 10:49    TPro  6.9  /  Alb  2.7<L>  /  TBili  0.4  /  DBili  x   /  AST  27  /  ALT  22  /  AlkPhos  88  07-09      Urinalysis Basic - ( 2023 11:04 )    Color: Light Yellow / Appearance: Clear / S.013 / pH: x  Gluc: x / Ketone: Negative  / Bili: Negative / Urobili: 2 mg/dL   Blood: x / Protein: Trace / Nitrite: Negative   Leuk Esterase: Large / RBC: 2 /hpf / WBC 8 /HPF   Sq Epi: x / Non Sq Epi: x / Bacteria: Negative      CAPILLARY BLOOD GLUCOSE

## 2023-07-10 NOTE — CONSULT NOTE ADULT - SUBJECTIVE AND OBJECTIVE BOX
INIITIAL GI CONSULTATION    Patient is a 83y old  Female who presents with a chief complaint of AMS  HPI:  82yo w/ PMHx TIA on ASA, HTN, HLD, vascular dementia and ventral hernia who was reportedly brought in by EMS from assisted living after being found down at assisted living w/ episode of reported hematemesis. GI being consulted for hematemesis.    Patient unable to provide much history given dementia (stories differ throughout interview). Family at bedside and reporting assisted living noted she has seemed more lethargic and altered over past few days w/ abdominal pain and day of presentation had episode of vomiting. Denies any nausea now, any history of melena or hematochezia or fevers/chills. ASA use but denies any NSAIDs/smoking. Otherwise reports she feels well currently. Regularly has 2 BMs/day, last reportedly 2 days ago. Denies any urinary sxs. Per patient and family, no prior colonoscopy or endoscopy.     In ED, HD stable w/ /75, Hb 7.5 (unknown b/l), BUn/Cr 19/1.15. CT showed small hiatal hernia, moderate L hydrouretonephrosis, large ventral hernia containing small and large bowel w/o incarceration, acute on chronic diverticulitis w/ perisigmoid and rectosigmoid air/fluid collections w/ adjacent inflammatory changes in the distal L. ureter. Signs of ?UTI.         PMH/PSH:  PAST MEDICAL & SURGICAL HISTORY:  Breast cancer      HLD (hyperlipidemia)      Vascular dementia      History of TIAs        FH:  FAMILY HISTORY:      MEDS:  MEDICATIONS  (STANDING):  amLODIPine   Tablet 5 milliGRAM(s) Oral every 24 hours  aspirin  chewable 81 milliGRAM(s) Oral daily  atorvastatin 40 milliGRAM(s) Oral at bedtime  donepezil 5 milliGRAM(s) Oral at bedtime  enoxaparin Injectable 40 milliGRAM(s) SubCutaneous every 24 hours  ertapenem  IVPB      lactated ringers. 1000 milliLiter(s) (75 mL/Hr) IV Continuous <Continuous>  pantoprazole    Tablet 40 milliGRAM(s) Oral every 12 hours    MEDICATIONS  (PRN):    Allergies    No Known Allergies    Intolerances          ROS limited by dementia  CONSTITUTIONAL:  No weight loss, fever, chills, weakness or fatigue.  HEENT:  Eyes:  No visual loss, blurred vision, double vision or yellow sclerae. Ears, Nose, Throat:  No hearing loss, sneezing, congestion, runny nose or sore throat.  SKIN:  No rash or itching.  CARDIOVASCULAR:  No chest pain, chest pressure or chest discomfort. No palpitations or edema.  RESPIRATORY:  No shortness of breath, cough or sputum.  GASTROINTESTINAL:  SEE HPI  GENITOURINARY:  No dysuria, hematuria, urinary frequency  NEUROLOGICAL:  No headache, dizziness, syncope, paralysis, ataxia, numbness or tingling in the extremities. No change in bowel or bladder control.  MUSCULOSKELETAL:  No muscle, back pain, joint pain or stiffness.  HEMATOLOGIC:  No anemia, bleeding or bruising.  LYMPHATICS:  No enlarged nodes. No history of splenectomy.  PSYCHIATRIC:  No history of depression or anxiety.  ENDOCRINOLOGIC:  No reports of sweating, cold or heat intolerance. No polyuria or polydipsia.      ______________________________________________________________________  PHYSICAL EXAM:  T(C): 37 (07-10-23 @ 09:13), Max: 37 (07-10-23 @ 09:13)  HR: 85 (07-10-23 @ 09:13)  BP: 110/65 (07-10-23 @ 09:13)  RR: 18 (07-10-23 @ 09:13)  SpO2: 98% (07-10-23 @ 09:13)  Wt(kg): --    07-09  -  07-10  --------------------------------------------------------  IN:    IV PiggyBack: 200 mL    Lactated Ringers: 750 mL  Total IN: 950 mL    OUT:  Total OUT: 0 mL    Total NET: 950 mL      07-10  -  07-10  --------------------------------------------------------  IN:  Total IN: 0 mL    OUT:    Oral Fluid: 0 mL  Total OUT: 0 mL    Total NET: 0 mL          GEN: NAD, normocephalic   CVS: S1S2+  CHEST: Speaking in full sentences, no signs of resp distress  ABD: soft , mildly distendend, non tender w/o rebound/guarding  EXTR: no cyanosis, no clubbing, no edema  NEURO: Awake and alert; oriented x3  SKIN:  warm;  non icteric    ______________________________________________________________________  LABS:                        7.5    5.56  )-----------( 285      ( 10 Jul 2023 06:04 )             25.1     07-10    143  |  108  |  19  ----------------------------<  98  4.0   |  23  |  1.15    Ca    9.0      10 Jul 2023 06:04  Phos  3.6     07-10  Mg     2.1     07-10    TPro  6.9  /  Alb  2.7<L>  /  TBili  0.4  /  DBili  x   /  AST  27  /  ALT  22  /  AlkPhos  88  07-09    LIVER FUNCTIONS - ( 09 Jul 2023 10:49 )  Alb: 2.7 g/dL / Pro: 6.9 g/dL / ALK PHOS: 88 U/L / ALT: 22 U/L / AST: 27 U/L / GGT: x             ____________________________________________    IMAGING:  < from: CT Abdomen and Pelvis w/ IV Cont (07.09.23 @ 14:09) >  FINDINGS:    LOWER CHEST: No visualized pleural effusion. Minimal dependent lung   scarring. Aortic valve and multivessel coronary artery calcifications.   Ectasia of the partially imaged mid ascending thoracic aorta measuring   3.8 cm.    LIVER: Liver size is borderline enlarged, 18 cm in craniocaudal   dimension. Subcentimeter hepatic hypodensities are too small to   characterize. Remaining portal vein and hepatic veins are patent.  BILE DUCTS: No distention  GALLBLADDER: Unremarkable CT appearance  SPLEEN: Spleen size within normal limits  PANCREAS: No acute peripancreatic inflammation  ADRENALS: Unremarkable  KIDNEYS/URETERS: Moderate left hydroureteronephrosis and ureteral   enhancement extending to the left hemipelvis adjacent to inflamed sigmoid   colon, further described below. Mild fullness of the right renal   collecting system without overt hydronephrosis. Mild right ureteral   enhancement as well. Bilateral renal collecting systems are partially   duplicated to the proximal ureters.    BLADDER: Incompletely assessed due to streak artifact from bilateral hip   prostheses. Pelvic inflammatory changes extend to the urinary bladder   dome. Urinary bladder does not contain air to suggest colovesicular   fistula, however is suboptimally assessed due to streak artifact.  REPRODUCTIVE ORGANS: Probable prior hysterectomy. Suboptimal evaluation   due to streak artifact. Soft tissue density in the mid pelvis extends up   to the inflamed colon, further described below.    BOWEL and PERITONEUM: Limited noncontrast evaluation. Small hiatal   hernia. Stomach is underdistended. Small duodenal diverticulum. No small   bowel distention. Large ventral hernia contains nonobstructed small and   large bowel. Mild stool burden of the colon limits evaluation of the   colonic mucosa. Extensive colonic diverticulosis with marked inflammatory   changes of acute on chronic sigmoid diverticulitis. Perisigmoid   collection containing air and small amount of fluid measures 3.7 x 1.2   cm, image 91 series 2, and is closely associated with the inflamed left   ureter. Additional dependent pelvic mahsa-rectosigmoid air/fluid measures   2.7 x 1.2 cm, near the vaginal cuff. Inflammatory changes extend to the   urinary bladder dome. Urinary bladder does not contain air, however   suboptimally assessed due to streak artifact.  PERITONEUM: See above 'bowel and peritoneum' section.  VESSELS: No abdominal aortic aneurysm. Aortoiliac an visceral artery   atheromatous changes.  RETROPERITONEUM/LYMPH NODES: Small volume nodes.  ABDOMINAL WALL: Large ventral midline hernia containing nonobstructed   small and large bowel.  BONES: Bilateral hip prostheses partially imaged, with surrounding streak   artifact limiting evaluation of the intrapelvic structures. Diffuse   osseous demineralization and multilevel degenerative changes of the   bones. Slightly scoliotic spinal curvature. Multilevel vertebral body   ankylosis, for example at T11-T12, L2-L3, and L3-L4 levels. Grade 1   anterolisthesis of L4 and L5. Central canal and neural foramina are not   adequately assessed on this study.    IMPRESSION:    Large ventral midline hernia containing nonobstructed small and large   bowel. Small hiatal hernia. No bowel obstruction.    Acute on chronic sigmoid diverticulitis with perisigmoid and rectosigmoid   air/fluid collections measuring 3.7 x 1.2 cm and 2.7 x 1.2 cm,   respectively. Adjacent inflammatory changes of the distal left ureter   associated with moderate left hydroureteronephrosis. Bilateral ureteral   enhancement may reflect urinary tract infection.    Pelvic inflammatory changes extend to the urinary bladder dome. Urinary   bladder does not contain air to suggest colovesicular fistula, however is   suboptimally assessed due to streak artifact.      < end of copied text >    ______________________________________________________________________              INIITIAL GI CONSULTATION    Patient is a 83y old  Female who presents with a chief complaint of AMS  HPI:  84yo w/ PMHx TIA on ASA, HTN, HLD, vascular dementia and ventral hernia who was reportedly brought in by EMS from assisted living after being found down at assisted living w/ episode of reported hematemesis. GI being consulted for hematemesis.    Patient unable to provide much history given dementia (stories differ throughout interview). Family at bedside and reporting assisted living noted she has seemed more lethargic and altered over past few days w/ abdominal pain and day of presentation had episode of vomiting. Denies any nausea now, any history of melena or hematochezia or fevers/chills. ASA use but denies any NSAIDs/smoking. Otherwise reports she feels well currently. Regularly has 2 BMs/day, last reportedly 2 days ago. Denies any urinary sxs. Per patient and family, no prior colonoscopy or endoscopy.     In ED, HD stable w/ /75, Hb 7.5 (unknown b/l), BUn/Cr 19/1.15. CT showed small hiatal hernia, moderate L hydrouretonephrosis, large ventral hernia containing small and large bowel w/o incarceration, acute on chronic diverticulitis w/ perisigmoid and rectosigmoid air/fluid collections w/ adjacent inflammatory changes in the distal L. ureter. Signs of ?UTI.         PMH/PSH:  PAST MEDICAL & SURGICAL HISTORY:  Breast cancer      HLD (hyperlipidemia)      Vascular dementia      History of TIAs        FH:  FAMILY HISTORY:      MEDS:  MEDICATIONS  (STANDING):  amLODIPine   Tablet 5 milliGRAM(s) Oral every 24 hours  aspirin  chewable 81 milliGRAM(s) Oral daily  atorvastatin 40 milliGRAM(s) Oral at bedtime  donepezil 5 milliGRAM(s) Oral at bedtime  enoxaparin Injectable 40 milliGRAM(s) SubCutaneous every 24 hours  ertapenem  IVPB      lactated ringers. 1000 milliLiter(s) (75 mL/Hr) IV Continuous <Continuous>  pantoprazole    Tablet 40 milliGRAM(s) Oral every 12 hours    MEDICATIONS  (PRN):    Allergies    No Known Allergies    Intolerances          ROS limited by dementia  CONSTITUTIONAL:  No weight loss, fever, chills, weakness or fatigue.  HEENT:  Eyes:  No visual loss, blurred vision, double vision or yellow sclerae. Ears, Nose, Throat:  No hearing loss, sneezing, congestion, runny nose or sore throat.  SKIN:  No rash or itching.  CARDIOVASCULAR:  No chest pain, chest pressure or chest discomfort. No palpitations or edema.  RESPIRATORY:  No shortness of breath, cough or sputum.  GASTROINTESTINAL:  SEE HPI  GENITOURINARY:  No dysuria, hematuria, urinary frequency  NEUROLOGICAL:  No headache, dizziness, syncope, paralysis, ataxia, numbness or tingling in the extremities. No change in bowel or bladder control.  MUSCULOSKELETAL:  No muscle, back pain, joint pain or stiffness.  HEMATOLOGIC:  No anemia, bleeding or bruising.  LYMPHATICS:  No enlarged nodes. No history of splenectomy.  PSYCHIATRIC:  No history of depression or anxiety.  ENDOCRINOLOGIC:  No reports of sweating, cold or heat intolerance. No polyuria or polydipsia.      ______________________________________________________________________  PHYSICAL EXAM:  T(C): 37 (07-10-23 @ 09:13), Max: 37 (07-10-23 @ 09:13)  HR: 85 (07-10-23 @ 09:13)  BP: 110/65 (07-10-23 @ 09:13)  RR: 18 (07-10-23 @ 09:13)  SpO2: 98% (07-10-23 @ 09:13)  Wt(kg): --    07-09  -  07-10  --------------------------------------------------------  IN:    IV PiggyBack: 200 mL    Lactated Ringers: 750 mL  Total IN: 950 mL    OUT:  Total OUT: 0 mL    Total NET: 950 mL      07-10  -  07-10  --------------------------------------------------------  IN:  Total IN: 0 mL    OUT:    Oral Fluid: 0 mL  Total OUT: 0 mL    Total NET: 0 mL          GEN: NAD, normocephalic   CVS: S1S2+  CHEST: Speaking in full sentences, no signs of resp distress  ABD: soft , mildly distendend, non tender w/o rebound/guarding  EXTR: no cyanosis, no clubbing, no edema  NEURO: Awake and alert; oriented x3  SKIN:  warm;  non icteric    ______________________________________________________________________  LABS:                        7.5    5.56  )-----------( 285      ( 10 Jul 2023 06:04 )             25.1     07-10    143  |  108  |  19  ----------------------------<  98  4.0   |  23  |  1.15    Ca    9.0      10 Jul 2023 06:04  Phos  3.6     07-10  Mg     2.1     07-10    TPro  6.9  /  Alb  2.7<L>  /  TBili  0.4  /  DBili  x   /  AST  27  /  ALT  22  /  AlkPhos  88  07-09    LIVER FUNCTIONS - ( 09 Jul 2023 10:49 )  Alb: 2.7 g/dL / Pro: 6.9 g/dL / ALK PHOS: 88 U/L / ALT: 22 U/L / AST: 27 U/L / GGT: x             ____________________________________________    IMAGING:  < from: CT Abdomen and Pelvis w/ IV Cont (07.09.23 @ 14:09) >  FINDINGS:    LOWER CHEST: No visualized pleural effusion. Minimal dependent lung   scarring. Aortic valve and multivessel coronary artery calcifications.   Ectasia of the partially imaged mid ascending thoracic aorta measuring   3.8 cm.    LIVER: Liver size is borderline enlarged, 18 cm in craniocaudal   dimension. Subcentimeter hepatic hypodensities are too small to   characterize. Remaining portal vein and hepatic veins are patent.  BILE DUCTS: No distention  GALLBLADDER: Unremarkable CT appearance  SPLEEN: Spleen size within normal limits  PANCREAS: No acute peripancreatic inflammation  ADRENALS: Unremarkable  KIDNEYS/URETERS: Moderate left hydroureteronephrosis and ureteral   enhancement extending to the left hemipelvis adjacent to inflamed sigmoid   colon, further described below. Mild fullness of the right renal   collecting system without overt hydronephrosis. Mild right ureteral   enhancement as well. Bilateral renal collecting systems are partially   duplicated to the proximal ureters.    BLADDER: Incompletely assessed due to streak artifact from bilateral hip   prostheses. Pelvic inflammatory changes extend to the urinary bladder   dome. Urinary bladder does not contain air to suggest colovesicular   fistula, however is suboptimally assessed due to streak artifact.  REPRODUCTIVE ORGANS: Probable prior hysterectomy. Suboptimal evaluation   due to streak artifact. Soft tissue density in the mid pelvis extends up   to the inflamed colon, further described below.    BOWEL and PERITONEUM: Limited noncontrast evaluation. Small hiatal   hernia. Stomach is underdistended. Small duodenal diverticulum. No small   bowel distention. Large ventral hernia contains nonobstructed small and   large bowel. Mild stool burden of the colon limits evaluation of the   colonic mucosa. Extensive colonic diverticulosis with marked inflammatory   changes of acute on chronic sigmoid diverticulitis. Perisigmoid   collection containing air and small amount of fluid measures 3.7 x 1.2   cm, image 91 series 2, and is closely associated with the inflamed left   ureter. Additional dependent pelvic mahsa-rectosigmoid air/fluid measures   2.7 x 1.2 cm, near the vaginal cuff. Inflammatory changes extend to the   urinary bladder dome. Urinary bladder does not contain air, however   suboptimally assessed due to streak artifact.  PERITONEUM: See above 'bowel and peritoneum' section.  VESSELS: No abdominal aortic aneurysm. Aortoiliac an visceral artery   atheromatous changes.  RETROPERITONEUM/LYMPH NODES: Small volume nodes.  ABDOMINAL WALL: Large ventral midline hernia containing nonobstructed   small and large bowel.  BONES: Bilateral hip prostheses partially imaged, with surrounding streak   artifact limiting evaluation of the intrapelvic structures. Diffuse   osseous demineralization and multilevel degenerative changes of the   bones. Slightly scoliotic spinal curvature. Multilevel vertebral body   ankylosis, for example at T11-T12, L2-L3, and L3-L4 levels. Grade 1   anterolisthesis of L4 and L5. Central canal and neural foramina are not   adequately assessed on this study.    IMPRESSION:    Large ventral midline hernia containing nonobstructed small and large   bowel. Small hiatal hernia. No bowel obstruction.    Acute on chronic sigmoid diverticulitis with perisigmoid and rectosigmoid   air/fluid collections measuring 3.7 x 1.2 cm and 2.7 x 1.2 cm,   respectively. Adjacent inflammatory changes of the distal left ureter   associated with moderate left hydroureteronephrosis. Bilateral ureteral   enhancement may reflect urinary tract infection.    Pelvic inflammatory changes extend to the urinary bladder dome. Urinary   bladder does not contain air to suggest colovesicular fistula, however is   suboptimally assessed due to streak artifact.      < end of copied text >    ______________________________________________________________________              INIITIAL GI CONSULTATION    Patient is a 83y old  Female who presents with a chief complaint of AMS  HPI:  82yo w/ PMHx TIA on ASA, HTN, HLD, vascular dementia and ventral hernia who was reportedly brought in by EMS from assisted living after being found down at assisted living w/ episode of reported hematemesis. GI being consulted for hematemesis.    Patient unable to provide much history given dementia (stories differ throughout interview). Family at bedside and reporting assisted living noted she has seemed more lethargic and altered over past few days w/ abdominal pain and day of presentation had episode of vomiting. Denies any nausea now, any history of melena or hematochezia or fevers/chills. ASA use but denies any NSAIDs/smoking. Otherwise reports she feels well currently. Regularly has 2 BMs/day, last reportedly 2 days ago. Denies any urinary sxs. Per patient and family, no prior colonoscopy or endoscopy.     In ED, HD stable w/ /75, Hb 7.5 (unknown b/l), BUn/Cr 19/1.15. CT showed small hiatal hernia, moderate L hydrouretonephrosis, large ventral hernia containing small and large bowel w/o incarceration, acute on chronic diverticulitis w/ perisigmoid and rectosigmoid air/fluid collections w/ adjacent inflammatory changes in the distal L. ureter. Signs of ?UTI. Being followed by colorectal surgery who are recommending possible IR intervention for abscess drainage.        PMH/PSH:  PAST MEDICAL & SURGICAL HISTORY:  Breast cancer      HLD (hyperlipidemia)      Vascular dementia      History of TIAs        FH:  FAMILY HISTORY:      MEDS:  MEDICATIONS  (STANDING):  amLODIPine   Tablet 5 milliGRAM(s) Oral every 24 hours  aspirin  chewable 81 milliGRAM(s) Oral daily  atorvastatin 40 milliGRAM(s) Oral at bedtime  donepezil 5 milliGRAM(s) Oral at bedtime  enoxaparin Injectable 40 milliGRAM(s) SubCutaneous every 24 hours  ertapenem  IVPB      lactated ringers. 1000 milliLiter(s) (75 mL/Hr) IV Continuous <Continuous>  pantoprazole    Tablet 40 milliGRAM(s) Oral every 12 hours    MEDICATIONS  (PRN):    Allergies    No Known Allergies    Intolerances          ROS limited by dementia  CONSTITUTIONAL:  No weight loss, fever, chills, weakness or fatigue.  HEENT:  Eyes:  No visual loss, blurred vision, double vision or yellow sclerae. Ears, Nose, Throat:  No hearing loss, sneezing, congestion, runny nose or sore throat.  SKIN:  No rash or itching.  CARDIOVASCULAR:  No chest pain, chest pressure or chest discomfort. No palpitations or edema.  RESPIRATORY:  No shortness of breath, cough or sputum.  GASTROINTESTINAL:  SEE HPI  GENITOURINARY:  No dysuria, hematuria, urinary frequency  NEUROLOGICAL:  No headache, dizziness, syncope, paralysis, ataxia, numbness or tingling in the extremities. No change in bowel or bladder control.  MUSCULOSKELETAL:  No muscle, back pain, joint pain or stiffness.  HEMATOLOGIC:  No anemia, bleeding or bruising.  LYMPHATICS:  No enlarged nodes. No history of splenectomy.  PSYCHIATRIC:  No history of depression or anxiety.  ENDOCRINOLOGIC:  No reports of sweating, cold or heat intolerance. No polyuria or polydipsia.      ______________________________________________________________________  PHYSICAL EXAM:  T(C): 37 (07-10-23 @ 09:13), Max: 37 (07-10-23 @ 09:13)  HR: 85 (07-10-23 @ 09:13)  BP: 110/65 (07-10-23 @ 09:13)  RR: 18 (07-10-23 @ 09:13)  SpO2: 98% (07-10-23 @ 09:13)  Wt(kg): --    07-09  -  07-10  --------------------------------------------------------  IN:    IV PiggyBack: 200 mL    Lactated Ringers: 750 mL  Total IN: 950 mL    OUT:  Total OUT: 0 mL    Total NET: 950 mL      07-10  -  07-10  --------------------------------------------------------  IN:  Total IN: 0 mL    OUT:    Oral Fluid: 0 mL  Total OUT: 0 mL    Total NET: 0 mL          GEN: NAD, normocephalic   CVS: S1S2+  CHEST: Speaking in full sentences, no signs of resp distress  ABD: soft , mildly distendend, non tender w/o rebound/guarding  EXTR: no cyanosis, no clubbing, no edema  NEURO: Awake and alert; oriented x3  SKIN:  warm;  non icteric    ______________________________________________________________________  LABS:                        7.5    5.56  )-----------( 285      ( 10 Jul 2023 06:04 )             25.1     07-10    143  |  108  |  19  ----------------------------<  98  4.0   |  23  |  1.15    Ca    9.0      10 Jul 2023 06:04  Phos  3.6     07-10  Mg     2.1     07-10    TPro  6.9  /  Alb  2.7<L>  /  TBili  0.4  /  DBili  x   /  AST  27  /  ALT  22  /  AlkPhos  88  07-09    LIVER FUNCTIONS - ( 09 Jul 2023 10:49 )  Alb: 2.7 g/dL / Pro: 6.9 g/dL / ALK PHOS: 88 U/L / ALT: 22 U/L / AST: 27 U/L / GGT: x             ____________________________________________    IMAGING:  < from: CT Abdomen and Pelvis w/ IV Cont (07.09.23 @ 14:09) >  FINDINGS:    LOWER CHEST: No visualized pleural effusion. Minimal dependent lung   scarring. Aortic valve and multivessel coronary artery calcifications.   Ectasia of the partially imaged mid ascending thoracic aorta measuring   3.8 cm.    LIVER: Liver size is borderline enlarged, 18 cm in craniocaudal   dimension. Subcentimeter hepatic hypodensities are too small to   characterize. Remaining portal vein and hepatic veins are patent.  BILE DUCTS: No distention  GALLBLADDER: Unremarkable CT appearance  SPLEEN: Spleen size within normal limits  PANCREAS: No acute peripancreatic inflammation  ADRENALS: Unremarkable  KIDNEYS/URETERS: Moderate left hydroureteronephrosis and ureteral   enhancement extending to the left hemipelvis adjacent to inflamed sigmoid   colon, further described below. Mild fullness of the right renal   collecting system without overt hydronephrosis. Mild right ureteral   enhancement as well. Bilateral renal collecting systems are partially   duplicated to the proximal ureters.    BLADDER: Incompletely assessed due to streak artifact from bilateral hip   prostheses. Pelvic inflammatory changes extend to the urinary bladder   dome. Urinary bladder does not contain air to suggest colovesicular   fistula, however is suboptimally assessed due to streak artifact.  REPRODUCTIVE ORGANS: Probable prior hysterectomy. Suboptimal evaluation   due to streak artifact. Soft tissue density in the mid pelvis extends up   to the inflamed colon, further described below.    BOWEL and PERITONEUM: Limited noncontrast evaluation. Small hiatal   hernia. Stomach is underdistended. Small duodenal diverticulum. No small   bowel distention. Large ventral hernia contains nonobstructed small and   large bowel. Mild stool burden of the colon limits evaluation of the   colonic mucosa. Extensive colonic diverticulosis with marked inflammatory   changes of acute on chronic sigmoid diverticulitis. Perisigmoid   collection containing air and small amount of fluid measures 3.7 x 1.2   cm, image 91 series 2, and is closely associated with the inflamed left   ureter. Additional dependent pelvic mahsa-rectosigmoid air/fluid measures   2.7 x 1.2 cm, near the vaginal cuff. Inflammatory changes extend to the   urinary bladder dome. Urinary bladder does not contain air, however   suboptimally assessed due to streak artifact.  PERITONEUM: See above 'bowel and peritoneum' section.  VESSELS: No abdominal aortic aneurysm. Aortoiliac an visceral artery   atheromatous changes.  RETROPERITONEUM/LYMPH NODES: Small volume nodes.  ABDOMINAL WALL: Large ventral midline hernia containing nonobstructed   small and large bowel.  BONES: Bilateral hip prostheses partially imaged, with surrounding streak   artifact limiting evaluation of the intrapelvic structures. Diffuse   osseous demineralization and multilevel degenerative changes of the   bones. Slightly scoliotic spinal curvature. Multilevel vertebral body   ankylosis, for example at T11-T12, L2-L3, and L3-L4 levels. Grade 1   anterolisthesis of L4 and L5. Central canal and neural foramina are not   adequately assessed on this study.    IMPRESSION:    Large ventral midline hernia containing nonobstructed small and large   bowel. Small hiatal hernia. No bowel obstruction.    Acute on chronic sigmoid diverticulitis with perisigmoid and rectosigmoid   air/fluid collections measuring 3.7 x 1.2 cm and 2.7 x 1.2 cm,   respectively. Adjacent inflammatory changes of the distal left ureter   associated with moderate left hydroureteronephrosis. Bilateral ureteral   enhancement may reflect urinary tract infection.    Pelvic inflammatory changes extend to the urinary bladder dome. Urinary   bladder does not contain air to suggest colovesicular fistula, however is   suboptimally assessed due to streak artifact.      < end of copied text >    ______________________________________________________________________              INIITIAL GI CONSULTATION    Patient is a 83y old  Female who presents with a chief complaint of AMS  HPI:  84yo w/ PMHx TIA on ASA, HTN, HLD, vascular dementia and ventral hernia who was reportedly brought in by EMS from assisted living after being found down at assisted living w/ episode of reported hematemesis. GI being consulted for hematemesis.    Patient unable to provide much history given dementia (stories differ throughout interview). Family at bedside and reporting assisted living noted she has seemed more lethargic and altered over past few days w/ abdominal pain and day of presentation had episode of vomiting. Denies any nausea now, any history of melena or hematochezia or fevers/chills. ASA use but denies any NSAIDs/smoking. Otherwise reports she feels well currently. Regularly has 2 BMs/day, last reportedly 2 days ago. Denies any urinary sxs. Per patient and family, no prior colonoscopy or endoscopy.     In ED, HD stable w/ /75, Hb 7.5 (unknown b/l), BUn/Cr 19/1.15. CT showed small hiatal hernia, moderate L hydrouretonephrosis, large ventral hernia containing small and large bowel w/o incarceration, acute on chronic diverticulitis w/ perisigmoid and rectosigmoid air/fluid collections w/ adjacent inflammatory changes in the distal L. ureter. Signs of ?UTI. Being followed by colorectal surgery who are recommending possible IR intervention for abscess drainage.        PMH/PSH:  PAST MEDICAL & SURGICAL HISTORY:  Breast cancer      HLD (hyperlipidemia)      Vascular dementia      History of TIAs        FH:  FAMILY HISTORY:      MEDS:  MEDICATIONS  (STANDING):  amLODIPine   Tablet 5 milliGRAM(s) Oral every 24 hours  aspirin  chewable 81 milliGRAM(s) Oral daily  atorvastatin 40 milliGRAM(s) Oral at bedtime  donepezil 5 milliGRAM(s) Oral at bedtime  enoxaparin Injectable 40 milliGRAM(s) SubCutaneous every 24 hours  ertapenem  IVPB      lactated ringers. 1000 milliLiter(s) (75 mL/Hr) IV Continuous <Continuous>  pantoprazole    Tablet 40 milliGRAM(s) Oral every 12 hours    MEDICATIONS  (PRN):    Allergies    No Known Allergies    Intolerances          ROS limited by dementia  CONSTITUTIONAL:  No weight loss, fever, chills, weakness or fatigue.  HEENT:  Eyes:  No visual loss, blurred vision, double vision or yellow sclerae. Ears, Nose, Throat:  No hearing loss, sneezing, congestion, runny nose or sore throat.  SKIN:  No rash or itching.  CARDIOVASCULAR:  No chest pain, chest pressure or chest discomfort. No palpitations or edema.  RESPIRATORY:  No shortness of breath, cough or sputum.  GASTROINTESTINAL:  SEE HPI  GENITOURINARY:  No dysuria, hematuria, urinary frequency  NEUROLOGICAL:  No headache, dizziness, syncope, paralysis, ataxia, numbness or tingling in the extremities. No change in bowel or bladder control.  MUSCULOSKELETAL:  No muscle, back pain, joint pain or stiffness.  HEMATOLOGIC:  No anemia, bleeding or bruising.  LYMPHATICS:  No enlarged nodes. No history of splenectomy.  PSYCHIATRIC:  No history of depression or anxiety.  ENDOCRINOLOGIC:  No reports of sweating, cold or heat intolerance. No polyuria or polydipsia.      ______________________________________________________________________  PHYSICAL EXAM:  T(C): 37 (07-10-23 @ 09:13), Max: 37 (07-10-23 @ 09:13)  HR: 85 (07-10-23 @ 09:13)  BP: 110/65 (07-10-23 @ 09:13)  RR: 18 (07-10-23 @ 09:13)  SpO2: 98% (07-10-23 @ 09:13)  Wt(kg): --    07-09  -  07-10  --------------------------------------------------------  IN:    IV PiggyBack: 200 mL    Lactated Ringers: 750 mL  Total IN: 950 mL    OUT:  Total OUT: 0 mL    Total NET: 950 mL      07-10  -  07-10  --------------------------------------------------------  IN:  Total IN: 0 mL    OUT:    Oral Fluid: 0 mL  Total OUT: 0 mL    Total NET: 0 mL          GEN: NAD, normocephalic   CVS: S1S2+  CHEST: Speaking in full sentences, no signs of resp distress  ABD: soft , mildly distendend, non tender w/o rebound/guarding  EXTR: no cyanosis, no clubbing, no edema  NEURO: Awake and alert; oriented x3  SKIN:  warm;  non icteric    ______________________________________________________________________  LABS:                        7.5    5.56  )-----------( 285      ( 10 Jul 2023 06:04 )             25.1     07-10    143  |  108  |  19  ----------------------------<  98  4.0   |  23  |  1.15    Ca    9.0      10 Jul 2023 06:04  Phos  3.6     07-10  Mg     2.1     07-10    TPro  6.9  /  Alb  2.7<L>  /  TBili  0.4  /  DBili  x   /  AST  27  /  ALT  22  /  AlkPhos  88  07-09    LIVER FUNCTIONS - ( 09 Jul 2023 10:49 )  Alb: 2.7 g/dL / Pro: 6.9 g/dL / ALK PHOS: 88 U/L / ALT: 22 U/L / AST: 27 U/L / GGT: x             ____________________________________________    IMAGING:  < from: CT Abdomen and Pelvis w/ IV Cont (07.09.23 @ 14:09) >  FINDINGS:    LOWER CHEST: No visualized pleural effusion. Minimal dependent lung   scarring. Aortic valve and multivessel coronary artery calcifications.   Ectasia of the partially imaged mid ascending thoracic aorta measuring   3.8 cm.    LIVER: Liver size is borderline enlarged, 18 cm in craniocaudal   dimension. Subcentimeter hepatic hypodensities are too small to   characterize. Remaining portal vein and hepatic veins are patent.  BILE DUCTS: No distention  GALLBLADDER: Unremarkable CT appearance  SPLEEN: Spleen size within normal limits  PANCREAS: No acute peripancreatic inflammation  ADRENALS: Unremarkable  KIDNEYS/URETERS: Moderate left hydroureteronephrosis and ureteral   enhancement extending to the left hemipelvis adjacent to inflamed sigmoid   colon, further described below. Mild fullness of the right renal   collecting system without overt hydronephrosis. Mild right ureteral   enhancement as well. Bilateral renal collecting systems are partially   duplicated to the proximal ureters.    BLADDER: Incompletely assessed due to streak artifact from bilateral hip   prostheses. Pelvic inflammatory changes extend to the urinary bladder   dome. Urinary bladder does not contain air to suggest colovesicular   fistula, however is suboptimally assessed due to streak artifact.  REPRODUCTIVE ORGANS: Probable prior hysterectomy. Suboptimal evaluation   due to streak artifact. Soft tissue density in the mid pelvis extends up   to the inflamed colon, further described below.    BOWEL and PERITONEUM: Limited noncontrast evaluation. Small hiatal   hernia. Stomach is underdistended. Small duodenal diverticulum. No small   bowel distention. Large ventral hernia contains nonobstructed small and   large bowel. Mild stool burden of the colon limits evaluation of the   colonic mucosa. Extensive colonic diverticulosis with marked inflammatory   changes of acute on chronic sigmoid diverticulitis. Perisigmoid   collection containing air and small amount of fluid measures 3.7 x 1.2   cm, image 91 series 2, and is closely associated with the inflamed left   ureter. Additional dependent pelvic mahsa-rectosigmoid air/fluid measures   2.7 x 1.2 cm, near the vaginal cuff. Inflammatory changes extend to the   urinary bladder dome. Urinary bladder does not contain air, however   suboptimally assessed due to streak artifact.  PERITONEUM: See above 'bowel and peritoneum' section.  VESSELS: No abdominal aortic aneurysm. Aortoiliac an visceral artery   atheromatous changes.  RETROPERITONEUM/LYMPH NODES: Small volume nodes.  ABDOMINAL WALL: Large ventral midline hernia containing nonobstructed   small and large bowel.  BONES: Bilateral hip prostheses partially imaged, with surrounding streak   artifact limiting evaluation of the intrapelvic structures. Diffuse   osseous demineralization and multilevel degenerative changes of the   bones. Slightly scoliotic spinal curvature. Multilevel vertebral body   ankylosis, for example at T11-T12, L2-L3, and L3-L4 levels. Grade 1   anterolisthesis of L4 and L5. Central canal and neural foramina are not   adequately assessed on this study.    IMPRESSION:    Large ventral midline hernia containing nonobstructed small and large   bowel. Small hiatal hernia. No bowel obstruction.    Acute on chronic sigmoid diverticulitis with perisigmoid and rectosigmoid   air/fluid collections measuring 3.7 x 1.2 cm and 2.7 x 1.2 cm,   respectively. Adjacent inflammatory changes of the distal left ureter   associated with moderate left hydroureteronephrosis. Bilateral ureteral   enhancement may reflect urinary tract infection.    Pelvic inflammatory changes extend to the urinary bladder dome. Urinary   bladder does not contain air to suggest colovesicular fistula, however is   suboptimally assessed due to streak artifact.      < end of copied text >    ______________________________________________________________________              INIITIAL GI CONSULTATION    Patient is a 83y old  Female who presents with a chief complaint of AMS  HPI:  84yo w/ PMHx TIA on ASA, HTN, HLD, vascular dementia and ventral hernia who was reportedly brought in by EMS from assisted living after being found down at assisted living w/ episode of reported coffee ground emesis. GI being consulted for coffee ground emesis.    Patient unable to provide much history given dementia (stories differ throughout interview). Family at bedside and reporting assisted living noted she has seemed more lethargic and altered over past few days w/ abdominal pain and day of presentation had episode of vomiting. Denies any nausea now, any history of melena or hematochezia or fevers/chills. ASA use but denies any NSAIDs/smoking. Otherwise reports she feels well currently. Regularly has 2 BMs/day, last reportedly 2 days ago. Denies any urinary sxs. Per patient and family, no prior colonoscopy or endoscopy.     In ED, HD stable w/ /75, Hb 7.5 (unknown b/l), BUn/Cr 19/1.15. CT showed small hiatal hernia, moderate L hydrouretonephrosis, large ventral hernia containing small and large bowel w/o incarceration, acute on chronic diverticulitis w/ perisigmoid and rectosigmoid air/fluid collections w/ adjacent inflammatory changes in the distal L. ureter. Signs of ?UTI. Being followed by colorectal surgery who are recommending possible IR intervention for abscess drainage.        PMH/PSH:  PAST MEDICAL & SURGICAL HISTORY:  Breast cancer      HLD (hyperlipidemia)      Vascular dementia      History of TIAs        FH:  FAMILY HISTORY:      MEDS:  MEDICATIONS  (STANDING):  amLODIPine   Tablet 5 milliGRAM(s) Oral every 24 hours  aspirin  chewable 81 milliGRAM(s) Oral daily  atorvastatin 40 milliGRAM(s) Oral at bedtime  donepezil 5 milliGRAM(s) Oral at bedtime  enoxaparin Injectable 40 milliGRAM(s) SubCutaneous every 24 hours  ertapenem  IVPB      lactated ringers. 1000 milliLiter(s) (75 mL/Hr) IV Continuous <Continuous>  pantoprazole    Tablet 40 milliGRAM(s) Oral every 12 hours    MEDICATIONS  (PRN):    Allergies    No Known Allergies    Intolerances          Unable to obtain ROS 2/2 dementia.      ______________________________________________________________________  PHYSICAL EXAM:  T(C): 37 (07-10-23 @ 09:13), Max: 37 (07-10-23 @ 09:13)  HR: 85 (07-10-23 @ 09:13)  BP: 110/65 (07-10-23 @ 09:13)  RR: 18 (07-10-23 @ 09:13)  SpO2: 98% (07-10-23 @ 09:13)  Wt(kg): --    07-09  -  07-10  --------------------------------------------------------  IN:    IV PiggyBack: 200 mL    Lactated Ringers: 750 mL  Total IN: 950 mL    OUT:  Total OUT: 0 mL    Total NET: 950 mL      07-10  -  07-10  --------------------------------------------------------  IN:  Total IN: 0 mL    OUT:    Oral Fluid: 0 mL  Total OUT: 0 mL    Total NET: 0 mL          GEN: NAD, normocephalic   CVS: S1S2+  CHEST: Speaking in full sentences, no signs of resp distress  ABD: soft , mildly distended, non tender w/o rebound/guarding  EXTR: no cyanosis, no clubbing, no edema  NEURO: Awake and alert; oriented x3  SKIN:  warm;  non icteric    ______________________________________________________________________  LABS:                        7.5    5.56  )-----------( 285      ( 10 Jul 2023 06:04 )             25.1     07-10    143  |  108  |  19  ----------------------------<  98  4.0   |  23  |  1.15    Ca    9.0      10 Jul 2023 06:04  Phos  3.6     07-10  Mg     2.1     07-10    TPro  6.9  /  Alb  2.7<L>  /  TBili  0.4  /  DBili  x   /  AST  27  /  ALT  22  /  AlkPhos  88  07-09    LIVER FUNCTIONS - ( 09 Jul 2023 10:49 )  Alb: 2.7 g/dL / Pro: 6.9 g/dL / ALK PHOS: 88 U/L / ALT: 22 U/L / AST: 27 U/L / GGT: x             ____________________________________________    IMAGING:  < from: CT Abdomen and Pelvis w/ IV Cont (07.09.23 @ 14:09) >  FINDINGS:    LOWER CHEST: No visualized pleural effusion. Minimal dependent lung   scarring. Aortic valve and multivessel coronary artery calcifications.   Ectasia of the partially imaged mid ascending thoracic aorta measuring   3.8 cm.    LIVER: Liver size is borderline enlarged, 18 cm in craniocaudal   dimension. Subcentimeter hepatic hypodensities are too small to   characterize. Remaining portal vein and hepatic veins are patent.  BILE DUCTS: No distention  GALLBLADDER: Unremarkable CT appearance  SPLEEN: Spleen size within normal limits  PANCREAS: No acute peripancreatic inflammation  ADRENALS: Unremarkable  KIDNEYS/URETERS: Moderate left hydroureteronephrosis and ureteral   enhancement extending to the left hemipelvis adjacent to inflamed sigmoid   colon, further described below. Mild fullness of the right renal   collecting system without overt hydronephrosis. Mild right ureteral   enhancement as well. Bilateral renal collecting systems are partially   duplicated to the proximal ureters.    BLADDER: Incompletely assessed due to streak artifact from bilateral hip   prostheses. Pelvic inflammatory changes extend to the urinary bladder   dome. Urinary bladder does not contain air to suggest colovesicular   fistula, however is suboptimally assessed due to streak artifact.  REPRODUCTIVE ORGANS: Probable prior hysterectomy. Suboptimal evaluation   due to streak artifact. Soft tissue density in the mid pelvis extends up   to the inflamed colon, further described below.    BOWEL and PERITONEUM: Limited noncontrast evaluation. Small hiatal   hernia. Stomach is underdistended. Small duodenal diverticulum. No small   bowel distention. Large ventral hernia contains nonobstructed small and   large bowel. Mild stool burden of the colon limits evaluation of the   colonic mucosa. Extensive colonic diverticulosis with marked inflammatory   changes of acute on chronic sigmoid diverticulitis. Perisigmoid   collection containing air and small amount of fluid measures 3.7 x 1.2   cm, image 91 series 2, and is closely associated with the inflamed left   ureter. Additional dependent pelvic mahsa-rectosigmoid air/fluid measures   2.7 x 1.2 cm, near the vaginal cuff. Inflammatory changes extend to the   urinary bladder dome. Urinary bladder does not contain air, however   suboptimally assessed due to streak artifact.  PERITONEUM: See above 'bowel and peritoneum' section.  VESSELS: No abdominal aortic aneurysm. Aortoiliac an visceral artery   atheromatous changes.  RETROPERITONEUM/LYMPH NODES: Small volume nodes.  ABDOMINAL WALL: Large ventral midline hernia containing nonobstructed   small and large bowel.  BONES: Bilateral hip prostheses partially imaged, with surrounding streak   artifact limiting evaluation of the intrapelvic structures. Diffuse   osseous demineralization and multilevel degenerative changes of the   bones. Slightly scoliotic spinal curvature. Multilevel vertebral body   ankylosis, for example at T11-T12, L2-L3, and L3-L4 levels. Grade 1   anterolisthesis of L4 and L5. Central canal and neural foramina are not   adequately assessed on this study.    IMPRESSION:    Large ventral midline hernia containing nonobstructed small and large   bowel. Small hiatal hernia. No bowel obstruction.    Acute on chronic sigmoid diverticulitis with perisigmoid and rectosigmoid   air/fluid collections measuring 3.7 x 1.2 cm and 2.7 x 1.2 cm,   respectively. Adjacent inflammatory changes of the distal left ureter   associated with moderate left hydroureteronephrosis. Bilateral ureteral   enhancement may reflect urinary tract infection.    Pelvic inflammatory changes extend to the urinary bladder dome. Urinary   bladder does not contain air to suggest colovesicular fistula, however is   suboptimally assessed due to streak artifact.      < end of copied text >    ______________________________________________________________________

## 2023-07-10 NOTE — PHYSICAL THERAPY INITIAL EVALUATION ADULT - PERTINENT HX OF CURRENT PROBLEM, REHAB EVAL
83F with hx of TIA on ASA, HTN, HLD, vascular dementia and ventral hernia who presents to hospital after being found down at assisted living facility with abdominal pain and possible hematemesis. CXR (-). 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 CT ABD/Pelvic, Large ventral hernia, small hiatal hernia. acute on chronic sigmoid diverticulitis

## 2023-07-10 NOTE — CONSULT NOTE ADULT - ASSESSMENT
HPI:  82yo w/ PMHx TIA on ASA, HTN, HLD, vascular dementia and ventral hernia admitted for AMS and abdominal pain/reports of hematemesis w/ imaging here showing acute on chronic diverticulitis, L. hydroureteronephrosis and air/fluid collections in perisgimoid and rectosigmoid spaces.     1. Hematemesis  Report of witnessed coffee ground emesis at assisted living. Unclear if this represented true hematemesis vs. vomitus (HD stable here w/o repeated episodes, BUN/Cr <30) though b/l Hb is stable. Only risk factor for UGIB is ASA use and ddx includes PUD vs. flako ga (if recurrent vomiting) vs. angioectasia. Given concern for perf as below would not perform EGD at this time.  -Maintain 2 large bore IVs  -Active T&S  -Clinical monitoring; if down-trending Hb or recurrent visualized hematemesis may consider EGD.    2. Air/fluid collections perisigmoid and rectosigmoid areas  Diverticulitis and Pelvic inflammatory changes w/ air/fluid collections in perisigmoid and rectosigmoid areas. Benign abdominal exam w/o leukocytosis or fever.    Note incomplete until finalized by attending signature/attestation.    Kami Buitrago  GI/Hepatology Fellow PGY5    NON-URGENT CONSULTS:  Please email [email or web address]  AT NIGHT AND ON WEEKENDS:  Available on Microsoft Teams  [phone] (Long Range Pager)    After 5pm, please contact the on-call GI fellow. [phone]     HPI:  82yo w/ PMHx TIA on ASA, HTN, HLD, vascular dementia and ventral hernia admitted for AMS and abdominal pain/reports of hematemesis w/ imaging here showing acute on chronic diverticulitis, L. hydroureteronephrosis and air/fluid collections in perisgimoid and rectosigmoid spaces.     1. Hematemesis  Report of witnessed coffee ground emesis at assisted living. Unclear if this represented true hematemesis vs. vomitus (HD stable here w/o repeated episodes, BUN/Cr <30) though b/l Hb is stable. Only risk factor for UGIB is ASA use and ddx includes PUD vs. flako ga (if recurrent vomiting) vs. angioectasia. Given concern for perf as below would not perform EGD at this time.  -Maintain 2 large bore IVs  -Active T&S  -Clinical monitoring; if down-trending Hb or recurrent visualized hematemesis may consider EGD.    2. Air/fluid collections perisigmoid and rectosigmoid areas  Diverticulitis and Pelvic inflammatory changes w/ air/fluid collections in perisigmoid and rectosigmoid areas. Benign abdominal exam w/o leukocytosis or fever.    Note incomplete until finalized by attending signature/attestation.    Kami Buitrago  GI/Hepatology Fellow PGY5    NON-URGENT CONSULTS:  Please email [email or web address]  AT NIGHT AND ON WEEKENDS:  Available on Microsoft Teams  [phone] (Long Range Pager)    After 5pm, please contact the on-call GI fellow. [phone]     HPI:  82yo w/ PMHx TIA on ASA, HTN, HLD, vascular dementia and ventral hernia admitted for AMS and abdominal pain/reports of hematemesis w/ imaging here showing acute on chronic diverticulitis, L. hydroureteronephrosis and air/fluid collections in perisgimoid and rectosigmoid spaces.     1. Hematemesis  Report of witnessed coffee ground emesis at assisted living. Unclear if this represented true hematemesis vs. vomitus (HD stable here w/o repeated episodes, BUN/Cr <30) though b/l Hb is stable. Only risk factor for UGIB is ASA use and ddx includes PUD vs. flako ga (if recurrent vomiting) vs. angioectasia. Given concern for perf as below would not perform EGD at this time.  -Maintain 2 large bore IVs  -Active T&S  -Clinical monitoring; if down-trending Hb or recurrent visualized hematemesis may consider EGD.    2. Air/fluid collections perisigmoid and rectosigmoid areas  Diverticulitis and Pelvic inflammatory changes w/ air/fluid collections in perisigmoid and rectosigmoid areas. Benign abdominal exam w/o leukocytosis or fever.    Note incomplete until finalized by attending signature/attestation.    Kami Buitrago  GI/Hepatology Fellow PGY5    NON-URGENT CONSULTS:  Please email [email or web address]  AT NIGHT AND ON WEEKENDS:  Available on Microsoft Teams  [phone] (Long Range Pager)    After 5pm, please contact the on-call GI fellow. [phone]     HPI:  84yo w/ PMHx TIA on ASA, HTN, HLD, vascular dementia and ventral hernia admitted for AMS and abdominal pain/reports of hematemesis w/ imaging here showing acute on chronic diverticulitis, L. hydroureteronephrosis and air/fluid collections in perisgimoid and rectosigmoid spaces.     1. Hematemesis  Report of witnessed coffee ground emesis at assisted living. Unclear if this represented true hematemesis vs. vomitus (HD stable here w/o repeated episodes, BUN/Cr <30) though b/l Hb is stable. Only risk factor for UGIB is ASA use and ddx includes PUD vs. flako ga (if recurrent vomiting) vs. angioectasia. Given concern for perf as below would not perform EGD at this time.  -Maintain 2 large bore IVs  -Active T&S  -IV PPI   -Clinical monitoring; if down-trending Hb or recurrent visualized hematemesis may consider EGD.    2. Air/fluid collections perisigmoid and rectosigmoid areas  Diverticulitis and Pelvic inflammatory changes w/ air/fluid collections in perisigmoid and rectosigmoid areas. Benign abdominal exam w/o leukocytosis or fever. DDx includes abscess. Less likely perforation in setting objective findings. Per colorectal note, recommending IR consult for possible drainage.   -Agree w/ IR involvement    Note incomplete until finalized by attending signature/attestation.    Kami Buitrago  GI/Hepatology Fellow PGY5    NON-URGENT CONSULTS:  Please email [email or web address]  AT NIGHT AND ON WEEKENDS:  Available on Microsoft Teams  [phone] (Long Range Pager)    After 5pm, please contact the on-call GI fellow. [phone]     HPI:  84yo w/ PMHx TIA on ASA, HTN, HLD, vascular dementia and ventral hernia admitted for AMS and abdominal pain/reports of hematemesis w/ imaging here showing acute on chronic diverticulitis, L. hydroureteronephrosis and air/fluid collections in perisgimoid and rectosigmoid spaces.     1. Hematemesis  Report of witnessed coffee ground emesis at assisted living. Unclear if this represented true hematemesis vs. vomitus (HD stable here w/o repeated episodes, BUN/Cr <30) though b/l Hb is stable. Only risk factor for UGIB is ASA use and ddx includes PUD vs. flako ga (if recurrent vomiting) vs. angioectasia. Given concern for perf as below would not perform EGD at this time.  -Maintain 2 large bore IVs  -Active T&S  -IV PPI   -Clinical monitoring; if down-trending Hb or recurrent visualized hematemesis may consider EGD.    2. Air/fluid collections perisigmoid and rectosigmoid areas  Diverticulitis and Pelvic inflammatory changes w/ air/fluid collections in perisigmoid and rectosigmoid areas. Benign abdominal exam w/o leukocytosis or fever. DDx includes abscess. Less likely perforation in setting objective findings. Per colorectal note, recommending IR consult for possible drainage.   -Agree w/ IR involvement    Note incomplete until finalized by attending signature/attestation.    Kmai Buitrago  GI/Hepatology Fellow PGY5    NON-URGENT CONSULTS:  Please email [email or web address]  AT NIGHT AND ON WEEKENDS:  Available on Microsoft Teams  [phone] (Long Range Pager)    After 5pm, please contact the on-call GI fellow. [phone]     HPI:  84yo w/ PMHx TIA on ASA, HTN, HLD, vascular dementia and ventral hernia admitted for AMS and abdominal pain/reports of hematemesis w/ imaging here showing acute on chronic diverticulitis, L. hydroureteronephrosis and air/fluid collections in perisgimoid and rectosigmoid spaces.     1. Hematemesis  Report of witnessed coffee ground emesis at assisted living. Unclear if this represented true hematemesis vs. vomitus (HD stable here w/o repeated episodes, BUN/Cr <30) though b/l Hb is stable. Only risk factor for UGIB is ASA use and ddx includes PUD vs. flako ga (if recurrent vomiting) vs. angioectasia. Given concern for perf as below would not perform EGD at this time.  -Maintain 2 large bore IVs  -Active T&S  -IV PPI   -Clinical monitoring; if down-trending Hb or recurrent visualized hematemesis may consider EGD.    2. Air/fluid collections perisigmoid and rectosigmoid areas  Diverticulitis and Pelvic inflammatory changes w/ air/fluid collections in perisigmoid and rectosigmoid areas. Benign abdominal exam w/o leukocytosis or fever. DDx includes abscess. Less likely perforation in setting objective findings. Per colorectal note, recommending IR consult for possible drainage.   -Agree w/ IR involvement    Note incomplete until finalized by attending signature/attestation.    Kami Buitrago  GI/Hepatology Fellow PGY5    NON-URGENT CONSULTS:  Please email [email or web address]  AT NIGHT AND ON WEEKENDS:  Available on Microsoft Teams  [phone] (Long Range Pager)    After 5pm, please contact the on-call GI fellow. [phone]     HPI:  84yo w/ PMHx TIA on ASA, HTN, HLD, vascular dementia and ventral hernia admitted for AMS and abdominal pain/reports of hematemesis w/ imaging here showing acute on chronic diverticulitis, L. hydroureteronephrosis and air/fluid collections in perisgimoid and rectosigmoid spaces. GI consulted for coffee ground emesis.    1. Coffee Ground Emesis  Report of witnessed coffee ground emesis at assisted living. Unclear if this represented true UGIB vs. vomitus (HD stable here w/o repeated episodes, BUN/Cr <30) though b/l Hb is unknown. Only risk factor for UGIB is ASA use and ddx includes PUD vs. flako ga (if recurrent vomiting) vs. angioectasia. Given risks of procedure and acute issue as below would not perform EGD at this time.  -Maintain 2 large bore IVs  -Active T&S  -IV PPI BID. BID PPI for 8 weeks then can transition to daily   -Clinical monitoring; if down-trending Hb or recurrent visualized hematemesis/coffee ground emesis may consider EGD.    2. Air/fluid collections perisigmoid and rectosigmoid areas  Diverticulitis and Pelvic inflammatory changes w/ air/fluid collections in perisigmoid and rectosigmoid areas. Benign abdominal exam w/o leukocytosis or fever. DDx includes abscess. Less likely perforation in setting objective findings. Per colorectal note, recommending IR consult for possible drainage.   -Agree w/ IR involvement  -Abx and rest of care per primary team    Note incomplete until finalized by attending signature/attestation.    Kami Buitrago  GI/Hepatology Fellow PGY5    NON-URGENT CONSULTS:  Please email [email or web address]  AT NIGHT AND ON WEEKENDS:  Available on Microsoft Teams  [phone] (Long Range Pager)    After 5pm, please contact the on-call GI fellow. [phone]     HPI:  84yo w/ PMHx TIA on ASA, HTN, HLD, vascular dementia and ventral hernia admitted for AMS and abdominal pain/reports of hematemesis w/ imaging here showing acute on chronic diverticulitis, L. hydroureteronephrosis and air/fluid collections in perisgimoid and rectosigmoid spaces. GI consulted for coffee ground emesis.    1. Coffee Ground Emesis  Report of witnessed coffee ground emesis at assisted living. Unclear if this represented true UGIB vs. vomitus (HD stable here w/o repeated episodes, BUN/Cr <30) though b/l Hb is unknown. Only risk factor for UGIB is ASA use and ddx includes PUD vs. flako ga (if recurrent vomiting) vs. angioectasia. Given risks of procedure and acute issue as below would not perform EGD at this time.  -Maintain 2 large bore IVs  -Active T&S  -IV PPI BID. BID PPI for 8 weeks then can transition to daily   -Clinical monitoring; if down-trending Hb or recurrent visualized hematemesis/coffee ground emesis may consider EGD.    2. Air/fluid collections perisigmoid and rectosigmoid areas  Diverticulitis and Pelvic inflammatory changes w/ air/fluid collections in perisigmoid and rectosigmoid areas. Benign abdominal exam w/o leukocytosis or fever. DDx includes abscess. Less likely perforation in setting objective findings. Per colorectal note, recommending IR consult for possible drainage.   -Agree w/ IR involvement  -Abx and rest of care per primary team    Note incomplete until finalized by attending signature/attestation.    Kami Buitrago  GI/Hepatology Fellow PGY5    NON-URGENT CONSULTS:  Please email [email or web address]  AT NIGHT AND ON WEEKENDS:  Available on Microsoft Teams  [phone] (Long Range Pager)    After 5pm, please contact the on-call GI fellow. [phone]     HPI:  82yo w/ PMHx TIA on ASA, HTN, HLD, vascular dementia and ventral hernia admitted for AMS and abdominal pain/reports of hematemesis w/ imaging here showing acute on chronic diverticulitis, L. hydroureteronephrosis and air/fluid collections in perisgimoid and rectosigmoid spaces. GI consulted for coffee ground emesis.    1. Coffee Ground Emesis  Report of witnessed coffee ground emesis at assisted living. Unclear if this represented true UGIB vs. vomitus (HD stable here w/o repeated episodes, BUN/Cr <30) though b/l Hb is unknown. Only risk factor for UGIB is ASA use and ddx includes PUD vs. flako ga (if recurrent vomiting) vs. angioectasia. Given risks of procedure and acute issue as below would not perform EGD at this time.  -Maintain 2 large bore IVs  -Active T&S  -IV PPI BID. BID PPI for 8 weeks then can transition to daily   -Clinical monitoring; if down-trending Hb or recurrent visualized hematemesis/coffee ground emesis may consider EGD.    2. Air/fluid collections perisigmoid and rectosigmoid areas  Diverticulitis and Pelvic inflammatory changes w/ air/fluid collections in perisigmoid and rectosigmoid areas. Benign abdominal exam w/o leukocytosis or fever. DDx includes abscess. Less likely perforation in setting objective findings. Per colorectal note, recommending IR consult for possible drainage.   -Agree w/ IR involvement  -Abx and rest of care per primary team    Note incomplete until finalized by attending signature/attestation.    Kami Buitrago  GI/Hepatology Fellow PGY5    NON-URGENT CONSULTS:  Please email [email or web address]  AT NIGHT AND ON WEEKENDS:  Available on [employer] Teams  [phone] (Long Range Pager)    After 5pm, please contact the on-call GI fellow. [phone]

## 2023-07-10 NOTE — PHYSICAL THERAPY INITIAL EVALUATION ADULT - ADDITIONAL COMMENTS
pt confused, as per son and daughter-in-law on b/s, resides in an [facility], [facility] unit, wheelchair bound at baseline pt confused, as per son and daughter-in-law on b/s, resides in an [facility], [facility] unit, wheelchair bound at baseline pt confused, as per son and daughter-in-law on b/s, resides in an [facility], [facility] unit, wheelchair bound at baseline

## 2023-07-10 NOTE — CONSULT NOTE ADULT - ATTENDING COMMENTS
82 yo F pmh TIA on asa, HTN, HL ,vascular dementia, ventral hernia admitted for ? coffee ground emesis at [facility] (she is unable to provide history) and incidentally found to have complicated diverticulitis c/b abscess.  GI consulted for ? CGE.  No more symptoms of bleeding and hgb stable.  Would opt for conservative management at this time give comorbids and no active bleeding Okay to c/w asa given vascular disease.  BID PPI IV.  Diet as tolerated.  Abscess management as per surgery.  No role for colonoscopy at this time. 82 yo F pmh TIA on asa, HTN, HL ,vascular dementia, ventral hernia admitted for ? coffee ground emesis at [facility] (she is unable to provide history) and incidentally found to have complicated diverticulitis c/b abscess.  GI consulted for ? CGE.  No more symptoms of bleeding and hgb stable.  Would opt for conservative management at this time give comorbids and no active bleeding Okay to c/w asa given vascular disease.  BID PPI IV.  Diet as tolerated.  Abscess management as per surgery.  No role for colonoscopy at this time. 84 yo F pmh TIA on asa, HTN, HL ,vascular dementia, ventral hernia admitted for ? coffee ground emesis at [facility] (she is unable to provide history) and incidentally found to have complicated diverticulitis c/b abscess.  GI consulted for ? CGE.  No more symptoms of bleeding and hgb stable.  Would opt for conservative management at this time give comorbids and no active bleeding Okay to c/w asa given vascular disease.  BID PPI IV.  Diet as tolerated.  Abscess management as per surgery.  No role for colonoscopy at this time.

## 2023-07-10 NOTE — PHYSICAL THERAPY INITIAL EVALUATION ADULT - IMPAIRMENTS FOUND, PT EVAL
HPI/History  Danny Fuller is a 44 y.o.  male accompanied by wife who presents for evaluation. Pt reports being ill for about 8 days. Positive for green nasal drainage and green productive cough and slightly sore throat. Chest sore from cough. Soft chills and sweats but no fevers. No other sxs or complaints. Thinks he is using mucinex DM. No known exposures. Pt and wife travelling tomorrow if weather holds.     Patient Active Problem List   Diagnosis Code    Obesity s/p gastric bypass Dr. Katie Mason 3/10 E66.9    Colon polyp 8/05 K63.5    Anxiety F41.9    Dyslipidemia E78.5    ADD (attention deficit disorder) F98.8     Past Medical History   Diagnosis Date    ADD  10/11    Allergic rhinitis     Asthma     Chronic pain      Back    Colon polyp     Depression     FHx: heart disease     GERD (gastroesophageal reflux disease)     Hepatic steatosis     HTN (hypertension)      resolved after wt loss    Obesity      Gastric bypass 5/10 Dr. Katie Mason BMI 54, peak weight 367; elvis 195 lbs    FRANCISCA on CPAP      resolved after wt loss    Stomach ulcer 2002     Dr. Juliana Garcias    Ulcerative proctitis ([facility] [address]     WRA-Back, Hips, Feet      Past Surgical History   Procedure Laterality Date    Endoscopy, colon, diagnostic       2002 negative, last 2005 proctitis Dr Donal Lara Hx bunionectomy       Dr Lawrence Mahan Biopsy liver       Dr Katie Mason 2010    Hx gastric bypass  3/10     Lap Cherri-en-Y BMI 47    Hx cholecystectomy  9/08     Dr Shravan Garcia Hx orthopaedic       left hip Dr Leeanna Schmidt age 14     Social History     Social History    Marital status:      Spouse name: N/A    Number of children: 2    Years of education: N/A     Occupational History    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      Social History Main Topics    Smoking status: Former Smoker    Smokeless tobacco: Current User    Alcohol use No    Drug use: No    Sexual activity: Not on file     Other Topics Concern    Not on file     Social History Narrative     Family History   Problem Relation Age of Onset    Cancer Maternal Grandmother      colon    Stroke Maternal Grandfather     Diabetes Paternal Grandfather     Heart Disease Paternal Grandfather     Cancer Paternal Grandfather      colon    Arthritis-osteo Mother     Heart Disease Mother     Depression Mother     Hypertension Father     Depression Father     Elevated Lipids Father      Current Outpatient Prescriptions   Medication Sig    dextroamphetamine-amphetamine (ADDERALL) 30 mg tablet Take 1 Tab by mouth two (2) times a day.  escitalopram oxalate (LEXAPRO) 20 mg tablet TAKE 1 TABLET BY MOUTH EVERY DAY    valACYclovir (VALTREX) 500 mg tablet Take 1 Tab by mouth two (2) times a day.  SUMAtriptan (IMITREX) 50 mg tablet Take 1 tab at onset of migraine. Can repeat 1 dose in 2 hrs. No more than 2 tabs in 24 hrs. No current facility-administered medications for this visit. No Active Allergies    Review of Systems  Significant findings included in HPI. Physical Examination  Visit Vitals    /80    Pulse 95    Temp 97.8 °F (36.6 °C) (Oral)    Ht 5' 8\" (1.727 m)    Wt 272 lb (123.4 kg)    SpO2 97%    BMI 41.36 kg/m2       General - Alert and in no acute distress. Pt appears relatively well, comfortable, and in good spirits. Nontoxic. Not anxious, non-diaphoretic. Mental status - Appropriate mood, behavior, speech content, dress, and thought processes. Eyes - Pupils equal and reactive, extraocular eye movements intact. No erythema or discharge. Ears - Auditory canals appear normal.  TMs appear normal.  Nose - Intermittent mild sniffles. Mild erythema and mucosal irritation. No rhinorrhea currently. Mouth - Mucous membranes moist. Pharynx without lesions, swelling, erythema, or exudate. Normal phonation. Neck - Supple without rigidity. Lymph - No periauricular, perimandibular, or ant/post cervical tenderness or swelling. Pulm - No significant cough.  Complete sentences. No tachypnea, retractions, or cyanosis. Good respiratory effort. Clear to auscultation bilat. No wheezes, rales, or rhonchi noted. Cardiovascular - Normal rate, regular rhythm. Assessment and Plan  1. Acute respiratory infection - Treat with supportive measures as discussed for now. Observe and little longer but start doxycycline if worsening or no improvement. Discussed course and prognosis. Pt and wife happily agree with plan. PLEASE NOTE:   This document has been produced using voice recognition software. Unrecognized errors in transcription may be present.     Alicia ZMP [email or web address] of [address] [place] [address]  [phone]  1/6/2017 aerobic capacity/endurance/fine motor/muscle strength

## 2023-07-11 ENCOUNTER — TRANSCRIPTION ENCOUNTER (OUTPATIENT)
Age: 83
End: 2023-07-11

## 2023-07-11 LAB
ANION GAP SERPL CALC-SCNC: 12 MMOL/L — SIGNIFICANT CHANGE UP (ref 5–17)
BUN SERPL-MCNC: 13 MG/DL — SIGNIFICANT CHANGE UP (ref 7–23)
CALCIUM SERPL-MCNC: 9.5 MG/DL — SIGNIFICANT CHANGE UP (ref 8.4–10.5)
CHLORIDE SERPL-SCNC: 107 MMOL/L — SIGNIFICANT CHANGE UP (ref 96–108)
CO2 SERPL-SCNC: 23 MMOL/L — SIGNIFICANT CHANGE UP (ref 22–31)
CREAT SERPL-MCNC: 1.11 MG/DL — SIGNIFICANT CHANGE UP (ref 0.5–1.3)
EGFR: 49 ML/MIN/1.73M2 — LOW
GLUCOSE SERPL-MCNC: 93 MG/DL — SIGNIFICANT CHANGE UP (ref 70–99)
HCT VFR BLD CALC: 27.1 % — LOW (ref 34.5–45)
HGB BLD-MCNC: 7.9 G/DL — LOW (ref 11.5–15.5)
MAGNESIUM SERPL-MCNC: 2.1 MG/DL — SIGNIFICANT CHANGE UP (ref 1.6–2.6)
MCHC RBC-ENTMCNC: 27.9 PG — SIGNIFICANT CHANGE UP (ref 27–34)
MCHC RBC-ENTMCNC: 29.2 GM/DL — LOW (ref 32–36)
MCV RBC AUTO: 95.8 FL — SIGNIFICANT CHANGE UP (ref 80–100)
NRBC # BLD: 0 /100 WBCS — SIGNIFICANT CHANGE UP (ref 0–0)
PHOSPHATE SERPL-MCNC: 3.4 MG/DL — SIGNIFICANT CHANGE UP (ref 2.5–4.5)
PLATELET # BLD AUTO: 340 K/UL — SIGNIFICANT CHANGE UP (ref 150–400)
POTASSIUM SERPL-MCNC: 4.3 MMOL/L — SIGNIFICANT CHANGE UP (ref 3.5–5.3)
POTASSIUM SERPL-SCNC: 4.3 MMOL/L — SIGNIFICANT CHANGE UP (ref 3.5–5.3)
RBC # BLD: 2.83 M/UL — LOW (ref 3.8–5.2)
RBC # FLD: 15.7 % — HIGH (ref 10.3–14.5)
SODIUM SERPL-SCNC: 142 MMOL/L — SIGNIFICANT CHANGE UP (ref 135–145)
WBC # BLD: 6.56 K/UL — SIGNIFICANT CHANGE UP (ref 3.8–10.5)
WBC # FLD AUTO: 6.56 K/UL — SIGNIFICANT CHANGE UP (ref 3.8–10.5)

## 2023-07-11 PROCEDURE — 99231 SBSQ HOSP IP/OBS SF/LOW 25: CPT | Mod: GC

## 2023-07-11 PROCEDURE — 71045 X-RAY EXAM CHEST 1 VIEW: CPT | Mod: 26

## 2023-07-11 RX ORDER — SODIUM CHLORIDE 9 MG/ML
10 INJECTION INTRAMUSCULAR; INTRAVENOUS; SUBCUTANEOUS
Refills: 0 | Status: DISCONTINUED | OUTPATIENT
Start: 2023-07-11 | End: 2023-07-13

## 2023-07-11 RX ORDER — CHLORHEXIDINE GLUCONATE 213 G/1000ML
1 SOLUTION TOPICAL
Refills: 0 | Status: DISCONTINUED | OUTPATIENT
Start: 2023-07-11 | End: 2023-07-13

## 2023-07-11 RX ADMIN — DONEPEZIL HYDROCHLORIDE 5 MILLIGRAM(S): 10 TABLET, FILM COATED ORAL at 21:14

## 2023-07-11 RX ADMIN — ATORVASTATIN CALCIUM 40 MILLIGRAM(S): 80 TABLET, FILM COATED ORAL at 21:14

## 2023-07-11 RX ADMIN — ENOXAPARIN SODIUM 40 MILLIGRAM(S): 100 INJECTION SUBCUTANEOUS at 21:13

## 2023-07-11 RX ADMIN — PANTOPRAZOLE SODIUM 40 MILLIGRAM(S): 20 TABLET, DELAYED RELEASE ORAL at 17:39

## 2023-07-11 RX ADMIN — AMLODIPINE BESYLATE 5 MILLIGRAM(S): 2.5 TABLET ORAL at 21:14

## 2023-07-11 RX ADMIN — ERTAPENEM SODIUM 120 MILLIGRAM(S): 1 INJECTION, POWDER, LYOPHILIZED, FOR SOLUTION INTRAMUSCULAR; INTRAVENOUS at 07:36

## 2023-07-11 RX ADMIN — PANTOPRAZOLE SODIUM 40 MILLIGRAM(S): 20 TABLET, DELAYED RELEASE ORAL at 05:31

## 2023-07-11 RX ADMIN — Medication 81 MILLIGRAM(S): at 11:13

## 2023-07-11 NOTE — DISCHARGE NOTE PROVIDER - NSDCCPCAREPLAN_GEN_ALL_CORE_FT
PRINCIPAL DISCHARGE DIAGNOSIS  Diagnosis: Diverticulitis of intestine with perforation  Assessment and Plan of Treatment: DIET: Low fiber diet  NOTIFY YOUR SURGEON IF: You have any fever (over 100.4 F) or chills, persistent nausea/vomiting with inability to tolerate food or liquids, persistent diarrhea, or if your pain is not controlled   FOLLOW-UP:  1. Please call to make a follow-up appointment within one week of discharge   2. Please follow up with your primary care physician in one week regarding your hospitalization.        SECONDARY DISCHARGE DIAGNOSES  Diagnosis: Umbilical hernia  Assessment and Plan of Treatment:      PRINCIPAL DISCHARGE DIAGNOSIS  Diagnosis: Diverticulitis of intestine with perforation  Assessment and Plan of Treatment: DIET: Low fiber diet  NOTIFY YOUR SURGEON IF: You have any fever (over 100.4 F) or chills, persistent nausea/vomiting with inability to tolerate food or liquids, persistent diarrhea, or if your pain is not controlled   ANTIBIOTICS: Continue IV Ertapenem for total 2 weeks (end date: 7/24)  FOLLOW-UP:  1. Please call to make a follow-up appointment within one week of discharge   2. Please follow up with your primary care physician in one week regarding your hospitalization.        SECONDARY DISCHARGE DIAGNOSES  Diagnosis: Umbilical hernia  Assessment and Plan of Treatment:

## 2023-07-11 NOTE — DISCHARGE NOTE PROVIDER - CARE PROVIDER_API CALL
Veto Tavarez  Colon/Rectal Surgery  [address] [place] [address], [address]  [place] [address]  Phone: [phone]  Fax: [phone]  Follow Up Time: 2 weeks   Veto Tavarez  Colon/Rectal Surgery  [address] [place] [address], [address]  [place] [address]  Phone: [phone]  Fax: [phone]  Follow Up Time: 2 weeks   Veto Tavarez  Colon/Rectal Surgery  [address] [place] [address], [address]  [place] [address]  Phone: [phone]  Fax: [phone]  Follow Up Time: 2 weeks

## 2023-07-11 NOTE — PROGRESS NOTE ADULT - ASSESSMENT
84yo w/ PMHx TIA on ASA, HTN, HLD, vascular dementia and ventral hernia admitted for AMS and abdominal pain/reports of CGE w/ imaging here showing acute on chronic diverticulitis, L. hydroureteronephrosis and air/fluid collections in perisgimoid and rectosigmoid spaces c/f abscess. GI consulted for coffee ground emesis.    1. Coffee Ground Emesis  Report of witnessed coffee ground emesis at assisted living. Unclear if this represented true UGIB vs. vomitus (HD stable here w/o repeated episodes, BUN/Cr <30) though given no active bleeding and other ongoing medical issues, would not pursue EGD at this time. Remains HD stable w/ stable H/H.   -C/w conservative management given no active bleeding  -c/w PPI BID for 8 weeks; then transition to daily   -no role for endoscopy at this time  -will sign off at this time, please call back with questions or if new issues arise    Note incomplete until finalized by attending signature/attestation.    Kami Buitrago  GI/Hepatology Fellow PGY5    NON-URGENT CONSULTS:  Please email [email or web address]  AT NIGHT AND ON WEEKENDS:  Available on [employer] Teams  [phone] (Long Range Pager)    After 5pm, please contact the on-call GI fellow. [phone] 82yo w/ PMHx TIA on ASA, HTN, HLD, vascular dementia and ventral hernia admitted for AMS and abdominal pain/reports of CGE w/ imaging here showing acute on chronic diverticulitis, L. hydroureteronephrosis and air/fluid collections in perisgimoid and rectosigmoid spaces c/f abscess. GI consulted for coffee ground emesis.    1. Coffee Ground Emesis  Report of witnessed coffee ground emesis at assisted living. Unclear if this represented true UGIB vs. vomitus (HD stable here w/o repeated episodes, BUN/Cr <30) though given no active bleeding and other ongoing medical issues, would not pursue EGD at this time. Remains HD stable w/ stable H/H.   -C/w conservative management given no active bleeding  -c/w PPI BID for 8 weeks; then transition to daily   -no role for endoscopy at this time  -will sign off at this time, please call back with questions or if new issues arise    Note incomplete until finalized by attending signature/attestation.    Kami Buitrago  GI/Hepatology Fellow PGY5    NON-URGENT CONSULTS:  Please email [email or web address]  AT NIGHT AND ON WEEKENDS:  Available on Microsoft Teams  [phone] (Long Range Pager)    After 5pm, please contact the on-call GI fellow. [phone] 82yo w/ PMHx TIA on ASA, HTN, HLD, vascular dementia and ventral hernia admitted for AMS and abdominal pain/reports of CGE w/ imaging here showing acute on chronic diverticulitis, L. hydroureteronephrosis and air/fluid collections in perisgimoid and rectosigmoid spaces c/f abscess. GI consulted for coffee ground emesis.    1. Coffee Ground Emesis  Report of witnessed coffee ground emesis at assisted living. Unclear if this represented true UGIB vs. vomitus (HD stable here w/o repeated episodes, BUN/Cr <30) though given no active bleeding and other ongoing medical issues, would not pursue EGD at this time. Remains HD stable w/ stable H/H.   -C/w conservative management given no active bleeding  -c/w PPI BID for 8 weeks; then transition to daily   -no role for endoscopy at this time  -will sign off at this time, please call back with questions or if new issues arise    Note incomplete until finalized by attending signature/attestation.    Kami Buitrago  GI/Hepatology Fellow PGY5    NON-URGENT CONSULTS:  Please email [email or web address]  AT NIGHT AND ON WEEKENDS:  Available on [employer] Teams  [phone] (Long Range Pager)    After 5pm, please contact the on-call GI fellow. [phone]

## 2023-07-11 NOTE — DISCHARGE NOTE PROVIDER - NSDCMRMEDTOKEN_GEN_ALL_CORE_FT
anastrozole 1 mg oral tablet: 1 orally  Aricept 5 mg oral tablet: 1 orally  aspirin 81 mg oral capsule: 1 orally  atorvastatin 40 mg oral tablet: 1 orally  Cipro 500 mg oral tablet: 1 orally  Norvasc 5 mg oral tablet: 1 orally   anastrozole 1 mg oral tablet: 1 tab(s) orally once a day  Aricept 5 mg oral tablet: 1 tab(s) orally once a day  aspirin 81 mg oral capsule: 1 tab(s) orally once a day  atorvastatin 40 mg oral tablet: 1 tab(s) orally once a day  enoxaparin: 40 milligram(s) subcutaneous once a day  ertapenem 1 g injection: 1 gram(s) intravenous every 24 hours in 50mL of NS, infused over 30 minutes. LAST DOSE: 7/24/23  Norvasc 5 mg oral tablet: 1 tab(s) orally once a day  pantoprazole 40 mg oral delayed release tablet: 1 tab(s) orally every 12 hours

## 2023-07-11 NOTE — PROGRESS NOTE ADULT - SUBJECTIVE AND OBJECTIVE BOX
Interval Events:   -Feeling well this morning, no concerns  -Plan for PICC today   -[facility] Medications:  amLODIPine   Tablet 5 milliGRAM(s) Oral every 24 hours  aspirin  chewable 81 milliGRAM(s) Oral daily  atorvastatin 40 milliGRAM(s) Oral at bedtime  donepezil 5 milliGRAM(s) Oral at bedtime  enoxaparin Injectable 40 milliGRAM(s) SubCutaneous every 24 hours  ertapenem  IVPB      ertapenem  IVPB 1000 milliGRAM(s) IV Intermittent every 24 hours  pantoprazole    Tablet 40 milliGRAM(s) Oral every 12 hours      ROS: All system reviewed and negative except as mentioned above.    PHYSICAL EXAM:   Vital Signs:  Vital Signs Last 24 Hrs  T(C): 36.6 (11 Jul 2023 09:49), Max: 37.1 (10 Jul 2023 20:40)  T(F): 97.8 (11 Jul 2023 09:49), Max: 98.7 (10 Jul 2023 20:40)  HR: 90 (11 Jul 2023 09:49) (80 - 90)  BP: 125/73 (11 Jul 2023 09:49) (110/60 - 135/94)  BP(mean): --  RR: 18 (11 Jul 2023 09:49) (18 - 18)  SpO2: 98% (11 Jul 2023 09:49) (93% - 98%)    Parameters below as of 11 Jul 2023 09:49  Patient On (Oxygen Delivery Method): room air      Daily     Daily     GENERAL:  NAD, Appears stated age  HEENT:  NC/AT,  conjunctivae clear and pink, sclera -anicteric  CHEST:  Normal Effort, Breath sounds clear  HEART:  RRR, S1 + S2, no murmurs  ABDOMEN:  Soft, non-tender, non-distended, normoactive bowel sounds,  no masses  EXTREMITIES:  no cyanosis or edema  SKIN:  Warm & Dry. No rash or erythema  NEURO:  Baseline dementia, confused at times     LABS:                        7.9    6.56  )-----------( 340      ( 11 Jul 2023 08:47 )             27.1     Mean Cell Volume: 95.8 fl (07-11-23 @ 08:47)    07-11    142  |  107  |  13  ----------------------------<  93  4.3   |  23  |  1.11    Ca    9.5      11 Jul 2023 08:47  Phos  3.4     07-11  Mg     2.1     07-11          Urinalysis Basic - ( 11 Jul 2023 08:47 )    Color: x / Appearance: x / SG: x / pH: x  Gluc: 93 mg/dL / Ketone: x  / Bili: x / Urobili: x   Blood: x / Protein: x / Nitrite: x   Leuk Esterase: x / RBC: x / WBC x   Sq Epi: x / Non Sq Epi: x / Bacteria: x                              7.9    6.56  )-----------( 340      ( 11 Jul 2023 08:47 )             27.1                         7.5    5.56  )-----------( 285      ( 10 Jul 2023 06:04 )             25.1                         7.9    6.56  )-----------( 285      ( 09 Jul 2023 10:49 )             26.5       Imaging: Images reviewed.     Interval Events:   -Feeling well this morning, no concerns  -Plan for PICC today   -[facility] Medications:  amLODIPine   Tablet 5 milliGRAM(s) Oral every 24 hours  aspirin  chewable 81 milliGRAM(s) Oral daily  atorvastatin 40 milliGRAM(s) Oral at bedtime  donepezil 5 milliGRAM(s) Oral at bedtime  enoxaparin Injectable 40 milliGRAM(s) SubCutaneous every 24 hours  ertapenem  IVPB      ertapenem  IVPB 1000 milliGRAM(s) IV Intermittent every 24 hours  pantoprazole    Tablet 40 milliGRAM(s) Oral every 12 hours      ROS: All system reviewed and negative except as mentioned above.    PHYSICAL EXAM:   Vital Signs:  Vital Signs Last 24 Hrs  T(C): 36.6 (11 Jul 2023 09:49), Max: 37.1 (10 Jul 2023 20:40)  T(F): 97.8 (11 Jul 2023 09:49), Max: 98.7 (10 Jul 2023 20:40)  HR: 90 (11 Jul 2023 09:49) (80 - 90)  BP: 125/73 (11 Jul 2023 09:49) (110/60 - 135/94)  BP(mean): --  RR: 18 (11 Jul 2023 09:49) (18 - 18)  SpO2: 98% (11 Jul 2023 09:49) (93% - 98%)    Parameters below as of 11 Jul 2023 09:49  Patient On (Oxygen Delivery Method): room air      Daily     Daily     GENERAL:  NAD, Appears stated age  HEENT:  NC/AT,  conjunctivae clear and pink, sclera -anicteric  CHEST:  Normal Effort, Breath sounds clear  HEART:  RRR, S1 + S2, no murmurs  ABDOMEN:  Soft, non-tender, non-distended, normoactive bowel sounds,  no masses  EXTREMITIES:  no cyanosis or edema  SKIN:  Warm & Dry. No rash or erythema  NEURO:  Baseline dementia, confused at times     LABS:                        7.9    6.56  )-----------( 340      ( 11 Jul 2023 08:47 )             27.1     Mean Cell Volume: 95.8 fl (07-11-23 @ 08:47)    07-11    142  |  107  |  13  ----------------------------<  93  4.3   |  23  |  1.11    Ca    9.5      11 Jul 2023 08:47  Phos  3.4     07-11  Mg     2.1     07-11          Urinalysis Basic - ( 11 Jul 2023 08:47 )    Color: x / Appearance: x / SG: x / pH: x  Gluc: 93 mg/dL / Ketone: x  / Bili: x / Urobili: x   Blood: x / Protein: x / Nitrite: x   Leuk Esterase: x / RBC: x / WBC x   Sq Epi: x / Non Sq Epi: x / Bacteria: x                              7.9    6.56  )-----------( 340      ( 11 Jul 2023 08:47 )             27.1                         7.5    5.56  )-----------( 285      ( 10 Jul 2023 06:04 )             25.1                         7.9    6.56  )-----------( 285      ( 09 Jul 2023 10:49 )             26.5       Imaging: Images reviewed.     Interval Events:   -Feeling well this morning, no concerns  -Plan for PICC today   -[facility][1] Medications:  amLODIPine   Tablet 5 milliGRAM(s) Oral every 24 hours  aspirin  chewable 81 milliGRAM(s) Oral daily  atorvastatin 40 milliGRAM(s) Oral at bedtime  donepezil 5 milliGRAM(s) Oral at bedtime  enoxaparin Injectable 40 milliGRAM(s) SubCutaneous every 24 hours  ertapenem  IVPB      ertapenem  IVPB 1000 milliGRAM(s) IV Intermittent every 24 hours  pantoprazole    Tablet 40 milliGRAM(s) Oral every 12 hours      ROS: All system reviewed and negative except as mentioned above.    PHYSICAL EXAM:   Vital Signs:  Vital Signs Last 24 Hrs  T(C): 36.6 (11 Jul 2023 09:49), Max: 37.1 (10 Jul 2023 20:40)  T(F): 97.8 (11 Jul 2023 09:49), Max: 98.7 (10 Jul 2023 20:40)  HR: 90 (11 Jul 2023 09:49) (80 - 90)  BP: 125/73 (11 Jul 2023 09:49) (110/60 - 135/94)  BP(mean): --  RR: 18 (11 Jul 2023 09:49) (18 - 18)  SpO2: 98% (11 Jul 2023 09:49) (93% - 98%)    Parameters below as of 11 Jul 2023 09:49  Patient On (Oxygen Delivery Method): room air      Daily     Daily     GENERAL:  NAD, Appears stated age  HEENT:  NC/AT,  conjunctivae clear and pink, sclera -anicteric  CHEST:  Normal Effort, Breath sounds clear  HEART:  RRR, S1 + S2, no murmurs  ABDOMEN:  Soft, non-tender, non-distended, normoactive bowel sounds,  no masses  EXTREMITIES:  no cyanosis or edema  SKIN:  Warm & Dry. No rash or erythema  NEURO:  Baseline dementia, confused at times     LABS:                        7.9    6.56  )-----------( 340      ( 11 Jul 2023 08:47 )             27.1     Mean Cell Volume: 95.8 fl (07-11-23 @ 08:47)    07-11    142  |  107  |  13  ----------------------------<  93  4.3   |  23  |  1.11    Ca    9.5      11 Jul 2023 08:47  Phos  3.4     07-11  Mg     2.1     07-11          Urinalysis Basic - ( 11 Jul 2023 08:47 )    Color: x / Appearance: x / SG: x / pH: x  Gluc: 93 mg/dL / Ketone: x  / Bili: x / Urobili: x   Blood: x / Protein: x / Nitrite: x   Leuk Esterase: x / RBC: x / WBC x   Sq Epi: x / Non Sq Epi: x / Bacteria: x                              7.9    6.56  )-----------( 340      ( 11 Jul 2023 08:47 )             27.1                         7.5    5.56  )-----------( 285      ( 10 Jul 2023 06:04 )             25.1                         7.9    6.56  )-----------( 285      ( 09 Jul 2023 10:49 )             26.5       Imaging: Images reviewed.

## 2023-07-11 NOTE — PROGRESS NOTE ADULT - ASSESSMENT
83F with hx of TIA on ASA, HTN, HLD, vascular dementia and ventral hernia. Limited HPI information as patient has dementia. CT scan in the ED 07/09 concerning for acute on chronic diverticulitis with two small mahsa-sigmoid collections concerning for perforation. Collections 3.7x1.2cm and 2.7x1.2cm. No overnight events, vitals in normal parameters, pt passing has and had one non-bloody bowel movement. FOBT result negative.     Plan:   - Possible EGD with GI today  - OOB with assistance PT  - NPO with s/c  - Enoxaparin (Lovenox) 40mg SC QD  - Ertapenem 1g stop after 7 days  - PPI: Pantoprazole 40mg BID  - IVF D5 + NS 0.45%  - Continue Home meds  - Dispo:    [facility]  [id number] 83F with hx of TIA on ASA, HTN, HLD, vascular dementia and ventral hernia. Limited HPI information as patient has dementia. CT scan in the ED 07/09 concerning for acute on chronic diverticulitis with two small mahsa-sigmoid collections concerning for perforation. Collections 3.7x1.2cm and 2.7x1.2cm. No overnight events, vitals in normal parameters, pt passing has and had one non-bloody bowel movement. FOBT result negative.     Plan:   - Possible EGD with GI today  - OOB with assistance PT  - NPO with s/c  - Enoxaparin (Lovenox) 40mg SC QD  - Ertapenem 1g stop after 7 days  - PPI: Pantoprazole 40mg BID  - IVF D5 + NS 0.45%  - Continue Home meds  - Dispo:    [facility]  [id number] 83F with hx of TIA on ASA, HTN, HLD, vascular dementia and ventral hernia. Limited HPI information as patient has dementia. CT scan in the ED 07/09 concerning for acute on chronic diverticulitis with two small mahsa-sigmoid collections concerning for perforation. Collections 3.7x1.2cm and 2.7x1.2cm. No overnight events, vitals in normal parameters, pt passing has and had one non-bloody bowel movement. FOBT result negative.     Plan:   - Possible EGD with GI today  - OOB with assistance PT  - NPO with s/c  - Enoxaparin (Lovenox) 40mg SC QD  - Ertapenem 1g stop after 7 days  - PPI: Pantoprazole 40mg BID  - IVF D5 + NS 0.45%  - Continue Home meds  - Dispo:    [facility]  [id number] 83F with hx of TIA on ASA, HTN, HLD, vascular dementia and ventral hernia. Limited HPI information as patient has dementia. CT scan in the ED 07/09 concerning for acute on chronic diverticulitis with two small mahsa-sigmoid collections concerning for perforation. Collections 3.7x1.2cm and 2.7x1.2cm. No overnight events, vitals in normal parameters, pt passing has and had one non-bloody bowel movement. FOBT result negative.     Plan:   - OOB with assistance  - clears  - PICC placement today  - Enoxaparin (Lovenox) 40mg SC QD  - Ertapenem 1g QD  - PPI: Pantoprazole 40mg BID  - Continue Home meds  - D/c planning     [facility]  [id number] 83F with hx of TIA on ASA, HTN, HLD, vascular dementia and ventral hernia. Limited HPI information as patient has dementia. CT scan in the ED 07/09 concerning for acute on chronic diverticulitis with two small mahsa-sigmoid collections concerning for perforation. Collections 3.7x1.2cm and 2.7x1.2cm. No overnight events, vitals in normal parameters, pt passing has and had one non-bloody bowel movement. FOBT result negative.     Plan:   - OOB with assistance  - clears  - PICC placement today  - Enoxaparin (Lovenox) 40mg SC QD  - Ertapenem 1g QD  - PPI: Pantoprazole 40mg BID  - Continue Home meds  - D/c planning     [facility]  [id number] 83F with hx of TIA on ASA, HTN, HLD, vascular dementia and ventral hernia. Limited HPI information as patient has dementia. CT scan in the ED 07/09 concerning for acute on chronic diverticulitis with two small mahsa-sigmoid collections concerning for perforation. Collections 3.7x1.2cm and 2.7x1.2cm. No overnight events, vitals in normal parameters, pt passing has and had one non-bloody bowel movement. FOBT result negative.     Plan:   - OOB with assistance  - clears  - PICC placement today  - Enoxaparin (Lovenox) 40mg SC QD  - Ertapenem 1g QD  - PPI: Pantoprazole 40mg BID  - Continue Home meds  - D/c planning     [facility]  [id number]

## 2023-07-11 NOTE — PROGRESS NOTE ADULT - SUBJECTIVE AND OBJECTIVE BOX
[facility] Daily Resident Progress Note    Reason for admission: Acute on chronic sigmoid diverticulitis with perisigmoid and rectosigmoid air/fluid collections 07/09/23    SUBJECTIVE: Pt seen and examined at bedside. Refers no abdominal pain, passing gas, mild abdominal distention, no N/V, no SOB, refers no bowel movements.     Overnight events: Vitals in normal parameters, no overall pain, patient still NPO, had 1 bowel movement and is passing gas.    OBJECTIVE:  Vital Signs Last 24 Hrs  T(C): 36.6 (11 Jul 2023 00:47), Max: 37.1 (10 Jul 2023 20:40)  T(F): 97.8 (11 Jul 2023 00:47), Max: 98.7 (10 Jul 2023 20:40)  HR: 83 (11 Jul 2023 00:47) (80 - 88)  BP: 124/75 (11 Jul 2023 00:47) (109/61 - 135/94)  BP(mean): --  RR: 18 (11 Jul 2023 00:47) (18 - 18)  SpO2: 96% (11 Jul 2023 00:47) (95% - 98%)    Parameters below as of 11 Jul 2023 00:47  Patient On (Oxygen Delivery Method): room air        I&O's Summary    09 Jul 2023 07:01  -  10 Jul 2023 07:00  --------------------------------------------------------  IN: 950 mL / OUT: 0 mL / NET: 950 mL    10 Jul 2023 07:01  -  11 Jul 2023 02:49  --------------------------------------------------------  IN: 950 mL / OUT: 850 mL / NET: 100 mL        Allergies:  No Known Allergies      Medications:  MEDICATIONS  (STANDING):  amLODIPine   Tablet 5 milliGRAM(s) Oral every 24 hours  aspirin  chewable 81 milliGRAM(s) Oral daily  atorvastatin 40 milliGRAM(s) Oral at bedtime  dextrose 5% + sodium chloride 0.45% with potassium chloride 20 mEq/L 1000 milliLiter(s) (75 mL/Hr) IV Continuous <Continuous>  donepezil 5 milliGRAM(s) Oral at bedtime  enoxaparin Injectable 40 milliGRAM(s) SubCutaneous every 24 hours  ertapenem  IVPB      ertapenem  IVPB 1000 milliGRAM(s) IV Intermittent every 24 hours  pantoprazole    Tablet 40 milliGRAM(s) Oral every 12 hours    MEDICATIONS  (PRN):      Physical Exam:  General Appearance: Appears well, NAD, A&Ox3  Chest: Equal expansion bilaterally, no increased WOB.   CV: RRR, WWP.   Abdomen: soft, obese, large midline hernia, no signs of obstruction, audible bowel sounds, no rigidity or guarding.  Extremities: No swelling, asymmetry. Articular deformities in hands and feet appreciated.      Labs:                        7.5    5.56  )-----------( 285      ( 10 Jul 2023 06:04 )             25.1     07-10    143  |  108  |  19  ----------------------------<  98  4.0   |  23  |  1.15    Ca    9.0      10 Jul 2023 06:04  Phos  3.6     07-10  Mg     2.1     07-10    TPro  6.9  /  Alb  2.7<L>  /  TBili  0.4  /  DBili  x   /  AST  27  /  ALT  22  /  AlkPhos  88  07-09      Urinalysis Basic - ( 10 Jul 2023 06:04 )    Color: x / Appearance: x / SG: x / pH: x  Gluc: 98 mg/dL / Ketone: x  / Bili: x / Urobili: x   Blood: x / Protein: x / Nitrite: x   Leuk Esterase: x / RBC: x / WBC x   Sq Epi: x / Non Sq Epi: x / Bacteria: x             [facility] Daily Resident Progress Note    Reason for admission: Acute on chronic sigmoid diverticulitis with perisigmoid and rectosigmoid air/fluid collections 07/09/23    SUBJECTIVE: Pt seen and examined at bedside. Refers no abdominal pain, passing gas, mild abdominal distention, no N/V, no SOB, refers no bowel movements.     Overnight events: Vitals in normal parameters, no overall pain, patient still NPO, had 1 bowel movement and is passing gas.    OBJECTIVE:  Vital Signs Last 24 Hrs  T(C): 36.6 (11 Jul 2023 00:47), Max: 37.1 (10 Jul 2023 20:40)  T(F): 97.8 (11 Jul 2023 00:47), Max: 98.7 (10 Jul 2023 20:40)  HR: 83 (11 Jul 2023 00:47) (80 - 88)  BP: 124/75 (11 Jul 2023 00:47) (109/61 - 135/94)  BP(mean): --  RR: 18 (11 Jul 2023 00:47) (18 - 18)  SpO2: 96% (11 Jul 2023 00:47) (95% - 98%)    Parameters below as of 11 Jul 2023 00:47  Patient On (Oxygen Delivery Method): room air        I&O's Summary    09 Jul 2023 07:01  -  10 Jul 2023 07:00  --------------------------------------------------------  IN: 950 mL / OUT: 0 mL / NET: 950 mL    10 Jul 2023 07:01  -  11 Jul 2023 02:49  --------------------------------------------------------  IN: 950 mL / OUT: 850 mL / NET: 100 mL        Allergies:  No Known Allergies      Medications:  MEDICATIONS  (STANDING):  amLODIPine   Tablet 5 milliGRAM(s) Oral every 24 hours  aspirin  chewable 81 milliGRAM(s) Oral daily  atorvastatin 40 milliGRAM(s) Oral at bedtime  dextrose 5% + sodium chloride 0.45% with potassium chloride 20 mEq/L 1000 milliLiter(s) (75 mL/Hr) IV Continuous <Continuous>  donepezil 5 milliGRAM(s) Oral at bedtime  enoxaparin Injectable 40 milliGRAM(s) SubCutaneous every 24 hours  ertapenem  IVPB      ertapenem  IVPB 1000 milliGRAM(s) IV Intermittent every 24 hours  pantoprazole    Tablet 40 milliGRAM(s) Oral every 12 hours    MEDICATIONS  (PRN):      Physical Exam:  General Appearance: Appears well, NAD, A&Ox3  Chest: Equal expansion bilaterally, no increased WOB.   CV: RRR, WWP.   Abdomen: soft, obese, large midline hernia, no signs of obstruction, audible bowel sounds, no rigidity or guarding.  Extremities: No swelling, asymmetry. Articular deformities in hands and feet appreciated.      Labs:                        7.5    5.56  )-----------( 285      ( 10 Jul 2023 06:04 )             25.1     07-10    143  |  108  |  19  ----------------------------<  98  4.0   |  23  |  1.15    Ca    9.0      10 Jul 2023 06:04  Phos  3.6     07-10  Mg     2.1     07-10    TPro  6.9  /  Alb  2.7<L>  /  TBili  0.4  /  DBili  x   /  AST  27  /  ALT  22  /  AlkPhos  88  07-09      Urinalysis Basic - ( 10 Jul 2023 06:04 )    Color: x / Appearance: x / SG: x / pH: x  Gluc: 98 mg/dL / Ketone: x  / Bili: x / Urobili: x   Blood: x / Protein: x / Nitrite: x   Leuk Esterase: x / RBC: x / WBC x   Sq Epi: x / Non Sq Epi: x / Bacteria: x             [facility] Daily Resident Progress Note    Reason for admission: Acute on chronic sigmoid diverticulitis with perisigmoid and rectosigmoid air/fluid collections 07/09/23    SUBJECTIVE: Pt seen and examined at bedside. Refers no abdominal pain, passing gas, mild abdominal distention, no N/V, no SOB, refers no bowel movements.     Overnight events: Vitals in normal parameters, no overall pain, patient still NPO, had 1 bowel movement and is passing gas.    OBJECTIVE:  Vital Signs Last 24 Hrs  T(C): 36.6 (11 Jul 2023 00:47), Max: 37.1 (10 Jul 2023 20:40)  T(F): 97.8 (11 Jul 2023 00:47), Max: 98.7 (10 Jul 2023 20:40)  HR: 83 (11 Jul 2023 00:47) (80 - 88)  BP: 124/75 (11 Jul 2023 00:47) (109/61 - 135/94)  BP(mean): --  RR: 18 (11 Jul 2023 00:47) (18 - 18)  SpO2: 96% (11 Jul 2023 00:47) (95% - 98%)    Parameters below as of 11 Jul 2023 00:47  Patient On (Oxygen Delivery Method): room air        I&O's Summary    09 Jul 2023 07:01  -  10 Jul 2023 07:00  --------------------------------------------------------  IN: 950 mL / OUT: 0 mL / NET: 950 mL    10 Jul 2023 07:01  -  11 Jul 2023 02:49  --------------------------------------------------------  IN: 950 mL / OUT: 850 mL / NET: 100 mL        Allergies:  No Known Allergies      Medications:  MEDICATIONS  (STANDING):  amLODIPine   Tablet 5 milliGRAM(s) Oral every 24 hours  aspirin  chewable 81 milliGRAM(s) Oral daily  atorvastatin 40 milliGRAM(s) Oral at bedtime  dextrose 5% + sodium chloride 0.45% with potassium chloride 20 mEq/L 1000 milliLiter(s) (75 mL/Hr) IV Continuous <Continuous>  donepezil 5 milliGRAM(s) Oral at bedtime  enoxaparin Injectable 40 milliGRAM(s) SubCutaneous every 24 hours  ertapenem  IVPB      ertapenem  IVPB 1000 milliGRAM(s) IV Intermittent every 24 hours  pantoprazole    Tablet 40 milliGRAM(s) Oral every 12 hours    MEDICATIONS  (PRN):      Physical Exam:  General Appearance: Appears well, NAD, A&Ox3  Chest: Equal expansion bilaterally, no increased WOB.   CV: RRR, WWP.   Abdomen: soft, obese, large midline hernia, no signs of obstruction, audible bowel sounds, no rigidity or guarding.  Extremities: No swelling, asymmetry. Articular deformities in hands and feet appreciated.      Labs:                        7.5    5.56  )-----------( 285      ( 10 Jul 2023 06:04 )             25.1     07-10    143  |  108  |  19  ----------------------------<  98  4.0   |  23  |  1.15    Ca    9.0      10 Jul 2023 06:04  Phos  3.6     07-10  Mg     2.1     07-10    TPro  6.9  /  Alb  2.7<L>  /  TBili  0.4  /  DBili  x   /  AST  27  /  ALT  22  /  AlkPhos  88  07-09      Urinalysis Basic - ( 10 Jul 2023 06:04 )    Color: x / Appearance: x / SG: x / pH: x  Gluc: 98 mg/dL / Ketone: x  / Bili: x / Urobili: x   Blood: x / Protein: x / Nitrite: x   Leuk Esterase: x / RBC: x / WBC x   Sq Epi: x / Non Sq Epi: x / Bacteria: x

## 2023-07-11 NOTE — DISCHARGE NOTE PROVIDER - HOSPITAL COURSE
83F with hx of TIA on ASA, HTN, HLD, vascular dementia and ventral hernia who presents to hospital after being found down at assisted living facility with abdominal pain and possible hematemesis. Subjective exam and HPI limited as patient with dementia. Son reports patient has had some abdominal pain for 2-3 days now. CT scan in the ED concerning for acute on chronic diverticulitis with two small mahsa-sigmoid collections concerning for perforation. CT also commented on known ventral hernia. Patient started on IV antibiotics,  made NPO and treated with pain control.    Patient admitted to Colorectal surgery team under Dr. Tavarez. GI was consulted for concern for coffee ground emesis. However given stable h/h they stated risk outweighs benefit to scope. Diet was advanced as tolerated. Physical therapy worked with patient and recommended subacute rehab. On day of discharge, the patient was tolerating diet, ambulating with assistance, and pain controlled.   83F with hx of TIA on ASA, HTN, HLD, vascular dementia and ventral hernia who presents to hospital after being found down at assisted living facility with abdominal pain and possible hematemesis. Subjective exam and HPI limited as patient with dementia. Son reports patient has had some abdominal pain for 2-3 days now. CT scan in the ED concerning for acute on chronic diverticulitis with two small mahsa-sigmoid collections concerning for perforation. CT also commented on known ventral hernia. Patient started on IV antibiotics,  made NPO and treated with pain control.    Patient admitted to Colorectal surgery team under Dr. Tavarez. GI was consulted for concern for coffee ground emesis. However given stable h/h they stated risk outweighs benefit to scope. Diet was advanced as tolerated. Physical therapy worked with patient and recommended subacute rehab. Patient to continue IV Ertapenem until 7/24/23 (total 2 weeks). On day of discharge, the patient was tolerating diet, ambulating with assistance, and pain controlled.   83F with hx of TIA on ASA, HTN, HLD, vascular dementia and ventral hernia who presents to hospital after being found down at assisted living facility with abdominal pain and possible hematemesis. Subjective exam and HPI limited as patient with dementia. Son reports patient has had some abdominal pain for 2-3 days now. CT scan in the ED concerning for acute on chronic diverticulitis with two small mahsa-sigmoid collections concerning for perforation. CT also commented on known ventral hernia. Patient started on IV antibiotics,  made NPO and treated with pain control.    Patient admitted to Colorectal surgery team under Dr. Tavarez. GI was consulted for concern for coffee ground emesis. However given stable h/h they stated risk outweighs benefit to scope. Hematemesis possibly from aspirin use- recommend life long PPI. Diet was advanced as tolerated. Physical therapy worked with patient and recommended subacute rehab. Patient to continue IV Ertapenem until 7/24/23 (total 2 weeks). On day of discharge, the patient was tolerating diet, ambulating with assistance, and pain controlled.    patient with worsening of AMS likely a combo from infection- diverticulitis and underlying vascular dementia.    83F with hx of TIA on ASA, HTN, HLD, vascular dementia and ventral hernia who presents to hospital after being found down at assisted living facility with abdominal pain and possible hematemesis. Subjective exam and HPI limited as patient with dementia. Son reports patient has had some abdominal pain for 2-3 days now. CT scan in the ED concerning for acute on chronic diverticulitis with two small mahsa-sigmoid collections concerning for perforation. CT also commented on known ventral hernia. Patient started on IV antibiotics,  made NPO and treated with pain control.    Patient admitted to Colorectal surgery team under Dr. Tavarez. GI was consulted for concern for coffee ground emesis. However given stable h/h they stated risk outweighs benefit to scope. Hematemesis possibly from aspirin use- recommend life long PPI. Diet was advanced as tolerated. Physical therapy worked with patient and recommended subacute rehab. Patient to continue IV Ertapenem until 7/24/23 (total 2 weeks). On day of discharge, the patient was tolerating diet, ambulating with assistance, and pain controlled.    patient with worsening of AMS likely a combo metabolic encephalopathy from infection- diverticulitis and underlying vascular dementia.

## 2023-07-11 NOTE — ADVANCED PRACTICE NURSE CONSULT - ASSESSMENT
PICC Insertion Note  Patient or patient represenatitve educated about central line associated blood stream infection prevention practices.  Catheter type: SL Picc  : Bard  Power injectable: Yes  LOT#[id number]    Informed consent obtained by covering floor team.  Procedure assisted by: LAXMI Amaro RN  Time out was preformed, confirming the patient's first and last name, date of birth, procedure, and correct site prior to state of procedure.    Patient was placed in supine position. Patient placement site was prepped with chlorhexidine solution, then draped using maximum sterile barrier protection. The area was injected with 2 ml of 1% lidocaine. Using the ultrasound, the catheter was introduced. Strict adherence to outline aseptic technique. Upon completion of line placement, the insertion site was covered with a sterile occlusive dressing. Pt tolerated procedure well. Minimal blood loss.   Pre procedure v/s: /88, HR 84, Temp 97,8, O2sat 96  Post Procedure v/s /73, HR 93, Temp97,8, O2sat 97    All materials used for catheter insertion, including the intact guide wire, were accounted for at the end of the procedure.    Number of attempts: 1  Complications/Comments: None    Emergency Placement: No  Site: New  Anatomical Site of insertion: Left basilic   Catheter size/length: 4 F, 45 CM  US guided Bard S lumen power picc placed    Post procedure verification with chest Xray as per orders.   PICC Insertion Note  Patient or patient represenatitve educated about central line associated blood stream infection prevention practices.  Catheter type: SL Picc  : Bard  Power injectable: Yes  LOT#[id number]    Informed consent obtained by covering floor team.  Procedure assisted by: LAXMI Amaro RN  Time out was preformed, confirming the patient's first and last name, date of birth, procedure, and correct site prior to state of procedure.    Patient was placed in supine position. Patient placement site was prepped with chlorhexidine solution, then draped using maximum sterile barrier protection. The area was injected with 2 ml of 1% lidocaine. Using the ultrasound, the catheter was introduced. Strict adherence to outline aseptic technique. Upon completion of line placement, the insertion site was covered with a sterile occlusive dressing. Pt tolerated procedure well. Minimal blood loss.   Pre procedure v/s: /88, HR 84, Temp 97,8, O2sat 96  Post Procedure v/s /73, HR 93, Temp97,8, O2sat 97    All materials used for catheter insertion, including the intact guide wire, were accounted for at the end of the procedure.    Number of attempts: 1  Complications/Comments: None    Emergency Placement: No  Site: New  Anatomical Site of insertion: Left basilic   Catheter size/length: 4 F, 45 CM  US guided Bard S lumen power picc placed    Post procedure verification with chest Xray as per orders.   PICC Insertion Note  Patient or patient represenatitve educated about central line associated blood stream infection prevention practices.  Catheter type: SL Picc  : Bard  Power injectable: Yes  LOT#[id number]    Informed consent obtained by covering floor team.  Procedure assisted by: LAXMI Amaro RN  Time out was preformed, confirming the patient's first and last name, date of birth, procedure, and correct site prior to state of procedure.    Patient was placed in supine position. Patient placement site was prepped with chlorhexidine solution, then draped using maximum sterile barrier protection. The area was injected with 2 ml of 1% lidocaine. Using the ultrasound, the catheter was introduced. Strict adherence to outline aseptic technique. Upon completion of line placement, the insertion site was covered with a sterile occlusive dressing. Pt tolerated procedure well. Minimal blood loss.   Pre procedure v/s: /88, HR 84, Temp 97,8, O2sat 96  Post Procedure v/s /73, HR 93, Temp97,8, O2sat 97    All materials used for catheter insertion, including the intact guide wire, were accounted for at the end of the procedure.    Number of attempts: 1  Complications/Comments: None    Emergency Placement: No  Site: New  Anatomical Site of insertion: Left basilic   Catheter size/length: 4 F, 45 CM  US guided Bard S lumen power picc placed    Post procedure verification with chest Xray as per orders.

## 2023-07-12 LAB
ANION GAP SERPL CALC-SCNC: 12 MMOL/L — SIGNIFICANT CHANGE UP (ref 5–17)
BUN SERPL-MCNC: 12 MG/DL — SIGNIFICANT CHANGE UP (ref 7–23)
CALCIUM SERPL-MCNC: 9.3 MG/DL — SIGNIFICANT CHANGE UP (ref 8.4–10.5)
CHLORIDE SERPL-SCNC: 108 MMOL/L — SIGNIFICANT CHANGE UP (ref 96–108)
CO2 SERPL-SCNC: 23 MMOL/L — SIGNIFICANT CHANGE UP (ref 22–31)
CREAT SERPL-MCNC: 1.24 MG/DL — SIGNIFICANT CHANGE UP (ref 0.5–1.3)
EGFR: 43 ML/MIN/1.73M2 — LOW
GLUCOSE SERPL-MCNC: 87 MG/DL — SIGNIFICANT CHANGE UP (ref 70–99)
HCT VFR BLD CALC: 27.5 % — LOW (ref 34.5–45)
HGB BLD-MCNC: 8.1 G/DL — LOW (ref 11.5–15.5)
MAGNESIUM SERPL-MCNC: 2.1 MG/DL — SIGNIFICANT CHANGE UP (ref 1.6–2.6)
MCHC RBC-ENTMCNC: 28.2 PG — SIGNIFICANT CHANGE UP (ref 27–34)
MCHC RBC-ENTMCNC: 29.5 GM/DL — LOW (ref 32–36)
MCV RBC AUTO: 95.8 FL — SIGNIFICANT CHANGE UP (ref 80–100)
NRBC # BLD: 0 /100 WBCS — SIGNIFICANT CHANGE UP (ref 0–0)
PHOSPHATE SERPL-MCNC: 3.7 MG/DL — SIGNIFICANT CHANGE UP (ref 2.5–4.5)
PLATELET # BLD AUTO: 354 K/UL — SIGNIFICANT CHANGE UP (ref 150–400)
POTASSIUM SERPL-MCNC: 4.2 MMOL/L — SIGNIFICANT CHANGE UP (ref 3.5–5.3)
POTASSIUM SERPL-SCNC: 4.2 MMOL/L — SIGNIFICANT CHANGE UP (ref 3.5–5.3)
RBC # BLD: 2.87 M/UL — LOW (ref 3.8–5.2)
RBC # FLD: 15.9 % — HIGH (ref 10.3–14.5)
SARS-COV-2 RNA SPEC QL NAA+PROBE: SIGNIFICANT CHANGE UP
SODIUM SERPL-SCNC: 143 MMOL/L — SIGNIFICANT CHANGE UP (ref 135–145)
WBC # BLD: 6.54 K/UL — SIGNIFICANT CHANGE UP (ref 3.8–10.5)
WBC # FLD AUTO: 6.54 K/UL — SIGNIFICANT CHANGE UP (ref 3.8–10.5)

## 2023-07-12 RX ORDER — SODIUM CHLORIDE 9 MG/ML
1000 INJECTION INTRAMUSCULAR; INTRAVENOUS; SUBCUTANEOUS
Refills: 0 | Status: DISCONTINUED | OUTPATIENT
Start: 2023-07-12 | End: 2023-07-13

## 2023-07-12 RX ADMIN — PANTOPRAZOLE SODIUM 40 MILLIGRAM(S): 20 TABLET, DELAYED RELEASE ORAL at 17:23

## 2023-07-12 RX ADMIN — DONEPEZIL HYDROCHLORIDE 5 MILLIGRAM(S): 10 TABLET, FILM COATED ORAL at 22:13

## 2023-07-12 RX ADMIN — SODIUM CHLORIDE 50 MILLILITER(S): 9 INJECTION INTRAMUSCULAR; INTRAVENOUS; SUBCUTANEOUS at 16:09

## 2023-07-12 RX ADMIN — PANTOPRAZOLE SODIUM 40 MILLIGRAM(S): 20 TABLET, DELAYED RELEASE ORAL at 05:50

## 2023-07-12 RX ADMIN — ENOXAPARIN SODIUM 40 MILLIGRAM(S): 100 INJECTION SUBCUTANEOUS at 22:13

## 2023-07-12 RX ADMIN — Medication 81 MILLIGRAM(S): at 11:06

## 2023-07-12 RX ADMIN — ATORVASTATIN CALCIUM 40 MILLIGRAM(S): 80 TABLET, FILM COATED ORAL at 22:13

## 2023-07-12 RX ADMIN — ERTAPENEM SODIUM 120 MILLIGRAM(S): 1 INJECTION, POWDER, LYOPHILIZED, FOR SOLUTION INTRAMUSCULAR; INTRAVENOUS at 05:48

## 2023-07-12 RX ADMIN — AMLODIPINE BESYLATE 5 MILLIGRAM(S): 2.5 TABLET ORAL at 22:15

## 2023-07-12 NOTE — PROGRESS NOTE ADULT - SUBJECTIVE AND OBJECTIVE BOX
[facility] Daily Resident Progress Note    Reason for admission:      SUBJECTIVE: Pt seen and examined at bedside. +/- chest pain, SOB, N/V, flatus, BM, UOP, pain.     OBJECTIVE:  Vital Signs Last 24 Hrs  T(C): 36.3 (12 Jul 2023 04:23), Max: 36.9 (11 Jul 2023 16:08)  T(F): 97.4 (12 Jul 2023 04:23), Max: 98.4 (11 Jul 2023 16:08)  HR: 94 (12 Jul 2023 04:23) (69 - 99)  BP: 113/71 (12 Jul 2023 04:23) (106/72 - 132/83)  BP(mean): --  RR: 18 (12 Jul 2023 04:23) (18 - 18)  SpO2: 93% (12 Jul 2023 04:23) (92% - 99%)    Parameters below as of 12 Jul 2023 04:23  Patient On (Oxygen Delivery Method): room air        I&O's Summary    10 Jul 2023 07:01  -  11 Jul 2023 07:00  --------------------------------------------------------  IN: 1950 mL / OUT: 850 mL / NET: 1100 mL    11 Jul 2023 07:01  -  12 Jul 2023 04:48  --------------------------------------------------------  IN: 200 mL / OUT: 1500 mL / NET: -1300 mL        Allergies:  No Known Allergies      Medications:  MEDICATIONS  (STANDING):  amLODIPine   Tablet 5 milliGRAM(s) Oral every 24 hours  aspirin  chewable 81 milliGRAM(s) Oral daily  atorvastatin 40 milliGRAM(s) Oral at bedtime  chlorhexidine 4% Liquid 1 Application(s) Topical <User Schedule>  donepezil 5 milliGRAM(s) Oral at bedtime  enoxaparin Injectable 40 milliGRAM(s) SubCutaneous every 24 hours  ertapenem  IVPB      ertapenem  IVPB 1000 milliGRAM(s) IV Intermittent every 24 hours  pantoprazole    Tablet 40 milliGRAM(s) Oral every 12 hours    MEDICATIONS  (PRN):  sodium chloride 0.9% lock flush 10 milliLiter(s) IV Push every 1 hour PRN Pre/post blood products, medications, blood draw, and to maintain line patency      Physical Exam:  General Appearance: Appears well, NAD, A&Ox3  Chest: Equal expansion bilaterally, no increased WOB.   CV: RRR, WWP.   Abdomen:   Extremities: No swelling, asymmetry, or gross deformity appreciated.     Labs:                        7.9    6.56  )-----------( 340      ( 11 Jul 2023 08:47 )             27.1     07-11    142  |  107  |  13  ----------------------------<  93  4.3   |  23  |  1.11    Ca    9.5      11 Jul 2023 08:47  Phos  3.4     07-11  Mg     2.1     07-11        Urinalysis Basic - ( 11 Jul 2023 08:47 )    Color: x / Appearance: x / SG: x / pH: x  Gluc: 93 mg/dL / Ketone: x  / Bili: x / Urobili: x   Blood: x / Protein: x / Nitrite: x   Leuk Esterase: x / RBC: x / WBC x   Sq Epi: x / Non Sq Epi: x / Bacteria: x      CAPILLARY BLOOD GLUCOSE          Microbiology:      Radiology & additional studies:           [facility] Daily Resident Progress Note    Reason for admission: Acute on chronic sigmoid diverticulitis with perisigmoid and rectosigmoid air/fluid collections 07/09/23    [place] events: No overall abdominal pain, no N/V, fever or chills.     SUBJECTIVE: Pt seen and examined at bedside, denies abdominal pain, is passing gas, yesterday she had one bowel movement, no associated nausea or vomit. Limited information due to PMHx of dementia.     OBJECTIVE:  Vital Signs Last 24 Hrs  T(C): 36.3 (12 Jul 2023 04:23), Max: 36.9 (11 Jul 2023 16:08)  T(F): 97.4 (12 Jul 2023 04:23), Max: 98.4 (11 Jul 2023 16:08)  HR: 94 (12 Jul 2023 04:23) (69 - 99)  BP: 113/71 (12 Jul 2023 04:23) (106/72 - 132/83)  BP(mean): --  RR: 18 (12 Jul 2023 04:23) (18 - 18)  SpO2: 93% (12 Jul 2023 04:23) (92% - 99%)    Parameters below as of 12 Jul 2023 04:23  Patient On (Oxygen Delivery Method): room air        I&O's Summary    10 Jul 2023 07:01  -  11 Jul 2023 07:00  --------------------------------------------------------  IN: 1950 mL / OUT: 850 mL / NET: 1100 mL    11 Jul 2023 07:01  -  12 Jul 2023 04:48  --------------------------------------------------------  IN: 200 mL / OUT: 1500 mL / NET: -1300 mL        Allergies:  No Known Allergies      Medications:  MEDICATIONS  (STANDING):  amLODIPine   Tablet 5 milliGRAM(s) Oral every 24 hours  aspirin  chewable 81 milliGRAM(s) Oral daily  atorvastatin 40 milliGRAM(s) Oral at bedtime  chlorhexidine 4% Liquid 1 Application(s) Topical <User Schedule>  donepezil 5 milliGRAM(s) Oral at bedtime  enoxaparin Injectable 40 milliGRAM(s) SubCutaneous every 24 hours  ertapenem  IVPB      ertapenem  IVPB 1000 milliGRAM(s) IV Intermittent every 24 hours  pantoprazole    Tablet 40 milliGRAM(s) Oral every 12 hours    MEDICATIONS  (PRN):  sodium chloride 0.9% lock flush 10 milliLiter(s) IV Push every 1 hour PRN Pre/post blood products, medications, blood draw, and to maintain line patency      Physical Exam:  General Appearance: Appears well, NAD, A&Ox2.   Chest: Equal expansion bilaterally, no increased WOB.   CV: RRR, WWP.   Abdomen: soft, nontender, nondistended, no rigidity, no guarding.  Extremities: No swelling, asymmetry, or gross deformity appreciated.     Labs:                        7.9    6.56  )-----------( 340      ( 11 Jul 2023 08:47 )             27.1     07-11    142  |  107  |  13  ----------------------------<  93  4.3   |  23  |  1.11    Ca    9.5      11 Jul 2023 08:47  Phos  3.4     07-11  Mg     2.1     07-11        Urinalysis Basic - ( 11 Jul 2023 08:47 )    Color: x / Appearance: x / SG: x / pH: x  Gluc: 93 mg/dL / Ketone: x  / Bili: x / Urobili: x   Blood: x / Protein: x / Nitrite: x   Leuk Esterase: x / RBC: x / WBC x   Sq Epi: x / Non Sq Epi: x / Bacteria: x      CAPILLARY BLOOD GLUCOSE          Microbiology:      Radiology & additional studies:           [facility] Daily Resident Progress Note    Reason for admission: Acute on chronic sigmoid diverticulitis with perisigmoid and rectosigmoid air/fluid collections 07/09/23    SUBJECTIVE: Pt seen and examined at bedside. Feeling well with some abdominal pain. Admits to passing flatus. Tolerating clear liquid diet. No associated nausea or vomit. Limited information due to PMHx of dementia.     OBJECTIVE:  Vital Signs Last 24 Hrs  T(C): 36.3 (12 Jul 2023 04:23), Max: 36.9 (11 Jul 2023 16:08)  T(F): 97.4 (12 Jul 2023 04:23), Max: 98.4 (11 Jul 2023 16:08)  HR: 94 (12 Jul 2023 04:23) (69 - 99)  BP: 113/71 (12 Jul 2023 04:23) (106/72 - 132/83)  BP(mean): --  RR: 18 (12 Jul 2023 04:23) (18 - 18)  SpO2: 93% (12 Jul 2023 04:23) (92% - 99%)    Parameters below as of 12 Jul 2023 04:23  Patient On (Oxygen Delivery Method): room air        I&O's Summary    10 Jul 2023 07:01  -  11 Jul 2023 07:00  --------------------------------------------------------  IN: 1950 mL / OUT: 850 mL / NET: 1100 mL    11 Jul 2023 07:01  -  12 Jul 2023 04:48  --------------------------------------------------------  IN: 200 mL / OUT: 1500 mL / NET: -1300 mL        Allergies:  No Known Allergies      Medications:  MEDICATIONS  (STANDING):  amLODIPine   Tablet 5 milliGRAM(s) Oral every 24 hours  aspirin  chewable 81 milliGRAM(s) Oral daily  atorvastatin 40 milliGRAM(s) Oral at bedtime  chlorhexidine 4% Liquid 1 Application(s) Topical <User Schedule>  donepezil 5 milliGRAM(s) Oral at bedtime  enoxaparin Injectable 40 milliGRAM(s) SubCutaneous every 24 hours  ertapenem  IVPB      ertapenem  IVPB 1000 milliGRAM(s) IV Intermittent every 24 hours  pantoprazole    Tablet 40 milliGRAM(s) Oral every 12 hours    MEDICATIONS  (PRN):  sodium chloride 0.9% lock flush 10 milliLiter(s) IV Push every 1 hour PRN Pre/post blood products, medications, blood draw, and to maintain line patency      Physical Exam:  General Appearance: Appears well, NAD, A&Ox2.   Chest: Equal expansion bilaterally, no increased WOB.   CV: RRR, WWP.   Abdomen: soft, minimally tender, nondistended, no rigidity, no guarding.  Extremities: No swelling, asymmetry, or gross deformity appreciated.     Labs:                        7.9    6.56  )-----------( 340      ( 11 Jul 2023 08:47 )             27.1     07-11    142  |  107  |  13  ----------------------------<  93  4.3   |  23  |  1.11    Ca    9.5      11 Jul 2023 08:47  Phos  3.4     07-11  Mg     2.1     07-11    Urinalysis Basic - ( 11 Jul 2023 08:47 )  Color: x / Appearance: x / SG: x / pH: x  Gluc: 93 mg/dL / Ketone: x  / Bili: x / Urobili: x   Blood: x / Protein: x / Nitrite: x   Leuk Esterase: x / RBC: x / WBC x   Sq Epi: x / Non Sq Epi: x / Bacteria: x     [facility] Daily Resident Progress Note    Reason for admission: Acute on chronic sigmoid diverticulitis with perisigmoid and rectosigmoid air/fluid collections 07/09/23    SUBJECTIVE: Pt seen and examined at bedside. Feeling well with some abdominal pain. Admits to passing flatus. Tolerating clear liquid diet. No associated nausea or vomit. Limited information due to PMHx of dementia.     OBJECTIVE:  Vital Signs Last 24 Hrs  T(C): 36.3 (12 Jul 2023 04:23), Max: 36.9 (11 Jul 2023 16:08)  T(F): 97.4 (12 Jul 2023 04:23), Max: 98.4 (11 Jul 2023 16:08)  HR: 94 (12 Jul 2023 04:23) (69 - 99)  BP: 113/71 (12 Jul 2023 04:23) (106/72 - 132/83)  BP(mean): --  RR: 18 (12 Jul 2023 04:23) (18 - 18)  SpO2: 93% (12 Jul 2023 04:23) (92% - 99%)    Parameters below as of 12 Jul 2023 04:23  Patient On (Oxygen Delivery Method): room air        I&O's Summary    10 Jul 2023 07:01  -  11 Jul 2023 07:00  --------------------------------------------------------  IN: 1950 mL / OUT: 850 mL / NET: 1100 mL    11 Jul 2023 07:01  -  12 Jul 2023 04:48  --------------------------------------------------------  IN: 200 mL / OUT: 1500 mL / NET: -1300 mL        Allergies:  No Known Allergies      Medications:  MEDICATIONS  (STANDING):  amLODIPine   Tablet 5 milliGRAM(s) Oral every 24 hours  aspirin  chewable 81 milliGRAM(s) Oral daily  atorvastatin 40 milliGRAM(s) Oral at bedtime  chlorhexidine 4% Liquid 1 Application(s) Topical <User Schedule>  donepezil 5 milliGRAM(s) Oral at bedtime  enoxaparin Injectable 40 milliGRAM(s) SubCutaneous every 24 hours  ertapenem  IVPB      ertapenem  IVPB 1000 milliGRAM(s) IV Intermittent every 24 hours  pantoprazole    Tablet 40 milliGRAM(s) Oral every 12 hours    MEDICATIONS  (PRN):  sodium chloride 0.9% lock flush 10 milliLiter(s) IV Push every 1 hour PRN Pre/post blood products, medications, blood draw, and to maintain line patency      Physical Exam:  General Appearance: Appears well, NAD, A&Ox2.   Chest: Equal expansion bilaterally, no increased WOB.   CV: RRR, WWP.   Abdomen: soft, minimally tender, nondistended, no rigidity, no guarding.  Extremities: No swelling, asymmetry, or gross deformity appreciated.     Labs:                        7.9    6.56  )-----------( 340      ( 11 Jul 2023 08:47 )             27.1     07-11    142  |  107  |  13  ----------------------------<  93  4.3   |  23  |  1.11    Ca    9.5      11 Jul 2023 08:47  Phos  3.4     07-11  Mg     2.1     07-11    Urinalysis Basic - ( 11 Jul 2023 08:47 )  Color: x / Appearance: x / SG: x / pH: x  Gluc: 93 mg/dL / Ketone: x  / Bili: x / Urobili: x   Blood: x / Protein: x / Nitrite: x   Leuk Esterase: x / RBC: x / WBC x   Sq Epi: x / Non Sq Epi: x / Bacteria: x     [facility] Daily Resident Progress Note    Reason for admission: Acute on chronic sigmoid diverticulitis with perisigmoid and rectosigmoid air/fluid collections 07/09/23    SUBJECTIVE: Pt seen and examined at bedside. Feeling well with some abdominal pain. Admits to passing flatus. Tolerating clear liquid diet. No associated nausea or vomit. Limited information due to PMHx of dementia.     OBJECTIVE:  Vital Signs Last 24 Hrs  T(C): 36.3 (12 Jul 2023 04:23), Max: 36.9 (11 Jul 2023 16:08)  T(F): 97.4 (12 Jul 2023 04:23), Max: 98.4 (11 Jul 2023 16:08)  HR: 94 (12 Jul 2023 04:23) (69 - 99)  BP: 113/71 (12 Jul 2023 04:23) (106/72 - 132/83)  BP(mean): --  RR: 18 (12 Jul 2023 04:23) (18 - 18)  SpO2: 93% (12 Jul 2023 04:23) (92% - 99%)    Parameters below as of 12 Jul 2023 04:23  Patient On (Oxygen Delivery Method): room air        I&O's Summary    10 Jul 2023 07:01  -  11 Jul 2023 07:00  --------------------------------------------------------  IN: 1950 mL / OUT: 850 mL / NET: 1100 mL    11 Jul 2023 07:01  -  12 Jul 2023 04:48  --------------------------------------------------------  IN: 200 mL / OUT: 1500 mL / NET: -1300 mL        Allergies:  No Known Allergies      Medications:  MEDICATIONS  (STANDING):  amLODIPine   Tablet 5 milliGRAM(s) Oral every 24 hours  aspirin  chewable 81 milliGRAM(s) Oral daily  atorvastatin 40 milliGRAM(s) Oral at bedtime  chlorhexidine 4% Liquid 1 Application(s) Topical <User Schedule>  donepezil 5 milliGRAM(s) Oral at bedtime  enoxaparin Injectable 40 milliGRAM(s) SubCutaneous every 24 hours  ertapenem  IVPB      ertapenem  IVPB 1000 milliGRAM(s) IV Intermittent every 24 hours  pantoprazole    Tablet 40 milliGRAM(s) Oral every 12 hours    MEDICATIONS  (PRN):  sodium chloride 0.9% lock flush 10 milliLiter(s) IV Push every 1 hour PRN Pre/post blood products, medications, blood draw, and to maintain line patency      Physical Exam:  General Appearance: Appears well, NAD, A&Ox2.   Chest: Equal expansion bilaterally, no increased WOB.   CV: RRR, WWP.   Abdomen: soft, minimally tender, nondistended, no rigidity, no guarding.  Extremities: No swelling, asymmetry, or gross deformity appreciated.     Labs:                        7.9    6.56  )-----------( 340      ( 11 Jul 2023 08:47 )             27.1     07-11    142  |  107  |  13  ----------------------------<  93  4.3   |  23  |  1.11    Ca    9.5      11 Jul 2023 08:47  Phos  3.4     07-11  Mg     2.1     07-11    Urinalysis Basic - ( 11 Jul 2023 08:47 )  Color: x / Appearance: x / SG: x / pH: x  Gluc: 93 mg/dL / Ketone: x  / Bili: x / Urobili: x   Blood: x / Protein: x / Nitrite: x   Leuk Esterase: x / RBC: x / WBC x   Sq Epi: x / Non Sq Epi: x / Bacteria: x

## 2023-07-12 NOTE — PROGRESS NOTE ADULT - ASSESSMENT
83F with hx of TIA on ASA, HTN, HLD, vascular dementia and ventral hernia. Limited HPI information as patient has dementia. CT scan in the ED 07/09 concerning for acute on chronic diverticulitis with two small mahsa-sigmoid collections concerning for perforation. Collections 3.7x1.2cm and 2.7x1.2cm. Yesterday patient had a PICC line placed. No overnight events, no overall pain, had one non-bloody bowel movement yesterday, and is tolerating adequately clear liquid diet.      Plan:   - OOB with assistance  - Diet: CLD  - Enoxaparin (Lovenox) 40mg SC QD  - Ertapenem 1g QD  - PPI: Pantoprazole 40mg BID  - Continue Home meds  - D/c planning     [facility]  [id number] with hx of TIA on ASA, HTN, HLD, vascular dementia and ventral hernia. Limited HPI information as patient has dementia. CT scan in the ED 07/09 concerning for acute on chronic diverticulitis with two small mahsa-sigmoid collections concerning for perforation. Collections 3.7x1.2cm and 2.7x1.2cm. Yesterday patient had a PICC line placed. No overnight events, no overall pain, had one non-bloody bowel movement yesterday, and is tolerating adequately clear liquid diet.      Plan:   - OOB with assistance  - Diet: CLD  - Enoxaparin (Lovenox) 40mg SC QD  - Ertapenem 1g QD  - PPI: Pantoprazole 40mg BID  - Continue Home meds  - D/c planning     [facility]  [id number] with hx of TIA on ASA, HTN, HLD, vascular dementia and ventral hernia. Limited HPI information as patient has dementia. CT scan in the ED 07/09 concerning for acute on chronic diverticulitis with two small mahsa-sigmoid collections concerning for perforation. Collections 3.7x1.2cm and 2.7x1.2cm. Yesterday patient had a PICC line placed. No overnight events, no overall pain, had one non-bloody bowel movement yesterday, and is tolerating adequately clear liquid diet.      Plan:   - OOB with assistance  - Diet: CLD  - Enoxaparin (Lovenox) 40mg SC QD  - Ertapenem 1g QD  - PPI: Pantoprazole 40mg BID  - Continue Home meds  - D/c planning     [facility]  [id number] 83F with hx of TIA on ASA, HTN, HLD, vascular dementia and ventral hernia. Limited HPI information as patient has dementia. CT scan in the ED 07/09 concerning for acute on chronic diverticulitis with two small mahsa-sigmoid collections concerning for perforation. Collections 3.7x1.2cm and 2.7x1.2cm. PICC line placed 7/11    Plan:   - OOB with assistance  - Diet: Advance to low fiber diet  - Enoxaparin (Lovenox) 40mg SC QD  - Ertapenem 1g QD  - PPI: Pantoprazole 40mg BID  - Continue Home meds  - D/c planning to [facility]  [id number] with hx of TIA on ASA, HTN, HLD, vascular dementia and ventral hernia. Limited HPI information as patient has dementia. CT scan in the ED 07/09 concerning for acute on chronic diverticulitis with two small mahsa-sigmoid collections concerning for perforation. Collections 3.7x1.2cm and 2.7x1.2cm. PICC line placed 7/11    Plan:   - OOB with assistance  - Diet: Advance to low fiber diet  - Enoxaparin (Lovenox) 40mg SC QD  - Ertapenem 1g QD  - PPI: Pantoprazole 40mg BID  - Continue Home meds  - D/c planning to [facility]  [id number] with hx of TIA on ASA, HTN, HLD, vascular dementia and ventral hernia. Limited HPI information as patient has dementia. CT scan in the ED 07/09 concerning for acute on chronic diverticulitis with two small mahsa-sigmoid collections concerning for perforation. Collections 3.7x1.2cm and 2.7x1.2cm. PICC line placed 7/11    Plan:   - OOB with assistance  - Diet: Advance to low fiber diet  - Enoxaparin (Lovenox) 40mg SC QD  - Ertapenem 1g QD  - PPI: Pantoprazole 40mg BID  - Continue Home meds  - D/c planning to [facility]  [id number]

## 2023-07-13 ENCOUNTER — TRANSCRIPTION ENCOUNTER (OUTPATIENT)
Age: 83
End: 2023-07-13

## 2023-07-13 VITALS
TEMPERATURE: 98 F | SYSTOLIC BLOOD PRESSURE: 134 MMHG | DIASTOLIC BLOOD PRESSURE: 93 MMHG | OXYGEN SATURATION: 94 % | HEART RATE: 90 BPM | RESPIRATION RATE: 18 BRPM

## 2023-07-13 LAB
ANION GAP SERPL CALC-SCNC: 12 MMOL/L — SIGNIFICANT CHANGE UP (ref 5–17)
BUN SERPL-MCNC: 12 MG/DL — SIGNIFICANT CHANGE UP (ref 7–23)
CALCIUM SERPL-MCNC: 9.6 MG/DL — SIGNIFICANT CHANGE UP (ref 8.4–10.5)
CHLORIDE SERPL-SCNC: 105 MMOL/L — SIGNIFICANT CHANGE UP (ref 96–108)
CO2 SERPL-SCNC: 23 MMOL/L — SIGNIFICANT CHANGE UP (ref 22–31)
CREAT SERPL-MCNC: 1.31 MG/DL — HIGH (ref 0.5–1.3)
EGFR: 40 ML/MIN/1.73M2 — LOW
GLUCOSE SERPL-MCNC: 87 MG/DL — SIGNIFICANT CHANGE UP (ref 70–99)
HCT VFR BLD CALC: 27.9 % — LOW (ref 34.5–45)
HGB BLD-MCNC: 8.2 G/DL — LOW (ref 11.5–15.5)
MAGNESIUM SERPL-MCNC: 2.2 MG/DL — SIGNIFICANT CHANGE UP (ref 1.6–2.6)
MCHC RBC-ENTMCNC: 28 PG — SIGNIFICANT CHANGE UP (ref 27–34)
MCHC RBC-ENTMCNC: 29.4 GM/DL — LOW (ref 32–36)
MCV RBC AUTO: 95.2 FL — SIGNIFICANT CHANGE UP (ref 80–100)
NRBC # BLD: 0 /100 WBCS — SIGNIFICANT CHANGE UP (ref 0–0)
PHOSPHATE SERPL-MCNC: 3.8 MG/DL — SIGNIFICANT CHANGE UP (ref 2.5–4.5)
PLATELET # BLD AUTO: 345 K/UL — SIGNIFICANT CHANGE UP (ref 150–400)
POTASSIUM SERPL-MCNC: 4 MMOL/L — SIGNIFICANT CHANGE UP (ref 3.5–5.3)
POTASSIUM SERPL-SCNC: 4 MMOL/L — SIGNIFICANT CHANGE UP (ref 3.5–5.3)
RBC # BLD: 2.93 M/UL — LOW (ref 3.8–5.2)
RBC # FLD: 15.9 % — HIGH (ref 10.3–14.5)
SODIUM SERPL-SCNC: 140 MMOL/L — SIGNIFICANT CHANGE UP (ref 135–145)
WBC # BLD: 6.37 K/UL — SIGNIFICANT CHANGE UP (ref 3.8–10.5)
WBC # FLD AUTO: 6.37 K/UL — SIGNIFICANT CHANGE UP (ref 3.8–10.5)

## 2023-07-13 PROCEDURE — 83605 ASSAY OF LACTIC ACID: CPT

## 2023-07-13 PROCEDURE — 97162 PT EVAL MOD COMPLEX 30 MIN: CPT

## 2023-07-13 PROCEDURE — 36569 INSJ PICC 5 YR+ W/O IMAGING: CPT

## 2023-07-13 PROCEDURE — 97110 THERAPEUTIC EXERCISES: CPT

## 2023-07-13 PROCEDURE — 84100 ASSAY OF PHOSPHORUS: CPT

## 2023-07-13 PROCEDURE — 84132 ASSAY OF SERUM POTASSIUM: CPT

## 2023-07-13 PROCEDURE — C1751: CPT

## 2023-07-13 PROCEDURE — 99285 EMERGENCY DEPT VISIT HI MDM: CPT

## 2023-07-13 PROCEDURE — 87635 SARS-COV-2 COVID-19 AMP PRB: CPT

## 2023-07-13 PROCEDURE — 87040 BLOOD CULTURE FOR BACTERIA: CPT

## 2023-07-13 PROCEDURE — 85025 COMPLETE CBC W/AUTO DIFF WBC: CPT

## 2023-07-13 PROCEDURE — 80048 BASIC METABOLIC PNL TOTAL CA: CPT

## 2023-07-13 PROCEDURE — 71045 X-RAY EXAM CHEST 1 VIEW: CPT

## 2023-07-13 PROCEDURE — 82272 OCCULT BLD FECES 1-3 TESTS: CPT

## 2023-07-13 PROCEDURE — 84295 ASSAY OF SERUM SODIUM: CPT

## 2023-07-13 PROCEDURE — 85018 HEMOGLOBIN: CPT

## 2023-07-13 PROCEDURE — 70450 CT HEAD/BRAIN W/O DYE: CPT | Mod: MD

## 2023-07-13 PROCEDURE — 96375 TX/PRO/DX INJ NEW DRUG ADDON: CPT

## 2023-07-13 PROCEDURE — 74177 CT ABD & PELVIS W/CONTRAST: CPT | Mod: MB

## 2023-07-13 PROCEDURE — 97530 THERAPEUTIC ACTIVITIES: CPT

## 2023-07-13 PROCEDURE — 85027 COMPLETE CBC AUTOMATED: CPT

## 2023-07-13 PROCEDURE — 83690 ASSAY OF LIPASE: CPT

## 2023-07-13 PROCEDURE — 83735 ASSAY OF MAGNESIUM: CPT

## 2023-07-13 PROCEDURE — 36415 COLL VENOUS BLD VENIPUNCTURE: CPT

## 2023-07-13 PROCEDURE — 87086 URINE CULTURE/COLONY COUNT: CPT

## 2023-07-13 PROCEDURE — 86900 BLOOD TYPING SEROLOGIC ABO: CPT

## 2023-07-13 PROCEDURE — 82435 ASSAY OF BLOOD CHLORIDE: CPT

## 2023-07-13 PROCEDURE — 82330 ASSAY OF CALCIUM: CPT

## 2023-07-13 PROCEDURE — 81001 URINALYSIS AUTO W/SCOPE: CPT

## 2023-07-13 PROCEDURE — 96374 THER/PROPH/DIAG INJ IV PUSH: CPT | Mod: XU

## 2023-07-13 PROCEDURE — 82947 ASSAY GLUCOSE BLOOD QUANT: CPT

## 2023-07-13 PROCEDURE — 86901 BLOOD TYPING SEROLOGIC RH(D): CPT

## 2023-07-13 PROCEDURE — 93005 ELECTROCARDIOGRAM TRACING: CPT

## 2023-07-13 PROCEDURE — 85014 HEMATOCRIT: CPT

## 2023-07-13 PROCEDURE — 80053 COMPREHEN METABOLIC PANEL: CPT

## 2023-07-13 PROCEDURE — C1894: CPT

## 2023-07-13 PROCEDURE — 84484 ASSAY OF TROPONIN QUANT: CPT

## 2023-07-13 PROCEDURE — 82803 BLOOD GASES ANY COMBINATION: CPT

## 2023-07-13 PROCEDURE — 86850 RBC ANTIBODY SCREEN: CPT

## 2023-07-13 RX ORDER — PANTOPRAZOLE SODIUM 20 MG/1
1 TABLET, DELAYED RELEASE ORAL
Qty: 0 | Refills: 0 | DISCHARGE
Start: 2023-07-13

## 2023-07-13 RX ORDER — ERTAPENEM SODIUM 1 G/1
1 INJECTION, POWDER, LYOPHILIZED, FOR SOLUTION INTRAMUSCULAR; INTRAVENOUS
Qty: 0 | Refills: 0 | DISCHARGE
Start: 2023-07-13 | End: 2023-07-24

## 2023-07-13 RX ORDER — ENOXAPARIN SODIUM 100 MG/ML
40 INJECTION SUBCUTANEOUS
Qty: 0 | Refills: 0 | DISCHARGE
Start: 2023-07-13

## 2023-07-13 RX ADMIN — CHLORHEXIDINE GLUCONATE 1 APPLICATION(S): 213 SOLUTION TOPICAL at 05:01

## 2023-07-13 RX ADMIN — Medication 81 MILLIGRAM(S): at 14:28

## 2023-07-13 RX ADMIN — ERTAPENEM SODIUM 120 MILLIGRAM(S): 1 INJECTION, POWDER, LYOPHILIZED, FOR SOLUTION INTRAMUSCULAR; INTRAVENOUS at 06:04

## 2023-07-13 NOTE — DISCHARGE NOTE NURSING/CASE MANAGEMENT/SOCIAL WORK - PATIENT PORTAL LINK FT
You can access the FollowMyHealth Patient Portal offered by [facility] by registering at the following website: [email or web address]. By joining [employer]’s FollowMyHealth portal, you will also be able to view your health information using other applications (apps) compatible with our system. You can access the FollowMyHealth Patient Portal offered by [facility] by registering at the following website: [email or web address]. By joining "[email or web address]’s FollowMyHealth portal, you will also be able to view your health information using other applications (apps) compatible with our system. You can access the FollowMyHealth Patient Portal offered by [facility] by registering at the following website: [email or web address]. By joining [email or web address]’s FollowMyHealth portal, you will also be able to view your health information using other applications (apps) compatible with our system.

## 2023-07-13 NOTE — PROGRESS NOTE ADULT - ATTENDING COMMENTS
Doing well.  No pain or tenderness.  Advance diet as tolerated.  PONCHO on Invanz and PPI when tolerating solid diet and arrangements made
Stable hgb  no more episodes of CGE, hematemesis, or melena  No role EGD  c/w daily PPI lifelong given asa  no further inpatient GI workup  rest of management as per surgery  GI will s/o
No pain and no tenderness.  Awaiting bed subacute rehab.  Continue intravenous antibiotics total 2-week course.  Continue PPI.
Patient seen by me at 7:20 AM.  Denies abdominal pain.  Abdomen soft and nontender.  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  She is without pain or tenderness, and has a normal white blood count.  There is no role for IR consult since these abscesses are small and difficult to reach.  However, because of the chronic nature and because of the need to try to avoid surgery in this elderly woman with dementia will plan on a prolonged course of intravenous antibiotics which can be continued as an outpatient.  GI consult regarding upper endoscopy noted.  We will continue to monitor for evidence of upper GI bleed which would then necessitate an upper endoscopy.
No pain or tenderness.  Diet advanced.  Subacute rehab when bed available.  Complete 2 weeks of Invanz with a follow-up CT.  Continue PPI

## 2023-07-13 NOTE — PROGRESS NOTE ADULT - REASON FOR ADMISSION
Acute on chronic sigmoid diverticulitis with perisigmoid and rectosigmoid air/fluid collections 07/09/23
Coffee ground emesis, AMS
Acute on chronic sigmoid diverticulitis with perisigmoid and rectosigmoid air/fluid collections 07/09/23
Acute on chronic sigmoid diverticulitis with perisigmoid and rectosigmoid air/fluid collections 07/09/23

## 2023-07-13 NOTE — PROGRESS NOTE ADULT - SUBJECTIVE AND OBJECTIVE BOX
[facility] Daily Resident Progress Note    Reason for admission: Acute on chronic sigmoid diverticulitis with perisigmoid and rectosigmoid air/fluid collections 07/09/23    SUBJECTIVE: Pt seen and examined at bedside. Denies chest pain, no SOB, no N/V, passing flatus, no BM, no overall pain.     OBJECTIVE:  Vital Signs Last 24 Hrs  T(C): 36.6 (13 Jul 2023 04:14), Max: 37.3 (12 Jul 2023 10:21)  T(F): 97.9 (13 Jul 2023 04:14), Max: 99.1 (12 Jul 2023 10:21)  HR: 85 (13 Jul 2023 04:14) (75 - 100)  BP: 129/68 (13 Jul 2023 04:14) (121/77 - 147/85)  BP(mean): --  RR: 18 (13 Jul 2023 04:14) (18 - 18)  SpO2: 93% (13 Jul 2023 04:14) (93% - 96%)    Parameters below as of 13 Jul 2023 04:14  Patient On (Oxygen Delivery Method): room air        I&O's Summary    11 Jul 2023 07:01  -  12 Jul 2023 07:00  --------------------------------------------------------  IN: 250 mL / OUT: 1600 mL / NET: -1350 mL    12 Jul 2023 07:01  -  13 Jul 2023 06:47  --------------------------------------------------------  IN: 590 mL / OUT: 600 mL / NET: -10 mL        Allergies:  No Known Allergies      Medications:  MEDICATIONS  (STANDING):  amLODIPine   Tablet 5 milliGRAM(s) Oral every 24 hours  aspirin  chewable 81 milliGRAM(s) Oral daily  atorvastatin 40 milliGRAM(s) Oral at bedtime  chlorhexidine 4% Liquid 1 Application(s) Topical <User Schedule>  donepezil 5 milliGRAM(s) Oral at bedtime  enoxaparin Injectable 40 milliGRAM(s) SubCutaneous every 24 hours  ertapenem  IVPB      ertapenem  IVPB 1000 milliGRAM(s) IV Intermittent every 24 hours  pantoprazole    Tablet 40 milliGRAM(s) Oral every 12 hours  sodium chloride 0.9%. 1000 milliLiter(s) (50 mL/Hr) IV Continuous <Continuous>    MEDICATIONS  (PRN):  sodium chloride 0.9% lock flush 10 milliLiter(s) IV Push every 1 hour PRN Pre/post blood products, medications, blood draw, and to maintain line patency      Physical Exam:  General Appearance: Appears well, NAD, A&Ox2.   Chest: Equal expansion bilaterally, no increased WOB.   CV: RRR, WWP.   Abdomen: soft, minimally tender, nondistended, no rigidity, no guarding.  Extremities: No swelling, asymmetry, or gross deformity appreciated.     Labs:                        8.1    6.54  )-----------( 354      ( 12 Jul 2023 09:20 )             27.5     07-12    143  |  108  |  12  ----------------------------<  87  4.2   |  23  |  1.24    Ca    9.3      12 Jul 2023 09:20  Phos  3.7     07-12  Mg     2.1     07-12        Urinalysis Basic - ( 12 Jul 2023 09:20 )    Color: x / Appearance: x / SG: x / pH: x  Gluc: 87 mg/dL / Ketone: x  / Bili: x / Urobili: x   Blood: x / Protein: x / Nitrite: x   Leuk Esterase: x / RBC: x / WBC x   Sq Epi: x / Non Sq Epi: x / Bacteria: x      CAPILLARY BLOOD GLUCOSE          Microbiology:      Radiology & additional studies:           [facility] Daily Resident Progress Note    Reason for admission: Acute on chronic sigmoid diverticulitis with perisigmoid and rectosigmoid air/fluid collections 07/09/23    SUBJECTIVE: Pt seen and examined at bedside. Denies chest pain, no SOB, no N/V, passing flatus, no BM, no overall pain.     OBJECTIVE:  Vital Signs Last 24 Hrs  T(C): 36.6 (13 Jul 2023 04:14), Max: 37.3 (12 Jul 2023 10:21)  T(F): 97.9 (13 Jul 2023 04:14), Max: 99.1 (12 Jul 2023 10:21)  HR: 85 (13 Jul 2023 04:14) (75 - 100)  BP: 129/68 (13 Jul 2023 04:14) (121/77 - 147/85)  BP(mean): --  RR: 18 (13 Jul 2023 04:14) (18 - 18)  SpO2: 93% (13 Jul 2023 04:14) (93% - 96%)    Parameters below as of 13 Jul 2023 04:14  Patient On (Oxygen Delivery Method): room air        I&O's Summary    11 Jul 2023 07:01  -  12 Jul 2023 07:00  --------------------------------------------------------  IN: 250 mL / OUT: 1600 mL / NET: -1350 mL    12 Jul 2023 07:01  -  13 Jul 2023 06:47  --------------------------------------------------------  IN: 590 mL / OUT: 600 mL / NET: -10 mL        Allergies:  No Known Allergies      Medications:  MEDICATIONS  (STANDING):  amLODIPine   Tablet 5 milliGRAM(s) Oral every 24 hours  aspirin  chewable 81 milliGRAM(s) Oral daily  atorvastatin 40 milliGRAM(s) Oral at bedtime  chlorhexidine 4% Liquid 1 Application(s) Topical <User Schedule>  donepezil 5 milliGRAM(s) Oral at bedtime  enoxaparin Injectable 40 milliGRAM(s) SubCutaneous every 24 hours  ertapenem  IVPB      ertapenem  IVPB 1000 milliGRAM(s) IV Intermittent every 24 hours  pantoprazole    Tablet 40 milliGRAM(s) Oral every 12 hours  sodium chloride 0.9%. 1000 milliLiter(s) (50 mL/Hr) IV Continuous <Continuous>    MEDICATIONS  (PRN):  sodium chloride 0.9% lock flush 10 milliLiter(s) IV Push every 1 hour PRN Pre/post blood products, medications, blood draw, and to maintain line patency      Physical Exam:  General Appearance: Appears well, NAD, A&Ox2.   Chest: Equal expansion bilaterally, no increased WOB.   CV: RRR, WWP.   Abdomen: soft, minimally tender, nondistended, no rigidity, no guarding.  Extremities: No swelling, asymmetry, or gross deformity appreciated.     Labs:                        8.1    6.54  )-----------( 354      ( 12 Jul 2023 09:20 )             27.5     07-12    143  |  108  |  12  ----------------------------<  87  4.2   |  23  |  1.24    Ca    9.3      12 Jul 2023 09:20  Phos  3.7     07-12  Mg     2.1     07-12        Urinalysis Basic - ( 12 Jul 2023 09:20 )    Color: x / Appearance: x / SG: x / pH: x  Gluc: 87 mg/dL / Ketone: x  / Bili: x / Urobili: x   Blood: x / Protein: x / Nitrite: x   Leuk Esterase: x / RBC: x / WBC x   Sq Epi: x / Non Sq Epi: x / Bacteria: x      CAPILLARY BLOOD GLUCOSE          Microbiology:      Radiology & additional studies:           [facility] Daily Resident Progress Note    Reason for admission: Acute on chronic sigmoid diverticulitis with perisigmoid and rectosigmoid air/fluid collections 07/09/23    SUBJECTIVE: Pt seen and examined at bedside. Denies chest pain, no SOB, no N/V, passing flatus, no BM, no overall pain.     OBJECTIVE:  Vital Signs Last 24 Hrs  T(C): 36.6 (13 Jul 2023 04:14), Max: 37.3 (12 Jul 2023 10:21)  T(F): 97.9 (13 Jul 2023 04:14), Max: 99.1 (12 Jul 2023 10:21)  HR: 85 (13 Jul 2023 04:14) (75 - 100)  BP: 129/68 (13 Jul 2023 04:14) (121/77 - 147/85)  BP(mean): --  RR: 18 (13 Jul 2023 04:14) (18 - 18)  SpO2: 93% (13 Jul 2023 04:14) (93% - 96%)    Parameters below as of 13 Jul 2023 04:14  Patient On (Oxygen Delivery Method): room air        I&O's Summary    11 Jul 2023 07:01  -  12 Jul 2023 07:00  --------------------------------------------------------  IN: 250 mL / OUT: 1600 mL / NET: -1350 mL    12 Jul 2023 07:01  -  13 Jul 2023 06:47  --------------------------------------------------------  IN: 590 mL / OUT: 600 mL / NET: -10 mL        Allergies:  No Known Allergies      Medications:  MEDICATIONS  (STANDING):  amLODIPine   Tablet 5 milliGRAM(s) Oral every 24 hours  aspirin  chewable 81 milliGRAM(s) Oral daily  atorvastatin 40 milliGRAM(s) Oral at bedtime  chlorhexidine 4% Liquid 1 Application(s) Topical <User Schedule>  donepezil 5 milliGRAM(s) Oral at bedtime  enoxaparin Injectable 40 milliGRAM(s) SubCutaneous every 24 hours  ertapenem  IVPB      ertapenem  IVPB 1000 milliGRAM(s) IV Intermittent every 24 hours  pantoprazole    Tablet 40 milliGRAM(s) Oral every 12 hours  sodium chloride 0.9%. 1000 milliLiter(s) (50 mL/Hr) IV Continuous <Continuous>    MEDICATIONS  (PRN):  sodium chloride 0.9% lock flush 10 milliLiter(s) IV Push every 1 hour PRN Pre/post blood products, medications, blood draw, and to maintain line patency      Physical Exam:  General Appearance: Appears well, NAD, A&Ox2.   Chest: Equal expansion bilaterally, no increased WOB.   CV: RRR, WWP.   Abdomen: soft, minimally tender, nondistended, no rigidity, no guarding.  Extremities: No swelling, asymmetry, or gross deformity appreciated.     Labs:                        8.1    6.54  )-----------( 354      ( 12 Jul 2023 09:20 )             27.5     07-12    143  |  108  |  12  ----------------------------<  87  4.2   |  23  |  1.24    Ca    9.3      12 Jul 2023 09:20  Phos  3.7     07-12  Mg     2.1     07-12        Urinalysis Basic - ( 12 Jul 2023 09:20 )    Color: x / Appearance: x / SG: x / pH: x  Gluc: 87 mg/dL / Ketone: x  / Bili: x / Urobili: x   Blood: x / Protein: x / Nitrite: x   Leuk Esterase: x / RBC: x / WBC x   Sq Epi: x / Non Sq Epi: x / Bacteria: x      CAPILLARY BLOOD GLUCOSE          Microbiology:      Radiology & additional studies:           [facility] Daily Resident Progress Note    Reason for admission: Acute on chronic sigmoid diverticulitis with perisigmoid and rectosigmoid air/fluid collections 07/09/23    SUBJECTIVE: Pt seen and examined at bedside. Patient feeling well. Admits to flatus and BM. Tolerating diet. Denies pain, nausea, vomiting.     OBJECTIVE:  Vital Signs Last 24 Hrs  T(C): 36.6 (13 Jul 2023 04:14), Max: 37.3 (12 Jul 2023 10:21)  T(F): 97.9 (13 Jul 2023 04:14), Max: 99.1 (12 Jul 2023 10:21)  HR: 85 (13 Jul 2023 04:14) (75 - 100)  BP: 129/68 (13 Jul 2023 04:14) (121/77 - 147/85)  BP(mean): --  RR: 18 (13 Jul 2023 04:14) (18 - 18)  SpO2: 93% (13 Jul 2023 04:14) (93% - 96%)    Parameters below as of 13 Jul 2023 04:14  Patient On (Oxygen Delivery Method): room air      I&O's Summary    11 Jul 2023 07:01  -  12 Jul 2023 07:00  --------------------------------------------------------  IN: 250 mL / OUT: 1600 mL / NET: -1350 mL    12 Jul 2023 07:01  -  13 Jul 2023 06:47  --------------------------------------------------------  IN: 590 mL / OUT: 600 mL / NET: -10 mL      Allergies:  No Known Allergies      Medications:  MEDICATIONS  (STANDING):  amLODIPine   Tablet 5 milliGRAM(s) Oral every 24 hours  aspirin  chewable 81 milliGRAM(s) Oral daily  atorvastatin 40 milliGRAM(s) Oral at bedtime  chlorhexidine 4% Liquid 1 Application(s) Topical <User Schedule>  donepezil 5 milliGRAM(s) Oral at bedtime  enoxaparin Injectable 40 milliGRAM(s) SubCutaneous every 24 hours  ertapenem  IVPB      ertapenem  IVPB 1000 milliGRAM(s) IV Intermittent every 24 hours  pantoprazole    Tablet 40 milliGRAM(s) Oral every 12 hours  sodium chloride 0.9%. 1000 milliLiter(s) (50 mL/Hr) IV Continuous <Continuous>    MEDICATIONS  (PRN):  sodium chloride 0.9% lock flush 10 milliLiter(s) IV Push every 1 hour PRN Pre/post blood products, medications, blood draw, and to maintain line patency      Physical Exam:  General Appearance: Appears well, NAD, A&Ox2.   Chest: Equal expansion bilaterally, no increased WOB.   CV: RRR, WWP.   Abdomen: soft, minimally tender, nondistended, no rigidity, no guarding.  Extremities: No swelling, asymmetry, or gross deformity appreciated.     Labs:                        8.1    6.54  )-----------( 354      ( 12 Jul 2023 09:20 )             27.5     07-12    143  |  108  |  12  ----------------------------<  87  4.2   |  23  |  1.24    Ca    9.3      12 Jul 2023 09:20  Phos  3.7     07-12  Mg     2.1     07-12    Urinalysis Basic - ( 12 Jul 2023 09:20 )  Color: x / Appearance: x / SG: x / pH: x  Gluc: 87 mg/dL / Ketone: x  / Bili: x / Urobili: x   Blood: x / Protein: x / Nitrite: x   Leuk Esterase: x / RBC: x / WBC x   Sq Epi: x / Non Sq Epi: x / Bacteria: x         [facility] Daily Resident Progress Note    Reason for admission: Acute on chronic sigmoid diverticulitis with perisigmoid and rectosigmoid air/fluid collections 07/09/23    SUBJECTIVE: Pt seen and examined at bedside. Patient feeling well. Admits to flatus and BM. Tolerating diet. Denies pain, nausea, vomiting.     OBJECTIVE:  Vital Signs Last 24 Hrs  T(C): 36.6 (13 Jul 2023 04:14), Max: 37.3 (12 Jul 2023 10:21)  T(F): 97.9 (13 Jul 2023 04:14), Max: 99.1 (12 Jul 2023 10:21)  HR: 85 (13 Jul 2023 04:14) (75 - 100)  BP: 129/68 (13 Jul 2023 04:14) (121/77 - 147/85)  BP(mean): --  RR: 18 (13 Jul 2023 04:14) (18 - 18)  SpO2: 93% (13 Jul 2023 04:14) (93% - 96%)    Parameters below as of 13 Jul 2023 04:14  Patient On (Oxygen Delivery Method): room air      I&O's Summary    11 Jul 2023 07:01  -  12 Jul 2023 07:00  --------------------------------------------------------  IN: 250 mL / OUT: 1600 mL / NET: -1350 mL    12 Jul 2023 07:01  -  13 Jul 2023 06:47  --------------------------------------------------------  IN: 590 mL / OUT: 600 mL / NET: -10 mL      Allergies:  No Known Allergies      Medications:  MEDICATIONS  (STANDING):  amLODIPine   Tablet 5 milliGRAM(s) Oral every 24 hours  aspirin  chewable 81 milliGRAM(s) Oral daily  atorvastatin 40 milliGRAM(s) Oral at bedtime  chlorhexidine 4% Liquid 1 Application(s) Topical <User Schedule>  donepezil 5 milliGRAM(s) Oral at bedtime  enoxaparin Injectable 40 milliGRAM(s) SubCutaneous every 24 hours  ertapenem  IVPB      ertapenem  IVPB 1000 milliGRAM(s) IV Intermittent every 24 hours  pantoprazole    Tablet 40 milliGRAM(s) Oral every 12 hours  sodium chloride 0.9%. 1000 milliLiter(s) (50 mL/Hr) IV Continuous <Continuous>    MEDICATIONS  (PRN):  sodium chloride 0.9% lock flush 10 milliLiter(s) IV Push every 1 hour PRN Pre/post blood products, medications, blood draw, and to maintain line patency      Physical Exam:  General Appearance: Appears well, NAD, A&Ox2.   Chest: Equal expansion bilaterally, no increased WOB.   CV: RRR, WWP.   Abdomen: soft, minimally tender, nondistended, no rigidity, no guarding.  Extremities: No swelling, asymmetry, or gross deformity appreciated.     Labs:                        8.1    6.54  )-----------( 354      ( 12 Jul 2023 09:20 )             27.5     07-12    143  |  108  |  12  ----------------------------<  87  4.2   |  23  |  1.24    Ca    9.3      12 Jul 2023 09:20  Phos  3.7     07-12  Mg     2.1     07-12    Urinalysis Basic - ( 12 Jul 2023 09:20 )  Color: x / Appearance: x / SG: x / pH: x  Gluc: 87 mg/dL / Ketone: x  / Bili: x / Urobili: x   Blood: x / Protein: x / Nitrite: x   Leuk Esterase: x / RBC: x / WBC x   Sq Epi: x / Non Sq Epi: x / Bacteria: x         [facility] Daily Resident Progress Note    Reason for admission: Acute on chronic sigmoid diverticulitis with perisigmoid and rectosigmoid air/fluid collections 07/09/23    SUBJECTIVE: Pt seen and examined at bedside. Patient feeling well. Admits to flatus and BM. Tolerating diet. Denies pain, nausea, vomiting.     OBJECTIVE:  Vital Signs Last 24 Hrs  T(C): 36.6 (13 Jul 2023 04:14), Max: 37.3 (12 Jul 2023 10:21)  T(F): 97.9 (13 Jul 2023 04:14), Max: 99.1 (12 Jul 2023 10:21)  HR: 85 (13 Jul 2023 04:14) (75 - 100)  BP: 129/68 (13 Jul 2023 04:14) (121/77 - 147/85)  BP(mean): --  RR: 18 (13 Jul 2023 04:14) (18 - 18)  SpO2: 93% (13 Jul 2023 04:14) (93% - 96%)    Parameters below as of 13 Jul 2023 04:14  Patient On (Oxygen Delivery Method): room air      I&O's Summary    11 Jul 2023 07:01  -  12 Jul 2023 07:00  --------------------------------------------------------  IN: 250 mL / OUT: 1600 mL / NET: -1350 mL    12 Jul 2023 07:01  -  13 Jul 2023 06:47  --------------------------------------------------------  IN: 590 mL / OUT: 600 mL / NET: -10 mL      Allergies:  No Known Allergies      Medications:  MEDICATIONS  (STANDING):  amLODIPine   Tablet 5 milliGRAM(s) Oral every 24 hours  aspirin  chewable 81 milliGRAM(s) Oral daily  atorvastatin 40 milliGRAM(s) Oral at bedtime  chlorhexidine 4% Liquid 1 Application(s) Topical <User Schedule>  donepezil 5 milliGRAM(s) Oral at bedtime  enoxaparin Injectable 40 milliGRAM(s) SubCutaneous every 24 hours  ertapenem  IVPB      ertapenem  IVPB 1000 milliGRAM(s) IV Intermittent every 24 hours  pantoprazole    Tablet 40 milliGRAM(s) Oral every 12 hours  sodium chloride 0.9%. 1000 milliLiter(s) (50 mL/Hr) IV Continuous <Continuous>    MEDICATIONS  (PRN):  sodium chloride 0.9% lock flush 10 milliLiter(s) IV Push every 1 hour PRN Pre/post blood products, medications, blood draw, and to maintain line patency      Physical Exam:  General Appearance: Appears well, NAD, A&Ox2.   Chest: Equal expansion bilaterally, no increased WOB.   CV: RRR, WWP.   Abdomen: soft, minimally tender, nondistended, no rigidity, no guarding.  Extremities: No swelling, asymmetry, or gross deformity appreciated.     Labs:                        8.1    6.54  )-----------( 354      ( 12 Jul 2023 09:20 )             27.5     07-12    143  |  108  |  12  ----------------------------<  87  4.2   |  23  |  1.24    Ca    9.3      12 Jul 2023 09:20  Phos  3.7     07-12  Mg     2.1     07-12    Urinalysis Basic - ( 12 Jul 2023 09:20 )  Color: x / Appearance: x / SG: x / pH: x  Gluc: 87 mg/dL / Ketone: x  / Bili: x / Urobili: x   Blood: x / Protein: x / Nitrite: x   Leuk Esterase: x / RBC: x / WBC x   Sq Epi: x / Non Sq Epi: x / Bacteria: x

## 2023-07-13 NOTE — PROGRESS NOTE ADULT - ASSESSMENT
83F with hx of TIA on ASA, HTN, HLD, vascular dementia and ventral hernia. Limited HPI information as patient has dementia. CT scan in the ED 07/09 concerning for acute on chronic diverticulitis with two small mahsa-sigmoid collections concerning for perforation. Collections 3.7x1.2cm and 2.7x1.2cm. PICC line placed 7/11. Patient pending rehab for discharge. No overnight events.    Plan:   - OOB with assistance  - Diet: Low fiber diet  - Enoxaparin (Lovenox) 40mg SC QD  - Ertapenem 1g QD  - PPI: Pantoprazole 40mg BID  - Continue Home meds  - D/c to [facility]  [id number] with hx of TIA on ASA, HTN, HLD, vascular dementia and ventral hernia. Limited HPI information as patient has dementia. CT scan in the ED 07/09 concerning for acute on chronic diverticulitis with two small mahsa-sigmoid collections concerning for perforation. Collections 3.7x1.2cm and 2.7x1.2cm. PICC line placed 7/11. Patient pending rehab for discharge. No overnight events.    Plan:   - OOB with assistance  - Diet: Low fiber diet  - Enoxaparin (Lovenox) 40mg SC QD  - Ertapenem 1g QD  - PPI: Pantoprazole 40mg BID  - Continue Home meds  - D/c to [facility]  [id number] with hx of TIA on ASA, HTN, HLD, vascular dementia and ventral hernia. Limited HPI information as patient has dementia. CT scan in the ED 07/09 concerning for acute on chronic diverticulitis with two small mahsa-sigmoid collections concerning for perforation. Collections 3.7x1.2cm and 2.7x1.2cm. PICC line placed 7/11. Patient pending rehab for discharge. No overnight events.    Plan:   - OOB with assistance  - Diet: Low fiber diet  - Enoxaparin (Lovenox) 40mg SC QD  - Ertapenem 1g QD  - PPI: Pantoprazole 40mg BID  - Continue Home meds  - D/c to [facility]  [id number] with hx of TIA on ASA, HTN, HLD, vascular dementia and ventral hernia. Limited HPI information as patient has dementia. CT scan in the ED 07/09 concerning for acute on chronic diverticulitis with two small mahsa-sigmoid collections concerning for perforation. Collections 3.7x1.2cm and 2.7x1.2cm. PICC line placed 7/11.    Plan:   - OOB with assistance  - Diet: Low fiber diet  - Enoxaparin (Lovenox) 40mg SC QD  - Ertapenem 1g QD  - PPI: Pantoprazole 40mg BID  - Continue Home meds  - D/c to [facility]    [facility]  [id number] with hx of TIA on ASA, HTN, HLD, vascular dementia and ventral hernia. Limited HPI information as patient has dementia. CT scan in the ED 07/09 concerning for acute on chronic diverticulitis with two small mahsa-sigmoid collections concerning for perforation. Collections 3.7x1.2cm and 2.7x1.2cm. PICC line placed 7/11.    Plan:   - OOB with assistance  - Diet: Low fiber diet  - Enoxaparin (Lovenox) 40mg SC QD  - Ertapenem 1g QD  - PPI: Pantoprazole 40mg BID  - Continue Home meds  - D/c to [facility]    [facility]  [id number] with hx of TIA on ASA, HTN, HLD, vascular dementia and ventral hernia. Limited HPI information as patient has dementia. CT scan in the ED 07/09 concerning for acute on chronic diverticulitis with two small mahsa-sigmoid collections concerning for perforation. Collections 3.7x1.2cm and 2.7x1.2cm. PICC line placed 7/11.    Plan:   - OOB with assistance  - Diet: Low fiber diet  - Enoxaparin (Lovenox) 40mg SC QD  - Ertapenem 1g QD  - PPI: Pantoprazole 40mg BID  - Continue Home meds  - D/c to [facility]    [facility]  [id number]

## 2023-07-13 NOTE — DISCHARGE NOTE NURSING/CASE MANAGEMENT/SOCIAL WORK - NSDCPEFALRISK_GEN_ALL_CORE
For information on Fall & Injury Prevention, visit: [email or web address] OR  [email or web address] OR  https://www.cdc.gov/steadi/patient.html For information on Fall & Injury Prevention, visit: [email or web address] OR  [email or web address] OR  https://www.cdc.gov/steadi/patient.html For information on Fall & Injury Prevention, visit: [email or web address] OR  [email or web address] OR  https://www.cdc.gov/steadi/patient.html

## 2023-07-14 DIAGNOSIS — K57.32 DIVERTICULITIS OF LARGE INTESTINE W/OUT PERFORATION OR ABSCESS W/OUT BLEEDING: ICD-10-CM

## 2023-07-14 LAB
CULTURE RESULTS: SIGNIFICANT CHANGE UP
SPECIMEN SOURCE: SIGNIFICANT CHANGE UP

## 2023-07-18 PROBLEM — Z00.00 ENCOUNTER FOR PREVENTIVE HEALTH EXAMINATION: Status: ACTIVE | Noted: 2023-07-18

## 2023-07-21 ENCOUNTER — APPOINTMENT (OUTPATIENT)
Dept: CT IMAGING | Facility: HOSPITAL | Age: 83
End: 2023-07-21

## 2023-11-28 NOTE — ED ADULT NURSE NOTE - TEMPLATE LIST FOR HEAD TO TOE ASSESSMENT
Patient Education   Personalized Prevention Plan  You are due for the preventive services outlined below.  Your care team is available to assist you in scheduling these services.  If you have already completed any of these items, please share that information with your care team to update in your medical record.  Health Maintenance Due   Topic Date Due     RSV VACCINE (Pregnancy & 60+) (1 - 1-dose 60+ series) Never done     Pneumococcal Vaccine (1 - PCV) Never done     COVID-19 Vaccine (3 - 2023-24 season) 09/01/2023     Annual Wellness Visit  11/07/2023        
General

## 2024-01-08 RX ORDER — ASPIRIN/CALCIUM CARB/MAGNESIUM 324 MG
1 TABLET ORAL
Qty: 0 | Refills: 0 | DISCHARGE

## 2024-01-08 RX ORDER — AMLODIPINE BESYLATE 2.5 MG/1
1 TABLET ORAL
Qty: 0 | Refills: 0 | DISCHARGE

## 2024-01-08 RX ORDER — DONEPEZIL HYDROCHLORIDE 10 MG/1
1 TABLET, FILM COATED ORAL
Qty: 0 | Refills: 0 | DISCHARGE

## 2024-01-08 RX ORDER — CIPROFLOXACIN LACTATE 400MG/40ML
1 VIAL (ML) INTRAVENOUS
Refills: 0 | DISCHARGE

## 2024-01-08 RX ORDER — ANASTROZOLE 1 MG/1
1 TABLET ORAL
Qty: 0 | Refills: 0 | DISCHARGE

## 2024-01-08 RX ORDER — ATORVASTATIN CALCIUM 80 MG/1
1 TABLET, FILM COATED ORAL
Qty: 0 | Refills: 0 | DISCHARGE

## 2024-05-18 NOTE — DISCHARGE NOTE PROVIDER - NSDCCPGOAL_GEN_ALL_CORE_FT
To get better and follow your care plan as instructed. Patient/Caregiver provided printed discharge information.

## 2024-11-27 NOTE — H&P ADULT - NSICDXPASTMEDICALHX_GEN_ALL_CORE_FT
Patient present today with mom and sister for flu vaccine. Administered per PCP.   
PAST MEDICAL HISTORY:  Breast CA S/P surgery and on hormone therapy    HTN (hypertension)

## 2025-02-19 NOTE — PHYSICAL THERAPY INITIAL EVALUATION ADULT - IMPAIRED TRANSFERS: SIT/STAND, REHAB EVAL
Office Visit Note  Date: 2/19/2025  Surgeon:  Vivek Almeida MD PhD  Office Location: [address] [address]  Dept: [phone]  Dept Fax: [phone]  Referring Provider: Renee Jacques MD  [address] [place] [address]  [address], [address] [place] [address]    Elmer Edgar is a 84 y.o. female who presents for the following: MOHS Surgery    According to the patient, the lesion has been present for approximately 6 months at the time of diagnosis.  The lesion is painful.  The lesion has not been treated previously.    The patient does not have a pacemaker / defibrillator.  The patient does not have a heart valve / joint replacement.    The patient is on blood thinners.  The patient does not have a history of hepatitis B or C.  The patient does not have a history of HIV.  The patient does not have a history of immunosuppression (e.g. organ transplantation, malignancy, medications)    Review of Systems:  No other skin or systemic complaints other than what is documented elsewhere in the note.    MEDICAL HISTORY: clinically relevant history including significant past medical history, medications and allergies was reviewed and documented in Epic.    Objective   Well appearing patient in no apparent distress; mood and affect are within normal limits.  Vital signs: See record.  Noted on the Scalp  Is a 2.5 x 1.5 cm scar                      The patient confirmed the identified site.    Discussion:  The nature of the diagnosis was explained. The lesion is a skin cancer.  It has a risk of local growth and distant spread. The condition is associated with sun exposure.  Warning signs of non-melanoma skin cancer discussed. Patient was instructed to perform monthly self skin examination.  We recommended that the patient have regular full skin exams given an increased risk of subsequent skin cancers. The patient was instructed to use sun protective behaviors  including use of broad spectrum sunscreens and sun protective clothing to reduce risk of skin cancers.      Risks, benefits, side effects of Mohs surgery were discussed with patient and the patient voiced understanding.  It was explained that even though the cure rate of Mohs is very high it is not 100%. Risks of surgery including but not limited to bleeding, infection, numbness, nerve damage, and scar were reviewed.  Discussion included wound care requirements, activity restrictions, likely scar outcome and time to heal.     After Mohs surgery, the defect may need to be repaired surgically and the scar may be longer than the original lesion.  Reconstruction options, risks, and benefits were reviewed including second intention healing, linear repair (4-1 ratio was explained), local flaps, skin grafts, cartilage grafts and interpolation flaps (the need for multiple surgeries was explained). Possible outcomes were reviewed including likely scar appearance, failure of flap survival, infection, bleeding and the need for revision surgery.     The pathology was reviewed, the photograph was reviewed, and the referring physician's note was reviewed.    Patient elected for Mohs surgery.         impaired balance/cognition/decreased strength

## 2025-03-13 NOTE — PATIENT PROFILE ADULT - NSASFUNCLEVELADLTOILET_GEN_A_NUR
[No Acute Distress] : no acute distress [No Respiratory Distress] : no respiratory distress  [Normal Rate] : normal rate  [No Edema] : there was no peripheral edema [Normal Affect] : the affect was normal [Normal Insight/Judgement] : insight and judgment were intact 3 = assistive equipment and person Bi-Rhombic Flap Text: The defect edges were debeveled with a #15 scalpel blade.  Given the location of the defect and the proximity to free margins a bi-rhombic flap was deemed most appropriate.  Using a sterile surgical marker, an appropriate rhombic flap was drawn incorporating the defect. The area thus outlined was incised deep to adipose tissue with a #15 scalpel blade.  The skin margins were undermined to an appropriate distance in all directions utilizing iris scissors.

## 2025-03-26 NOTE — PRE-OP CHECKLIST - HAIR REMOVAL
[Vitamins/Supplements] : vitamins/supplements [Nutrition/ Exercise/ Weight Management] : nutrition, exercise, weight management [Breast Self Exam] : breast self exam [Bladder Hygiene] : bladder hygiene [Preconception Care/ Fertility options] : preconception care, fertility options [Lab Results] : lab results [Medication Management] : medication management hair removal not indicated